# Patient Record
Sex: FEMALE | Race: WHITE | NOT HISPANIC OR LATINO | Employment: FULL TIME | URBAN - NONMETROPOLITAN AREA
[De-identification: names, ages, dates, MRNs, and addresses within clinical notes are randomized per-mention and may not be internally consistent; named-entity substitution may affect disease eponyms.]

---

## 2017-04-07 ENCOUNTER — HOSPITAL ENCOUNTER (OUTPATIENT)
Dept: RADIOLOGY | Facility: OTHER | Age: 52
End: 2017-04-07
Attending: FAMILY MEDICINE

## 2017-04-07 ENCOUNTER — HISTORY (OUTPATIENT)
Dept: RADIOLOGY | Facility: OTHER | Age: 52
End: 2017-04-07

## 2017-04-07 DIAGNOSIS — Z12.31 ENCOUNTER FOR SCREENING MAMMOGRAM FOR MALIGNANT NEOPLASM OF BREAST: ICD-10-CM

## 2017-04-11 ENCOUNTER — AMBULATORY - GICH (OUTPATIENT)
Dept: FAMILY MEDICINE | Facility: OTHER | Age: 52
End: 2017-04-11

## 2017-04-11 ENCOUNTER — OFFICE VISIT - GICH (OUTPATIENT)
Dept: FAMILY MEDICINE | Facility: OTHER | Age: 52
End: 2017-04-11

## 2017-04-11 ENCOUNTER — HISTORY (OUTPATIENT)
Dept: FAMILY MEDICINE | Facility: OTHER | Age: 52
End: 2017-04-11

## 2017-04-11 DIAGNOSIS — Z00.00 ENCOUNTER FOR GENERAL ADULT MEDICAL EXAMINATION WITHOUT ABNORMAL FINDINGS: ICD-10-CM

## 2017-04-11 DIAGNOSIS — Z12.4 ENCOUNTER FOR SCREENING FOR MALIGNANT NEOPLASM OF CERVIX: ICD-10-CM

## 2017-04-11 DIAGNOSIS — E78.49 OTHER HYPERLIPIDEMIA: ICD-10-CM

## 2017-04-11 DIAGNOSIS — D12.6 BENIGN NEOPLASM OF COLON: ICD-10-CM

## 2017-04-11 DIAGNOSIS — D64.9 ANEMIA: ICD-10-CM

## 2017-04-11 DIAGNOSIS — M23.203 DERANGEMENT OF MEDIAL MENISCUS OF RIGHT KNEE DUE TO OLD INJURY: ICD-10-CM

## 2017-04-14 ENCOUNTER — AMBULATORY - GICH (OUTPATIENT)
Dept: FAMILY MEDICINE | Facility: OTHER | Age: 52
End: 2017-04-14

## 2017-04-14 ENCOUNTER — AMBULATORY - GICH (OUTPATIENT)
Dept: LAB | Facility: OTHER | Age: 52
End: 2017-04-14

## 2017-04-14 DIAGNOSIS — D12.6 BENIGN NEOPLASM OF COLON: ICD-10-CM

## 2017-04-14 DIAGNOSIS — D64.9 ANEMIA: ICD-10-CM

## 2017-04-14 DIAGNOSIS — E78.49 OTHER HYPERLIPIDEMIA: ICD-10-CM

## 2017-04-14 DIAGNOSIS — Z00.00 ENCOUNTER FOR GENERAL ADULT MEDICAL EXAMINATION WITHOUT ABNORMAL FINDINGS: ICD-10-CM

## 2017-04-14 LAB
A/G RATIO - HISTORICAL: 1.3 (ref 1–2)
ABSOLUTE BASOPHILS - HISTORICAL: 0.1 THOU/CU MM
ABSOLUTE EOSINOPHILS - HISTORICAL: 0.3 THOU/CU MM
ABSOLUTE LYMPHOCYTES - HISTORICAL: 2 THOU/CU MM (ref 0.9–2.9)
ABSOLUTE MONOCYTES - HISTORICAL: 0.5 THOU/CU MM
ABSOLUTE NEUTROPHILS - HISTORICAL: 3.3 THOU/CU MM (ref 1.7–7)
ALBUMIN SERPL-MCNC: 3.9 G/DL (ref 3.5–5.7)
ALP SERPL-CCNC: 77 IU/L (ref 34–104)
ALT (SGPT) - HISTORICAL: 27 IU/L (ref 7–52)
ANION GAP - HISTORICAL: 8 (ref 5–18)
AST SERPL-CCNC: 20 IU/L (ref 13–39)
BASOPHILS # BLD AUTO: 1.5 %
BILIRUB SERPL-MCNC: 0.7 MG/DL (ref 0.3–1)
BILIRUB UR QL: NEGATIVE
BUN SERPL-MCNC: 13 MG/DL (ref 7–25)
BUN/CREAT RATIO - HISTORICAL: 17
CALCIUM SERPL-MCNC: 9.1 MG/DL (ref 8.6–10.3)
CHLORIDE SERPLBLD-SCNC: 105 MMOL/L (ref 98–107)
CHOL/HDL RATIO - HISTORICAL: 5.21
CHOLESTEROL TOTAL: 250 MG/DL
CLARITY, URINE: CLEAR CLARITY
CO2 SERPL-SCNC: 27 MMOL/L (ref 21–31)
COLOR UR: YELLOW COLOR
CREAT SERPL-MCNC: 0.76 MG/DL (ref 0.7–1.3)
EOSINOPHIL NFR BLD AUTO: 4.3 %
ERYTHROCYTE [DISTWIDTH] IN BLOOD BY AUTOMATED COUNT: 12.6 % (ref 11.5–15.5)
GFR IF NOT AFRICAN AMERICAN - HISTORICAL: >60 ML/MIN/1.73M2
GLOBULIN - HISTORICAL: 3.1 G/DL (ref 2–3.7)
GLUCOSE SERPL-MCNC: 90 MG/DL (ref 70–105)
GLUCOSE URINE: NEGATIVE MG/DL
HCT VFR BLD AUTO: 41.6 % (ref 33–51)
HDLC SERPL-MCNC: 48 MG/DL (ref 23–92)
HEMOGLOBIN: 13.7 G/DL (ref 12–16)
KETONES UR QL: NEGATIVE MG/DL
LDLC SERPL CALC-MCNC: 172 MG/DL
LEUKOCYTE ESTERASE URINE: NEGATIVE
LYMPHOCYTES NFR BLD AUTO: 32.2 % (ref 20–44)
MCH RBC QN AUTO: 29.3 PG (ref 26–34)
MCHC RBC AUTO-ENTMCNC: 32.8 G/DL (ref 32–36)
MCV RBC AUTO: 90 FL (ref 80–100)
MONOCYTES NFR BLD AUTO: 7.6 %
NEUTROPHILS NFR BLD AUTO: 54.5 % (ref 42–72)
NITRITE UR QL STRIP: NEGATIVE
NON-HDL CHOLESTEROL - HISTORICAL: 202 MG/DL
OCCULT BLOOD,URINE - HISTORICAL: NEGATIVE
PATIENT STATUS - HISTORICAL: ABNORMAL
PH UR: 7 [PH]
PLATELET # BLD AUTO: 277 THOU/CU MM (ref 140–440)
PMV BLD: 7.5 FL (ref 6.5–11)
POTASSIUM SERPL-SCNC: 4.3 MMOL/L (ref 3.5–5.1)
PROT SERPL-MCNC: 7 G/DL (ref 6.4–8.9)
PROTEIN QUALITATIVE,URINE - HISTORICAL: NEGATIVE MG/DL
RED BLOOD COUNT - HISTORICAL: 4.65 MIL/CU MM (ref 4–5.2)
SODIUM SERPL-SCNC: 140 MMOL/L (ref 133–143)
SP GR UR STRIP: 1.01
TRIGL SERPL-MCNC: 148 MG/DL
TSH - HISTORICAL: 2.67 UIU/ML (ref 0.34–5.6)
UROBILINOGEN,QUALITATIVE - HISTORICAL: NORMAL EU/DL
WHITE BLOOD COUNT - HISTORICAL: 6.1 THOU/CU MM (ref 4.5–11)

## 2017-09-18 ENCOUNTER — HISTORY (OUTPATIENT)
Dept: FAMILY MEDICINE | Facility: OTHER | Age: 52
End: 2017-09-18

## 2017-09-18 ENCOUNTER — OFFICE VISIT - GICH (OUTPATIENT)
Dept: FAMILY MEDICINE | Facility: OTHER | Age: 52
End: 2017-09-18

## 2017-09-18 DIAGNOSIS — J02.9 ACUTE PHARYNGITIS: ICD-10-CM

## 2017-09-18 LAB — STREP A ANTIGEN - HISTORICAL: NEGATIVE

## 2017-09-21 ENCOUNTER — OFFICE VISIT - GICH (OUTPATIENT)
Dept: FAMILY MEDICINE | Facility: OTHER | Age: 52
End: 2017-09-21

## 2017-09-21 ENCOUNTER — HISTORY (OUTPATIENT)
Dept: FAMILY MEDICINE | Facility: OTHER | Age: 52
End: 2017-09-21

## 2017-09-21 DIAGNOSIS — R06.83 SNORING: ICD-10-CM

## 2017-09-21 DIAGNOSIS — R40.0 SOMNOLENCE: ICD-10-CM

## 2017-09-21 DIAGNOSIS — F51.01 PRIMARY INSOMNIA: ICD-10-CM

## 2017-11-15 ENCOUNTER — HOSPITAL ENCOUNTER (OUTPATIENT)
Dept: SLEEP MEDICINE | Facility: OTHER | Age: 52
Setting detail: THERAPIES SERIES
End: 2017-11-15
Attending: FAMILY MEDICINE

## 2017-12-13 ENCOUNTER — HISTORY (OUTPATIENT)
Dept: FAMILY MEDICINE | Facility: OTHER | Age: 52
End: 2017-12-13

## 2017-12-13 ENCOUNTER — OFFICE VISIT - GICH (OUTPATIENT)
Dept: FAMILY MEDICINE | Facility: OTHER | Age: 52
End: 2017-12-13

## 2017-12-13 DIAGNOSIS — R20.2 PARESTHESIA OF SKIN: ICD-10-CM

## 2017-12-13 DIAGNOSIS — R20.0 ANESTHESIA OF SKIN: ICD-10-CM

## 2017-12-13 LAB
ABSOLUTE BASOPHILS - HISTORICAL: 0.1 THOU/CU MM
ABSOLUTE EOSINOPHILS - HISTORICAL: 0.3 THOU/CU MM
ABSOLUTE IMMATURE GRANULOCYTES(METAS,MYELOS,PROS) - HISTORICAL: 0 THOU/CU MM
ABSOLUTE LYMPHOCYTES - HISTORICAL: 2 THOU/CU MM (ref 0.9–2.9)
ABSOLUTE MONOCYTES - HISTORICAL: 0.7 THOU/CU MM
ABSOLUTE NEUTROPHILS - HISTORICAL: 4.6 THOU/CU MM (ref 1.7–7)
ANION GAP - HISTORICAL: 8 (ref 5–18)
BASOPHILS # BLD AUTO: 0.8 %
BUN SERPL-MCNC: 16 MG/DL (ref 7–25)
BUN/CREAT RATIO - HISTORICAL: 22
CALCIUM SERPL-MCNC: 8.7 MG/DL (ref 8.6–10.3)
CHLORIDE SERPLBLD-SCNC: 104 MMOL/L (ref 98–107)
CO2 SERPL-SCNC: 26 MMOL/L (ref 21–31)
CREAT SERPL-MCNC: 0.73 MG/DL (ref 0.7–1.3)
EOSINOPHIL NFR BLD AUTO: 3.3 %
ERYTHROCYTE [DISTWIDTH] IN BLOOD BY AUTOMATED COUNT: 12.9 % (ref 11.5–15.5)
GFR IF NOT AFRICAN AMERICAN - HISTORICAL: >60 ML/MIN/1.73M2
GLUCOSE SERPL-MCNC: 93 MG/DL (ref 70–105)
HCT VFR BLD AUTO: 39.8 % (ref 33–51)
HEMOGLOBIN: 13.3 G/DL (ref 12–16)
IMMATURE GRANULOCYTES(METAS,MYELOS,PROS) - HISTORICAL: 0.5 %
LYMPHOCYTES NFR BLD AUTO: 26.6 % (ref 20–44)
MCH RBC QN AUTO: 29.4 PG (ref 26–34)
MCHC RBC AUTO-ENTMCNC: 33.4 G/DL (ref 32–36)
MCV RBC AUTO: 88 FL (ref 80–100)
MONOCYTES NFR BLD AUTO: 9 %
NEUTROPHILS NFR BLD AUTO: 59.8 % (ref 42–72)
PLATELET # BLD AUTO: 256 THOU/CU MM (ref 140–440)
PMV BLD: 10.4 FL (ref 6.5–11)
POTASSIUM SERPL-SCNC: 3.6 MMOL/L (ref 3.5–5.1)
RED BLOOD COUNT - HISTORICAL: 4.52 MIL/CU MM (ref 4–5.2)
SODIUM SERPL-SCNC: 138 MMOL/L (ref 133–143)
TSH - HISTORICAL: 2.99 UIU/ML (ref 0.34–5.6)
VIT B12 SERPL-MCNC: 332 PG/ML (ref 180–914)
VITAMIN D TOTAL - HISTORICAL: 17.8 NG/ML
WHITE BLOOD COUNT - HISTORICAL: 7.6 THOU/CU MM (ref 4.5–11)

## 2017-12-14 ENCOUNTER — COMMUNICATION - GICH (OUTPATIENT)
Dept: FAMILY MEDICINE | Facility: OTHER | Age: 52
End: 2017-12-14

## 2017-12-14 DIAGNOSIS — E55.9 VITAMIN D DEFICIENCY: ICD-10-CM

## 2017-12-27 NOTE — PROGRESS NOTES
Patient Information     Patient Name MRN Sex Michelle Velasco [id number] Female 1965      Progress Notes by Radha Gamble NP at 2017 12:00 PM     Author:  Radha Gamble NP Service:  (none) Author Type:  PHYS- Nurse Practitioner     Filed:  2017  4:44 PM Encounter Date:  2017 Status:  Signed     :  Radha Gamble NP (PHYS- Nurse Practitioner)            Nursing Notes:   Glenda Costa  2017 12:24 PM  Signed  Patient presents to the clinic for sore throat that started yesterday. Patient reports fatigue that started yesterday also and having chills Saturday night. No temp was taken.    Glenda CARNEY CMA.......2017..12:01 PM    SUBJECTIVE:    Michelle Du is a 52 y.o. female who presents for Sore throat    Pharyngitis    This is a new problem. The current episode started yesterday. The problem has been unchanged. Neither side of throat is experiencing more pain than the other. There has been no fever. The pain is moderate. Pertinent negatives include no congestion, coughing, drooling, ear discharge, ear pain, headaches, hoarse voice, plugged ear sensation, neck pain, shortness of breath, stridor, swollen glands, trouble swallowing or vomiting. She has had no exposure to strep or mono. She has tried nothing for the symptoms. The treatment provided no relief.       Current Outpatient Prescriptions on File Prior to Visit       Medication  Sig Dispense Refill     omeprazole (PRILOSEC) 20 mg Delayed-Release capsule Take 2 capsules by mouth once daily if needed for GI Upset. 180 capsule 3     No current facility-administered medications on file prior to visit.        REVIEW OF SYSTEMS:  Review of Systems   HENT: Negative for congestion, drooling, ear discharge, ear pain, hoarse voice and trouble swallowing.    Respiratory: Negative for cough, shortness of breath and stridor.    Gastrointestinal: Negative for vomiting.   Musculoskeletal: Negative for neck pain.  "  Neurological: Negative for headaches.       OBJECTIVE:  /70  Pulse 90  Temp 98.1  F (36.7  C) (Tympanic)  Ht 1.575 m (5' 2\")  Wt 118.4 kg (261 lb 2 oz)  BMI 47.76 kg/m2    EXAM:   Physical Exam   Constitutional: She is well-developed, well-nourished, and in no distress.   HENT:   Head: Normocephalic and atraumatic.   Right Ear: Tympanic membrane and ear canal normal.   Left Ear: Tympanic membrane and ear canal normal.   Nose: Nose normal.   Mouth/Throat: Uvula is midline and mucous membranes are normal. Posterior oropharyngeal erythema present.   Eyes: Conjunctivae are normal.   Neck: Neck supple.   Cardiovascular: Normal rate, regular rhythm and normal heart sounds.    Pulmonary/Chest: Effort normal and breath sounds normal. No respiratory distress. She has no wheezes. She has no rales.   Lymphadenopathy:     She has no cervical adenopathy.   Neurological: She is alert.   Skin: Skin is warm and dry. No rash noted.   Nursing note and vitals reviewed.      Results for orders placed or performed in visit on 09/18/17      RAPID STREP WITH REFLEX CULTURE      Result  Value Ref Range    STREP A ANTIGEN           Negative Negative       ASSESSMENT/PLAN:    ICD-10-CM    1. Sore throat J02.9 RAPID STREP WITH REFLEX CULTURE      RAPID STREP WITH REFLEX CULTURE        Plan:  Completed labs at today's visit RST.  I personally reviewed the labs with the patient/parent at the visit. Abnormalities include None. Home cares and OTC gone over. Labs discussed. Rest, fluids, and over-the-counter symptomatic treatments were recommended. The lack of efficacy of antibiotics in viral illnesses was discussed. The patient will return to the clinic or call if the symptoms worsen, new problems develop, or if all symptoms are not significantly improved in 72 hours, and completely resolved within one week. Otherwise the patient will call with other questions, concerns, or problems.        SAMY ROMANO NP ....................  " 9/18/2017   4:43 PM

## 2017-12-28 NOTE — PATIENT INSTRUCTIONS
Patient Information     Patient Name MRN Michelle Burks [id number] Female 1965      Patient Instructions by Radha Gamble NP at 2017 12:00 PM     Author:  Radha Gamble NP Service:  (none) Author Type:  PHYS- Nurse Practitioner     Filed:  2017 12:41 PM Encounter Date:  2017 Status:  Signed     :  Radha Gamble NP (PHYS- Nurse Practitioner)            Cold Medicines   What are cold medicines?  Symptoms of the common cold start gradually over several days and usually last about two weeks. Symptoms may include sneezing, a stuffy or runny nose, sore throat, cough, watery eyes, mild headache, or body aches. A cold will go away on its own without treatment. However, there are many nonprescription products that may help relieve some of the symptoms of a cold. Cold medicines often contain more than one ingredient and are used to treat more than one symptom. Read the labels and buy products that have only the ingredients that you need. If you are not sure which medicine is best, ask your pharmacist.  How do they work?  Decongestants reduce swelling in your nose and sinuses. They may also lessen the amount of mucus made by your nose. If you use decongestants more often than directed, your stuffy nose may get worse.   Antihistamines block the effect of histamine. Histamine is a chemical your body makes when you have an allergic reaction. Antihistamines are most often used to treat itchy or watery eyes or a stuffy or runny nose caused by an allergy. Antihistamines may not help a stuffy or runny nose caused by a cold because they can make mucus thick and dry.  Mucolytics are medicines that make mucus thinner so that it is easier to cough up out of your throat and lungs.  Expectorants are cough medicines that may help to keep the mucus thin and bring up mucus from the lungs when you cough. This may relieve chest congestion and make it easier to breathe.   Cough suppressants  (antitussives) are medicines that lessen the urge to cough. They may give relief from a dry, hacking cough. If you have a cough that is wet sounding and produces mucus, it is important for you to cough the mucus up out of your lungs. For this reason, cough suppressants are not recommended for a wet sounding cough.  Fever and pain relievers, such as acetaminophen, aspirin, or other nonsteroidal anti-inflammatory drugs (NSAIDs), are often included in cold medicine. Read labels carefully to avoid taking more medicine than you need.  What else do I need to know about this medicine?    Talk to your healthcare provider if your symptoms start suddenly or you have severe symptoms. This may mean you have something more serious than a cold.    Follow the directions that come with your medicine, including information about food or alcohol. Make sure you know how and when to take your medicine. Do not take more or less than you are supposed to take.    Try to get all of your medicine at the same place. Your pharmacist can help make sure that all of your medicines are safe to take together.    Keep a list of your medicines with you. List all of the prescription medicines, nonprescription medicines, supplements, natural remedies, and vitamins that you take. Tell all healthcare providers who treat you about all of the products you are taking.    Many medicines have side effects. A side effect is a symptom or problem that is caused by the medicine. Ask your healthcare provider or pharmacist what side effects the medicine may cause and what you should do if you have side effects.    Nonsteroidal anti-inflammatory medicines (NSAIDs), such as ibuprofen, naproxen, and aspirin, may cause stomach bleeding and other problems. These risks increase with age. Read the label and take as directed. Unless recommended by your healthcare provider, do not take for more than 10 days for any reason.    Acetaminophen may cause liver damage or other  problems. Unless recommended by your provider, don't take more than 3000 milligrams (mg) in 24 hours. To make sure you don t take too much, check other medicines you take to see if they also contain acetaminophen. Ask your provider if you need to avoid drinking alcohol while taking this medicine.  If you have any questions, ask your healthcare provider or pharmacist for more information. Be sure to keep all appointments for provider visits or tests.

## 2017-12-28 NOTE — PROGRESS NOTES
Patient Information     Patient Name MRN Sex Michelle Velasco [id number] Female 1965      Progress Notes by Tad Ortiz MD at 2017  1:00 PM     Author:  Tad Ortiz MD Service:  (none) Author Type:  Physician     Filed:  2017  8:56 AM Encounter Date:  2017 Status:  Signed     :  Tad Ortiz MD (Physician)            SUBJECTIVE:  52 y.o. female who presents for evaluation of progressively severe daytime somnolence. She notes it is been coming on for quite some time but she's gotten more and more sleepy during the day to the point where she sometimes falls asleep when she takes a break at work. She states she does not sleep well at night. Her family has noted that she snores and apparently has been told that she will sometimes have positive breathing.    She had a physical earlier this year and had lab work all of which was normal. She states that she wakes up multiple times during the night.    Additional Review of Systems: see HPI:   No chest pain or shortness of breath and no neurologic symptoms.    Past Medical History:     Diagnosis  Date     Diverticulosis of colon, extensive 3/29/2013     Gastritis 3/29/2013     GERD      H/O adenomatous polyp of colon 3/29/2013     INCONTINENCE, URGE 2011     INSOMNIA, CHRONIC      Tubular adenoma of colon 2014-repeat colonoscopy in 2018         Current Outpatient Prescriptions       Medication  Sig Dispense Refill     omeprazole (PRILOSEC) 20 mg Delayed-Release capsule Take 2 capsules by mouth once daily if needed for GI Upset. 180 capsule 3     traZODone (DESYREL) 100 mg tablet Take 0.5-1 tablets by mouth at bedtime. 30 tablet 5     No current facility-administered medications for this visit.      Medications have been reviewed by me and are current to the best of my knowledge and ability.      Allergies as of 2017 - Nic as Reviewed 2017      Allergen  Reaction Noted      "Penicillins Rash 03/08/2013     Sulfa (sulfonamide antibiotics) Rash 03/08/2013        OBJECTIVE:  /78  Pulse 76  Ht 1.562 m (5' 1.5\")  Wt 117.6 kg (259 lb 3.2 oz)  LMP 11/25/2016  BMI 48.18 kg/m2  EXAM:  General Appearance: Pleasant, alert, appropriate appearance for age. No acute distress. Obese, with a BMI of 48.  OroPharynx Exam: Normal. Nose is normal.  However there does appear to be a fairly short distance between the uvula and the posterior pharyngeal wall.  Thyroid Exam: No nodules or enlargement.  Chest/Respiratory Exam: Normal chest wall and respirations. Clear to auscultation.  Cardiovascular Exam: Regular rate and rhythm. S1, S2, no murmur, click, gallop, or rubs.  No peripheral edema.  Neurologic Exam: Intact and nonfocal  Psychiatric Exam: Alert and oriented, appropriate affect.  Laboratory studies done earlier this summer were reviewed.    ASSESSMENT/PLAN:  Daytime somnolence and snoring-symptoms are quite consistent with sleep apnea. She also has insomnia which is part of the issue. A sleep study will be scheduled. In the meantime I suggested she try trazodone to see if this will help with her sleep. She will follow up again pending the results of her sleep study.  Tad Ortiz MD ....................  9/22/2017   8:56 AM            "

## 2017-12-30 NOTE — NURSING NOTE
Patient Information     Patient Name MRN Sex Michelle Velasco [id number] Female 1965      Nursing Note by Rea Palmer at 2017  1:00 PM     Author:  eRa Palmer Service:  (none) Author Type:  (none)     Filed:  2017  1:27 PM Encounter Date:  2017 Status:  Signed     :  Rea Palmer            Patient presents to clinic with fatigue. She is not sleeping well at night. This has been going on for a couple years. She is now starting to fall asleep at work during breaks.  Rea PalmerLPN ....................  2017   1:08 PM

## 2017-12-30 NOTE — NURSING NOTE
Patient Information     Patient Name MRN Sex Michelle Velasco [id number] Female 1965      Nursing Note by Glenda Costa at 2017 12:00 PM     Author:  Glenda Costa Service:  (none) Author Type:  (none)     Filed:  2017 12:24 PM Encounter Date:  2017 Status:  Signed     :  Glenda Costa            Patient presents to the clinic for sore throat that started yesterday. Patient reports fatigue that started yesterday also and having chills day night. No temp was taken.    Glenda CARNEY, FREDERICK.......2017..12:01 PM

## 2018-01-04 NOTE — NURSING NOTE
Patient Information     Patient Name MRN Sex Michelle Velasco [id number] Female 1965      Nursing Note by Leigh Connors at 2017  2:45 PM     Author:  Leigh Connors Service:  (none) Author Type:  (none)     Filed:  2017  3:05 PM Encounter Date:  2017 Status:  Signed     :  Leigh Connors            Patient presents to the clinic today for a px.    Leigh Connors ....................  2017   3:05 PM

## 2018-01-04 NOTE — PROGRESS NOTES
Patient Information     Patient Name MRN Sex     Michelle Galvan [id number] Female 1965      Progress Notes signed by Tarah Gallagher MD at 2017 11:41 AM      Author:  Traah Gallagher MD Service:  (none) Author Type:  Physician     Filed:  2017 11:41 AM Encounter Date:  2017 Status:  Signed     :  Tarah Gallagher MD (Physician)            -  2017          RE:  MICHELLE GALVAN  MR#:  [id number]  :  1965  LOCATION:    ROOM:      Dear  Michelle:    I released all your lab work to [facility] so you can take a look at it. The tests all looked entirely normal with the exception of the cholesterol, but it has come down from 259 when it was checked a couple of years ago. If you work on diet and exercise and lose some weight, this will likely continue to come down. Your blood profile was totally normal, with no evidence of diabetes or any problems with the liver or kidneys. Overall, these tests look just fine. I would suggest repeating labs again in a year.     Sincerely yours,         TARAH GALLAGHER MD    WR/cd  Voice Job ID: [id number]  Text Job ID:  [id number]    cc:  2017  RE:  MICHELLE GALVAN  MR#:  [id number]  Page 1

## 2018-01-04 NOTE — H&P
Patient Information     Patient Name MRN Michelle Burks [id number] Female 1965      H&P by Tad Ortiz MD at 2017  2:45 PM     Author:  Tad Ortiz MD Service:  (none) Author Type:  Physician     Filed:  2017  4:02 PM Encounter Date:  2017 Status:  Signed     :  Tad Ortiz MD (Physician)            HPI-Comes in today for comprehensive evaluation.  She's been feeling relatively well. She has been having some hot flashes, however, but they're not real severe. Her last visit. Was 2016. She's had no GYN symptoms or other problems.    She has a history of anemia and had a significant workup done in . At that time she was found to have gastritis on EGD, and significant diverticulosis along with a colon polyp which was a tubular adenoma. She is due for a colonoscopy again next year. She has noted that she felt a bit tired and rundown, and she also had 2 episodes of minimal blood in the stool last month. She's had no more since that she had none before. This happened in 2 consecutive days.    She is otherwise felt well. Her only medication is occasional omeprazole.    ROS:  See HPI.  In general she is gradually gaining some weight but no fever, chills, or night sweats. Eyes and ENT are negative. Cardiopulmonary, GI, , rheumatologic, hematologic, skin, lymph, neurologic, psychiatric and endocrine are all negative.    Past Medical History:     Diagnosis  Date     Diverticulosis of colon, extensive 3/29/2013     Gastritis 3/29/2013     GERD      H/O adenomatous polyp of colon 3/29/2013     INCONTINENCE, URGE 2011     INSOMNIA, CHRONIC      Tubular adenoma of colon 2014-repeat colonoscopy in 2018      Past Surgical History:      Procedure  Laterality Date      SECTION      x 2       COLONOSCOPY DIAGNOSTIC  3/29/13    Colonoscopy F/U 2018       ESOPHAGOGASTRODUODENOSCOPY  3/29 13    EGD       TUBAL LIGATION        Family History       Problem   Relation Age of Onset     Diabetes  Father      Hypertension  Father      Other  Father      metastatic melanoma       Heart Disease  Mother      acute MI       Cancer-breast  No Family History      Social History     Substance Use Topics       Smoking status: Never Smoker     Smokeless tobacco: Never Used     Alcohol use No     Allergies     Allergen  Reactions     Penicillins Rash     Sulfa (Sulfonamide Antibiotics) Rash       Current Outpatient Prescriptions:      omeprazole (PRILOSEC) 20 mg Delayed-Release capsule, Take 2 capsules by mouth once daily if needed for GI Upset., Disp: 180 capsule, Rfl: 3  Medications have been reviewed by me and are current to the best of my knowledge and ability.      EXAM:she is a pleasant healthy-appearing 51 y.o. female in no apparent distress.  She answers questions appropriately and appears well-kempt.  HEENT exam is within normal limits.  .  Neck is supple with good carotid pulses.  No bruits are heard.  No thyromegaly or adenopathy.  Lungs are clear with good air movement.  No rales, rhonchi, or wheezes are heard--  Breasts are normal in appearance, palpably free of masses.    There is no skin dimpling or nipple changes and axilla are negative.  Cardiac exam reveals a regular rhythm with no murmur or gallop appreciated.  Peripheral pulses are equal and strong  Abdomen-quite obese, but soft and nontender with no hepatosplenomegaly or masses.  Bowel sounds are normal and no bruits are heard  Back is straight with no scoliosis or CVA tenderness.     exam reveals normal external genitalia.  Vagina is normal. Cervix is normal in appearance. Pap smear was done..  Uterus is difficult to palpate because of her size..  Both adnexa are normal.     Extremities no edema. Good distal pulses.  Neurologic exam is intact and nonfocal  Skin is free of significant lesions.  No rashes are seen  Lymph nodes are nonpalpable in cervical, axillary, and inguinal  areas.  Psychiatric: Alert and oriented with normal mood and affect    Lab-pending  Imaging-mammogram done last week was normal. Report was reviewed with her.    Assessment-preventive healthcare-normal exam. Mammogram was done and was normal. Colonoscopy is due next year. Pap smear was done today.    History of tubular adenoma associated with anemia and rectal bleeding. If she is anemic again this year I suggested we go ahead with colonoscopy this year. We will await results of her lab work. She will come back fasting for that.    History of medial meniscal tear. She still does have some knee pain but she said it's very tolerable. She will see orthopedics if symptoms worsen.        Plan-she'll be notified of her Pap result. Reviewed her mammogram report with her. She will return fasting for lab and I'll notify her of the results. If hemoglobin is low proceed with colonoscopy. If so, clearance is given to proceed based on today's exam.    Tad Ortiz MD ....................  4/11/2017   3:57 PM

## 2018-01-04 NOTE — PROGRESS NOTES
Patient Information     Patient Name MRN Michelle Burks [id number] Female 1965      Progress Notes by Tad Ortiz MD at 2017  2:45 PM     Author:  Tad Ortiz MD Service:  (none) Author Type:  Physician     Filed:  2017  4:02 PM Encounter Date:  2017 Status:  Signed     :  Tad Ortiz MD (Physician)            HPI-Comes in today for comprehensive evaluation.  She's been feeling relatively well. She has been having some hot flashes, however, but they're not real severe. Her last visit. Was 2016. She's had no GYN symptoms or other problems.    She has a history of anemia and had a significant workup done in . At that time she was found to have gastritis on EGD, and significant diverticulosis along with a colon polyp which was a tubular adenoma. She is due for a colonoscopy again next year. She has noted that she felt a bit tired and rundown, and she also had 2 episodes of minimal blood in the stool last month. She's had no more since that she had none before. This happened in 2 consecutive days.    She is otherwise felt well. Her only medication is occasional omeprazole.    ROS:  See HPI.  In general she is gradually gaining some weight but no fever, chills, or night sweats. Eyes and ENT are negative. Cardiopulmonary, GI, , rheumatologic, hematologic, skin, lymph, neurologic, psychiatric and endocrine are all negative.    Past Medical History:     Diagnosis  Date     Diverticulosis of colon, extensive 3/29/2013     Gastritis 3/29/2013     GERD      H/O adenomatous polyp of colon 3/29/2013     INCONTINENCE, URGE 2011     INSOMNIA, CHRONIC      Tubular adenoma of colon 2014-repeat colonoscopy in 2018      Past Surgical History:      Procedure  Laterality Date      SECTION      x 2       COLONOSCOPY DIAGNOSTIC  3/29/13    Colonoscopy F/U 2018       ESOPHAGOGASTRODUODENOSCOPY  3/29 13    EGD       TUBAL LIGATION        Family History       Problem   Relation Age of Onset     Diabetes  Father      Hypertension  Father      Other  Father      metastatic melanoma       Heart Disease  Mother      acute MI       Cancer-breast  No Family History      Social History     Substance Use Topics       Smoking status: Never Smoker     Smokeless tobacco: Never Used     Alcohol use No     Allergies     Allergen  Reactions     Penicillins Rash     Sulfa (Sulfonamide Antibiotics) Rash       Current Outpatient Prescriptions:      omeprazole (PRILOSEC) 20 mg Delayed-Release capsule, Take 2 capsules by mouth once daily if needed for GI Upset., Disp: 180 capsule, Rfl: 3  Medications have been reviewed by me and are current to the best of my knowledge and ability.      EXAM:she is a pleasant healthy-appearing 51 y.o. female in no apparent distress.  She answers questions appropriately and appears well-kempt.  HEENT exam is within normal limits.  .  Neck is supple with good carotid pulses.  No bruits are heard.  No thyromegaly or adenopathy.  Lungs are clear with good air movement.  No rales, rhonchi, or wheezes are heard--  Breasts are normal in appearance, palpably free of masses.    There is no skin dimpling or nipple changes and axilla are negative.  Cardiac exam reveals a regular rhythm with no murmur or gallop appreciated.  Peripheral pulses are equal and strong  Abdomen-quite obese, but soft and nontender with no hepatosplenomegaly or masses.  Bowel sounds are normal and no bruits are heard  Back is straight with no scoliosis or CVA tenderness.     exam reveals normal external genitalia.  Vagina is normal. Cervix is normal in appearance. Pap smear was done..  Uterus is difficult to palpate because of her size..  Both adnexa are normal.     Extremities no edema. Good distal pulses.  Neurologic exam is intact and nonfocal  Skin is free of significant lesions.  No rashes are seen  Lymph nodes are nonpalpable in cervical, axillary, and  inguinal areas.  Psychiatric: Alert and oriented with normal mood and affect    Lab-pending  Imaging-mammogram done last week was normal. Report was reviewed with her.    Assessment-preventive healthcare-normal exam. Mammogram was done and was normal. Colonoscopy is due next year. Pap smear was done today.    History of tubular adenoma associated with anemia and rectal bleeding. If she is anemic again this year I suggested we go ahead with colonoscopy this year. We will await results of her lab work. She will come back fasting for that.    History of medial meniscal tear. She still does have some knee pain but she said it's very tolerable. She will see orthopedics if symptoms worsen.        Plan-she'll be notified of her Pap result. Reviewed her mammogram report with her. She will return fasting for lab and I'll notify her of the results. If hemoglobin is low proceed with colonoscopy. If so, clearance is given to proceed based on today's exam.    Tad Ortiz MD ....................  4/11/2017   3:57 PM

## 2018-01-04 NOTE — PROGRESS NOTES
Patient Information     Patient Name MRN Sex Michelle Velasco [id number] Female 1965      Progress Notes by Roxana Mendes at 2017 10:21 AM     Author:  Roxana Mendes Service:  (none) Author Type:  (none)     Filed:  2017 10:21 AM Date of Service:  2017 10:21 AM Status:  Signed     :  Roxana Mendes            Falls Risk Criteria:    Age 65 and older or under age 4        Sensory deficits    Poor vision    Use of ambulatory aides    Impaired judgment    Unable to walk independently    Meets High Risk criteria for falls:  no

## 2018-01-11 ENCOUNTER — HOSPITAL ENCOUNTER (OUTPATIENT)
Dept: RADIOLOGY | Facility: OTHER | Age: 53
End: 2018-01-11
Attending: PHYSICIAN ASSISTANT

## 2018-01-11 ENCOUNTER — HISTORY (OUTPATIENT)
Dept: FAMILY MEDICINE | Facility: OTHER | Age: 53
End: 2018-01-11

## 2018-01-11 ENCOUNTER — OFFICE VISIT - GICH (OUTPATIENT)
Dept: FAMILY MEDICINE | Facility: OTHER | Age: 53
End: 2018-01-11

## 2018-01-11 DIAGNOSIS — M25.562 PAIN IN LEFT KNEE: ICD-10-CM

## 2018-01-11 DIAGNOSIS — E55.9 VITAMIN D DEFICIENCY: ICD-10-CM

## 2018-01-15 ENCOUNTER — OFFICE VISIT - GICH (OUTPATIENT)
Dept: SCHEDULING | Facility: OTHER | Age: 53
End: 2018-01-15

## 2018-01-19 ENCOUNTER — COMMUNICATION - GICH (OUTPATIENT)
Dept: SURGERY | Facility: OTHER | Age: 53
End: 2018-01-19

## 2018-01-25 VITALS
WEIGHT: 259.2 LBS | DIASTOLIC BLOOD PRESSURE: 78 MMHG | BODY MASS INDEX: 47.7 KG/M2 | HEART RATE: 76 BPM | SYSTOLIC BLOOD PRESSURE: 130 MMHG | HEIGHT: 62 IN

## 2018-01-25 VITALS
BODY MASS INDEX: 48.05 KG/M2 | WEIGHT: 261.13 LBS | HEART RATE: 90 BPM | DIASTOLIC BLOOD PRESSURE: 70 MMHG | HEIGHT: 62 IN | SYSTOLIC BLOOD PRESSURE: 110 MMHG | TEMPERATURE: 98.1 F

## 2018-01-25 VITALS
SYSTOLIC BLOOD PRESSURE: 124 MMHG | HEART RATE: 84 BPM | DIASTOLIC BLOOD PRESSURE: 80 MMHG | HEIGHT: 62 IN | WEIGHT: 254 LBS | BODY MASS INDEX: 46.74 KG/M2

## 2018-02-02 ENCOUNTER — DOCUMENTATION ONLY (OUTPATIENT)
Dept: FAMILY MEDICINE | Facility: OTHER | Age: 53
End: 2018-02-02

## 2018-02-02 PROBLEM — E55.9 HYPOVITAMINOSIS D: Status: ACTIVE | Noted: 2017-12-14

## 2018-02-02 PROBLEM — E66.9 OBESITY: Status: ACTIVE | Noted: 2018-02-02

## 2018-02-02 PROBLEM — M23.203 OLD TEAR OF MEDIAL MENISCUS OF RIGHT KNEE: Status: ACTIVE | Noted: 2017-04-11

## 2018-02-02 RX ORDER — ERGOCALCIFEROL 1.25 MG/1
50000 CAPSULE, LIQUID FILLED ORAL
COMMUNITY
Start: 2017-12-14 | End: 2018-02-02

## 2018-02-06 ENCOUNTER — TRANSFERRED RECORDS (OUTPATIENT)
Dept: HEALTH INFORMATION MANAGEMENT | Facility: OTHER | Age: 53
End: 2018-02-06

## 2018-02-09 VITALS
HEART RATE: 92 BPM | TEMPERATURE: 97.5 F | SYSTOLIC BLOOD PRESSURE: 136 MMHG | WEIGHT: 261.4 LBS | DIASTOLIC BLOOD PRESSURE: 74 MMHG | BODY MASS INDEX: 48.59 KG/M2

## 2018-02-09 VITALS
BODY MASS INDEX: 48.66 KG/M2 | SYSTOLIC BLOOD PRESSURE: 126 MMHG | WEIGHT: 264.4 LBS | DIASTOLIC BLOOD PRESSURE: 88 MMHG | HEIGHT: 62 IN | HEART RATE: 76 BPM

## 2018-02-09 VITALS
WEIGHT: 264.4 LBS | SYSTOLIC BLOOD PRESSURE: 128 MMHG | HEART RATE: 76 BPM | BODY MASS INDEX: 49.15 KG/M2 | DIASTOLIC BLOOD PRESSURE: 74 MMHG

## 2018-02-12 NOTE — NURSING NOTE
Patient Information     Patient Name MRN Sex Michelle Velasco [id number] Female 1965      Nursing Note by Coty Guzman at 2018  9:15 AM     Author:  Coty Guzman Service:  (none) Author Type:  (none)     Filed:  2018  9:24 AM Encounter Date:  2018 Status:  Signed     :  Coty Guzman            Patient presents to the clinic for left knee pain.  Patient states that she was walking on Tuesday and got a sharp pain in the knee.   Coty Guzman LPN........................2018  9:16 AM

## 2018-02-12 NOTE — TELEPHONE ENCOUNTER
Patient Information     Patient Name MRN Sex Michelle Velasco [id number] Female 1965      Telephone Encounter by Coty Guzman at 2017  1:56 PM     Author:  Coty Guzman Service:  (none) Author Type:  (none)     Filed:  2017  1:56 PM Encounter Date:  2017 Status:  Signed     :  Coty Guzman            Patient notified of results and recommendations.  Coty Guzman LPN ....................  2017   1:56 PM

## 2018-02-12 NOTE — TELEPHONE ENCOUNTER
Patient Information     Patient Name MRN Sex Michelle Velasco [id number] Female 1965      Telephone Encounter by Taryn Luo at 2017 12:06 PM     Author:  Taryn Luo Service:  (none) Author Type:  (none)     Filed:  2017 12:06 PM Encounter Date:  2017 Status:  Signed     :  Taryn Luo            Left message to call back.  Taryn Luo LPN ....................  2017   12:06 PM

## 2018-02-12 NOTE — TELEPHONE ENCOUNTER
Patient Information     Patient Name MRN Sex Michelle Velasco [id number] Female 1965      Telephone Encounter by Cristal Naranjo PA-C at 2017 11:14 AM     Author:  Cristal Naranjo PA-C Service:  (none) Author Type:  PHYS- Physician Assistant     Filed:  2017 11:15 AM Encounter Date:  2017 Status:  Signed     :  Cristal Naranjo PA-C (PHYS- Physician Assistant)            Your vitamin D level came back low.  I sent over a prescription for vitamin D to the pharmacy.  Please take once weekly for 8 weeks. After completion please take 1500 mg calcium and 1500 units of vitamin D daily to keep your supplies up. Please have your vitamin D level rechecked in 3-6 months.     Your other labs are normal.   Cristal Naranjo PA-C ....................  2017   11:15 AM

## 2018-02-12 NOTE — PROGRESS NOTES
Patient Information     Patient Name MRN Sex Michelle Velasco [id number] Female 1965      Progress Notes by Cristal Naranjo PA-C at 2017  1:45 PM     Author:  Cristal Naranjo PA-C Service:  (none) Author Type:  PHYS- Physician Assistant     Filed:  2017  2:12 PM Encounter Date:  2017 Status:  Signed     :  Cristal Naranjo PA-C (PHYS- Physician Assistant)            Nursing Notes:   Coty Guzman  2017  1:53 PM  Signed  Patient presents to the clinic for numbness and tingling in bilateral hands.  Patient states that this has been going on for about a month on and off.  She states that she saw chiropractor yesterday and they recommended having her thyroid and vitamin d rechecked.  Coty Guzman LPN........................2017  1:46 PM      HPI:    Michelle Du is a 52 y.o. female who presents for numbness and tingling in bilateral hands.  Patient states that this has been going on for about a month on and off.  She states that she saw chiropractor yesterday and they recommended having her thyroid and vitamin d rechecked. Constant tingling last 2 days.  Went to chiropractor which helped.  Now off and on.  No neck pain. History of anemia in the past in  however this has been well-controlled over the last few years. Does not take iron. No hand trauma. No neck pain, shoulder pain or arm pain. No swelling or bruising appreciated. She states that she does lift a lot of boxes during the daytime. No history of carpal tunnel in the past.      Past Medical History:     Diagnosis  Date     Diverticulosis of colon, extensive 3/29/2013     Gastritis 3/29/2013     GERD      H/O adenomatous polyp of colon 3/29/2013     INCONTINENCE, URGE 2011     INSOMNIA, CHRONIC      Tubular adenoma of colon 2014-repeat colonoscopy in 2018        Past Surgical History:      Procedure  Laterality Date      SECTION      x 2       COLONOSCOPY DIAGNOSTIC   3/29/13    Colonoscopy F/U 2018       ESOPHAGOGASTRODUODENOSCOPY  3/29 13    EGD       TUBAL LIGATION         Social History     Substance Use Topics       Smoking status: Never Smoker     Smokeless tobacco: Never Used     Alcohol use No       Current Outpatient Prescriptions       Medication  Sig Dispense Refill     omeprazole (PRILOSEC) 20 mg Delayed-Release capsule Take 2 capsules by mouth once daily if needed for GI Upset. 180 capsule 3     No current facility-administered medications for this visit.      Medications have been reviewed by me and are current to the best of my knowledge and ability.      Allergies     Allergen  Reactions     Penicillins Rash     Sulfa (Sulfonamide Antibiotics) Rash       REVIEW OF SYSTEMS:  Refer to HPI.    EXAM:   Vitals:    /74 (Cuff Site: Right Arm, Position: Sitting, Cuff Size: Adult Regular)  Pulse 92  Temp 97.5  F (36.4  C) (Tympanic)  Wt 118.6 kg (261 lb 6.4 oz)  LMP 11/25/2016  Breastfeeding? No  BMI 48.59 kg/m2    General Appearance: Pleasant, alert, appropriate appearance for age. No acute distress  Chest/Respiratory Exam: Normal chest wall and respirations. Clear to auscultation.  Cardiovascular Exam: Regular rate and rhythm. S1, S2, no murmur, click, gallop, or rubs.  Musculoskeletal Exam: Back is straight and non-tender, full ROM of upper extremities. No swelling or bruising of the hands or arms. No spinal or cervical spinal muscle pain with palpation. No shoulder or arm pain with palpation. Full range of motion in hands bilaterally without discomfort. Negative Tinnel test bilaterally.  Skin: no rash or abnormalities  Neurologic Exam: Nonfocal, normal gross motor, tone coordination and no tremor.  Psychiatric Exam: Alert and oriented - appropriate affect.    PHQ Depression Screen  Date of PHQ exam: 12/13/17  Over the last 2 weeks, how often have you been bothered by any of the following problems?  1. Little interest or pleasure in doing things: 0 - Not at  all  2. Feeling down, depressed, or hopeless: 0 - Not at all         ASSESSMENT AND PLAN:      ICD-10-CM    1. Numbness and tingling in both hands R20.0 EMG     R20.2 CBC WITH DIFFERENTIAL      BASIC METABOLIC PANEL      TSH      VITAMIN B12      VITAMIN D 25 (DEFICIENCY)       Completed labs to rule out concerns including CBC, BMP, TSH, vitamin B12, vitamin D. Results pending.  Ordered EMG to rule out concerns of the upper extremities.    Can monitor symptoms over the next few weeks. Use ibuprofen as needed. Can also try warm compresses. Encouraged to stretch hands and arms frequently especially with repetitive lifting of boxes.    Cristal Naranjo PA-C..................12/13/2017 1:51 PM

## 2018-02-12 NOTE — PROGRESS NOTES
Patient Information     Patient Name MRN Sex Michelle Neumann [id number] Female 1965      Progress Notes by Cristal Naranjo PA-C at 2018  9:15 AM     Author:  Cristal Naranjo PA-C Service:  (none) Author Type:  PHYS- Physician Assistant     Filed:  2018 11:26 AM Encounter Date:  2018 Status:  Signed     :  Cristal Naranjo PA-C (PHYS- Physician Assistant)            Nursing Notes:   Coty Guzman  2018  9:24 AM  Signed  Patient presents to the clinic for left knee pain.  Patient states that she was walking on Tuesday and got a sharp pain in the knee.   Coty Guzamn LPN........................2018  9:16 AM      HPI:    Michelle Du is a 52 y.o. female who presents for left knee pain.  Patient states that she was walking on  and got a sharp pain in the knee.  Hx mild arthritis in knees.  Sharp pain in knee 2 days ago.  Hard to walk at first . Happened twice.  Wore a brace yesterday.  Still hurts.  Not going away. No swelling, bruising.  No injury. Pain is on the left medial knee. No known injury. No calf tenderness. No edema. No chest pain or palpitations or problems breathing.    Past Medical History:     Diagnosis  Date     Diverticulosis of colon, extensive 3/29/2013     Gastritis 3/29/2013     GERD      H/O adenomatous polyp of colon 3/29/2013     INCONTINENCE, URGE 2011     INSOMNIA, CHRONIC      Tubular adenoma of colon 2014-repeat colonoscopy in 2018        Past Surgical History:      Procedure  Laterality Date      SECTION      x 2       COLONOSCOPY DIAGNOSTIC  3/29/13    Colonoscopy F/U 2018       ESOPHAGOGASTRODUODENOSCOPY  3/29 13    EGD       TUBAL LIGATION         Social History     Substance Use Topics       Smoking status: Never Smoker     Smokeless tobacco: Never Used     Alcohol use No       Current Outpatient Prescriptions       Medication  Sig Dispense Refill     ergocalciferol (VITAMIN D2; DRISDOL) 50,000  unit capsule Take 1 capsule by mouth once weekly for 8 doses. 8 capsule 0     omeprazole (PRILOSEC) 20 mg Delayed-Release capsule Take 2 capsules by mouth once daily if needed for GI Upset. 180 capsule 3     No current facility-administered medications for this visit.      Medications have been reviewed by me and are current to the best of my knowledge and ability.      Allergies     Allergen  Reactions     Penicillins Rash     Sulfa (Sulfonamide Antibiotics) Rash       REVIEW OF SYSTEMS:  Refer to HPI.    EXAM:   Vitals:    /74 (Cuff Site: Right Arm, Position: Sitting, Cuff Size: Adult Regular)  Pulse 76  Wt 119.9 kg (264 lb 6.4 oz)  LMP 11/29/2016 (Approximate)  Breastfeeding? No  BMI 49.15 kg/m2    General Appearance: Pleasant, alert, appropriate appearance for age. No acute distress  Musculoskeletal Exam: No lower extremity pain with palpation. No knee pain bilaterally with palpation. Patient has left medial knee pain with extreme flexion and internal rotation. Otherwise unremarkable range of motion. No swelling or bruising appreciated.  Skin: no rash or abnormalities  Neurologic Exam: Nonfocal, symmetric DTRs, normal gross motor, tone coordination and no tremor.  Psychiatric Exam: Alert and oriented - appropriate affect.    PHQ Depression Screen  Date of PHQ exam: 01/11/18  Over the last 2 weeks, how often have you been bothered by any of the following problems?  1. Little interest or pleasure in doing things: 0 - Not at all  2. Feeling down, depressed, or hopeless: 0 - Not at all        ASSESSMENT AND PLAN:      ICD-10-CM    1. Acute pain of left knee M25.562 XR KNEE WB 1 VIEW AP BILATERAL AND 2 VIEWS LEFT   2. Hypovitaminosis D E55.9 VITAMIN D 25 (DEFICIENCY)       Completed left knee xray.  I personally reviewed the xray. I found no fracture appreciated upon initial read of xray.  Final read pending by radiology.    Left knee strain:  Encouraged to take ibuprofen (400-800mg) for relief up to 4  times per day.  Encouraged rest and elevation.  Encouraged to use ice or heat 15 minutes at a time several times per day to decrease pain. Return to clinic in 1-2 weeks as necessary for persistent pain. Return to clinic with any change or worsening of symptoms.  Patient can use a knee brace or Ace wrap as needed for comfort.    Your vitamin D level came back low. Please take once weekly for 8 weeks. After completion please take 1500 mg calcium and 1500 units of vitamin D daily to keep your supplies up. Please have your vitamin D level rechecked in 1-2 months.   Lab ordered.         Patient Instructions   Encouraged to take ibuprofen (400-800mg) for relief up to 4 times per day.  Encouraged rest and elevation.  Encouraged to use ice or heat 15 minutes at a time several times per day to decrease pain. Return to clinic in 1-2 weeks as necessary for persistent pain. Return to clinic with any change or worsening of symptoms.      Your vitamin D level came back low. Please take once weekly for 8 weeks. After completion please take 1500 mg calcium and 1500 units of vitamin D daily to keep your supplies up. Please have your vitamin D level rechecked in 1-2 months.       Cristal Naranjo PA-C..................1/11/2018 9:22 AM

## 2018-02-13 NOTE — PROGRESS NOTES
Patient Information     Patient Name MRN Sex Michelle Velasco [id number] Female 1965      Progress Notes by Awilda Gee at 2018  1:26 PM     Author:  Awilda Gee Service:  (none) Author Type:  (none)     Filed:  2018  1:26 PM Encounter Date:  1/15/2018 Status:  Signed     :  Awilda Gee              This encounter was opened in error.  Please disregard.

## 2018-02-13 NOTE — PATIENT INSTRUCTIONS
Patient Information     Patient Name MRN Sex Michelle Velasco [id number] Female 1965      Patient Instructions by Cristal Naranjo PA-C at 2018  9:15 AM     Author:  Cristal Naranjo PA-C  Service:  (none) Author Type:  PHYS- Physician Assistant     Filed:  2018 10:00 AM  Encounter Date:  2018 Status:  Addendum     :  Cristal Naranjo PA-C (PHYS- Physician Assistant)        Related Notes: Original Note by Cristal Naranjo PA-C (PHYS- Physician Assistant) filed at 2018  9:59 AM            Encouraged to take ibuprofen (400-800mg) for relief up to 4 times per day.  Encouraged rest and elevation.  Encouraged to use ice or heat 15 minutes at a time several times per day to decrease pain. Return to clinic in 1-2 weeks as necessary for persistent pain. Return to clinic with any change or worsening of symptoms.      Your vitamin D level came back low. Please take once weekly for 8 weeks. After completion please take 1500 mg calcium and 1500 units of vitamin D daily to keep your supplies up. Please have your vitamin D level rechecked in 1-2 months.

## 2018-07-23 NOTE — PROGRESS NOTES
Patient Information     Patient Name  Michelle Du MRN  [id number] Sex  Female   1965      Letter by Wally Hoyos MD at      Author:  Wally Hoyos MD Service:  (none) Author Type:  (none)    Filed:   Date of Service:   Status:  (Other)       [facility]  [address]  [facility] [address]  [phone]         Michelle Du   [address] [facility] [address]      2017  Date of Breast Imagin2017 10:45 AM    Dear Ms. Du:    We are pleased to inform you that the result of your recent breast imaging examination is normal/benign (not cancer).    A report of your results was sent to your health care provider(s).    Your images will become part of your medical file here at [facility] and will be available for your continuing care. You are responsible for informing any new health care provider or breast imaging facility of the date and location of this examination.    Although mammography is the most accurate method for early detection, not all cancers are found through mammography. If you notice any new changes in your breast(s) please inform your health care provider without delay.    Thank you for choosing [facility] to participate in your healthcare needs.         [facility] Recommendations for Early Breast Cancer Detection   in Women without Symptoms  When to start having mammograms to screen for breast cancer, and how often to have them, is a personal decision. It should be based on your preferences, your values and your risk for developing breast cancer. [facility] recommends that you and your health care provider together determine when mammograms are right for you.    [facility] recommends the following guidelines for women who have an average risk for breast cancer, based on American Cancer Society guidelines:    Age 40 to 44: Mammograms  are optional.     Age 45 to 54: Have a mammogram every year.           Age 55 and older: Have a mammogram every year, or transition to having one every 2 years. Continue to have mammograms as long as your health is good.    If you have a higher than average risk for breast cancer, your health care provider may recommend a different schedule.

## 2018-07-24 NOTE — PROGRESS NOTES
Patient Information     Patient Name  Michelle Du MRN  [id number] Sex  Female   1965      Letter by Casey Simpson MD at      Author:  Casey Simpson MD Service:  (none) Author Type:  (none)    Filed:   Encounter Date:  2018 Status:  (Other)           Michelle Du  [address] [facility] [address]          2018    Dear Ms. Du:    It has come to our attention that you are due for a colonoscopy.  According to our records, your last colonoscopy was done on 13.  It  is time for your repeat colonoscopy.  Please contact the Surgery department at [phone] and we will be happy to set up this colonoscopy for you.  If you have had a colonoscopy since your last one with us, please let us know so we can update our records.      Sincerely,       Natalia JAMES LPN  General Surgery      Reviewed and electronically signed by provider.

## 2018-08-02 ENCOUNTER — OFFICE VISIT (OUTPATIENT)
Dept: FAMILY MEDICINE | Facility: OTHER | Age: 53
End: 2018-08-02
Attending: NURSE PRACTITIONER
Payer: COMMERCIAL

## 2018-08-02 VITALS
BODY MASS INDEX: 49.86 KG/M2 | HEART RATE: 60 BPM | WEIGHT: 268.2 LBS | SYSTOLIC BLOOD PRESSURE: 130 MMHG | DIASTOLIC BLOOD PRESSURE: 90 MMHG

## 2018-08-02 DIAGNOSIS — Z01.818 PREOP GENERAL PHYSICAL EXAM: Primary | ICD-10-CM

## 2018-08-02 DIAGNOSIS — Z12.31 ENCOUNTER FOR SCREENING MAMMOGRAM FOR BREAST CANCER: ICD-10-CM

## 2018-08-02 DIAGNOSIS — M25.561 CHRONIC PAIN OF BOTH KNEES: ICD-10-CM

## 2018-08-02 DIAGNOSIS — M23.203 OLD TEAR OF MEDIAL MENISCUS OF RIGHT KNEE, UNSPECIFIED TEAR TYPE: ICD-10-CM

## 2018-08-02 DIAGNOSIS — M25.562 CHRONIC PAIN OF BOTH KNEES: ICD-10-CM

## 2018-08-02 DIAGNOSIS — G89.29 CHRONIC PAIN OF BOTH KNEES: ICD-10-CM

## 2018-08-02 LAB
ANION GAP SERPL CALCULATED.3IONS-SCNC: 8 MMOL/L (ref 3–14)
BUN SERPL-MCNC: 11 MG/DL (ref 7–25)
CALCIUM SERPL-MCNC: 9.8 MG/DL (ref 8.6–10.3)
CHLORIDE SERPL-SCNC: 104 MMOL/L (ref 98–107)
CO2 SERPL-SCNC: 29 MMOL/L (ref 21–31)
CREAT SERPL-MCNC: 0.83 MG/DL (ref 0.6–1.2)
ERYTHROCYTE [DISTWIDTH] IN BLOOD BY AUTOMATED COUNT: 13.6 % (ref 10–15)
GFR SERPL CREATININE-BSD FRML MDRD: 72 ML/MIN/1.7M2
GLUCOSE SERPL-MCNC: 93 MG/DL (ref 70–105)
HCT VFR BLD AUTO: 43.1 % (ref 35–47)
HGB BLD-MCNC: 13.5 G/DL (ref 11.7–15.7)
MCH RBC QN AUTO: 27.9 PG (ref 26.5–33)
MCHC RBC AUTO-ENTMCNC: 31.3 G/DL (ref 31.5–36.5)
MCV RBC AUTO: 89 FL (ref 78–100)
PLATELET # BLD AUTO: 267 10E9/L (ref 150–450)
POTASSIUM SERPL-SCNC: 4 MMOL/L (ref 3.5–5.1)
RBC # BLD AUTO: 4.84 10E12/L (ref 3.8–5.2)
SODIUM SERPL-SCNC: 141 MMOL/L (ref 134–144)
WBC # BLD AUTO: 6.6 10E9/L (ref 4–11)

## 2018-08-02 PROCEDURE — 85027 COMPLETE CBC AUTOMATED: CPT | Performed by: NURSE PRACTITIONER

## 2018-08-02 PROCEDURE — 99214 OFFICE O/P EST MOD 30 MIN: CPT | Mod: 25 | Performed by: NURSE PRACTITIONER

## 2018-08-02 PROCEDURE — 80048 BASIC METABOLIC PNL TOTAL CA: CPT | Performed by: NURSE PRACTITIONER

## 2018-08-02 PROCEDURE — 36415 COLL VENOUS BLD VENIPUNCTURE: CPT | Performed by: NURSE PRACTITIONER

## 2018-08-02 PROCEDURE — 93000 ELECTROCARDIOGRAM COMPLETE: CPT | Performed by: INTERNAL MEDICINE

## 2018-08-02 ASSESSMENT — ANXIETY QUESTIONNAIRES
GAD7 TOTAL SCORE: 0
IF YOU CHECKED OFF ANY PROBLEMS ON THIS QUESTIONNAIRE, HOW DIFFICULT HAVE THESE PROBLEMS MADE IT FOR YOU TO DO YOUR WORK, TAKE CARE OF THINGS AT HOME, OR GET ALONG WITH OTHER PEOPLE: NOT DIFFICULT AT ALL
6. BECOMING EASILY ANNOYED OR IRRITABLE: NOT AT ALL
3. WORRYING TOO MUCH ABOUT DIFFERENT THINGS: NOT AT ALL
4. TROUBLE RELAXING: NOT AT ALL
5. BEING SO RESTLESS THAT IT IS HARD TO SIT STILL: NOT AT ALL
7. FEELING AFRAID AS IF SOMETHING AWFUL MIGHT HAPPEN: NOT AT ALL
1. FEELING NERVOUS, ANXIOUS, OR ON EDGE: NOT AT ALL
2. NOT BEING ABLE TO STOP OR CONTROL WORRYING: NOT AT ALL

## 2018-08-02 ASSESSMENT — PAIN SCALES - GENERAL: PAINLEVEL: NO PAIN (0)

## 2018-08-02 NOTE — Clinical Note
Date of Surgery/ Procedure: 08/07/2018 Time of Surgery/ Procedure: D Hospital/Surgical Facility: [facility] with Dr. Gupta

## 2018-08-02 NOTE — NURSING NOTE
This patient presents today for a Preoperative exam for this procedure: Rt knee scope and left knee injection   Date of Surgery: 8/7/2018   Surgeon:  Dr. Gupta  Facility:  [facility]    Doug Chavis LPN..............8/2/2018 1:13 PM

## 2018-08-02 NOTE — PROGRESS NOTES
[facility] AND [facility]  [address]  [place] [address]  [phone]    PRE-OP EVALUATION:  Today's date: 2018    Michelle Du (: 1965) presents for pre-operative evaluation assessment as requested by Dr. Gupta.  She requires evaluation and anesthesia risk assessment prior to undergoing surgery/procedure for treatment of Chronic bilateral knee pain .    Proposed Surgery/ Procedure: arthroscopy of right knee with possible repair of tear, left knee steroid injection  Date of Surgery/ Procedure: 2018  Time of Surgery/ Procedure: [facility]  Hospital/Surgical Facility: [facility]  Primary Physician: Tad Ortiz  Type of Anesthesia Anticipated: General    Patient has a Health Care Directive or Living Will:  NO    1. NO - Do you have a history of heart attack, stroke, stent, bypass or surgery on an artery in the head, neck, heart or legs?  2. NO - Do you ever have any pain or discomfort in your chest?  3. NO - Do you have a history of  Heart Failure?  4. NO - Are you troubled by shortness of breath when: walking on the level, up a slight hill or at night?  5. NO - Do you currently have a cold, bronchitis or other respiratory infection?  6. NO - Do you have a cough, shortness of breath or wheezing?  7. NO - Do you sometimes get pains in the calves of your legs when you walk?  8. NO - Do you or anyone in your family have previous history of blood clots?  9. NO - Do you or does anyone in your family have a serious bleeding problem such as prolonged bleeding following surgeries or cuts?  10. NO - Have you ever had problems with anemia or been told to take iron pills?  11. NO - Have you had any abnormal blood loss such as black, tarry or bloody stools, or abnormal vaginal bleeding?  12. YES - HAVE YOU EVER HAD A BLOOD TRANSFUSION? History of diverticulitis resulting in decreased hemoglobin and blood transfusion  13. NO - Have you or any of your  relatives ever had problems with anesthesia?  14. YES - DO YOU HAVE SLEEP APNEA, EXCESSIVE SNORING OR DAYTIME DROWSINESS? Sleep Apnea- uses CPAP  15. NO - Do you have any prosthetic heart valves?  16. NO - Do you have prosthetic joints?  17. NO - Is there any chance that you may be pregnant? Menopausal       HPI:     HPI related to upcoming procedure: Chronic bilateral knee pain      HYPERLIPIDEMIA - Patient has a history of Hyperlipidemia. Not on medication for treatment.  No results found for: CHOL    Lab Results   Component Value Date    HDL 48 2017    HDL 61 2014     Lab Results   Component Value Date     2017     2014     Lab Results   Component Value Date    TRIG 148 2017    TRIG 88 2014     GERD- Patient has a history of GERD. Takes omeprazole as needed and tries to avoid identified triggers.      MEDICAL HISTORY:     Patient Active Problem List    Diagnosis Date Noted     Obesity 2018     Priority: Medium     Overview:   BMI - 49       Hypovitaminosis D 2017     Priority: Medium     Old tear of medial meniscus of right knee 2017     Priority: Medium     Tubular adenoma of colon 2014     Priority: Medium     Backache 06/10/2013     Priority: Medium     Anemia 2013     Priority: Medium     Hyperlipidemia 10/01/2010     Priority: Medium      Past Medical History:   Diagnosis Date     Benign neoplasm of colon     2014,2013-repeat colonoscopy in 2018     Diverticulosis of large intestine without perforation or abscess without bleeding     3/29/2013     Gastritis without bleeding     3/29/2013     Gastro-esophageal reflux disease without esophagitis     No Comments Provided     History of colonic polyps     3/29/2013     Insomnia     No Comments Provided     Urge incontinence     2011     Past Surgical History:   Procedure Laterality Date      SECTION      x 2     COLONOSCOPY      3/29/13,Colonoscopy F/U       ESOPHAGOSCOPY, GASTROSCOPY, DUODENOSCOPY (EGD), COMBINED      3/29 13,EGD     LAPAROSCOPIC TUBAL LIGATION      No Comments Provided     Current Outpatient Prescriptions   Medication Sig Dispense Refill     omeprazole (PRILOSEC) 20 MG CR capsule Take 40 mg by mouth daily as needed For GI upset       OTC products: Excedrin migraine for aches and pains. Has not taken since yesterday morning.    Allergies   Allergen Reactions     Penicillins Rash     Sulfa Drugs Rash      Latex Allergy: NO    Social History   Substance Use Topics     Smoking status: Never Smoker     Smokeless tobacco: Never Used     Alcohol use No     History   Drug Use Not on file     Comment: Drug use: No       REVIEW OF SYSTEMS:     CONSTITUTIONAL: NEGATIVE for fever, chills, change in weight  INTEGUMENTARY/SKIN: NEGATIVE for worrisome rashes, moles or lesions  EYES: NEGATIVE for vision changes or irritation  ENT/MOUTH: NEGATIVE for ear, mouth and throat problems  RESP: NEGATIVE for significant cough or SOB  CV: Reports history of cardiac murmur NEGATIVE for chest pain, palpitations or peripheral edema  GI: POSITIVE for GERD NEGATIVE for nausea, abdominal pain, or change in bowel habits  : NEGATIVE for frequency, dysuria, or hematuria  MUSCULOSKELETAL: POSITIVE for bilateral knee pain  NEURO: NEGATIVE for weakness, dizziness or paresthesias  ENDOCRINE: NEGATIVE for temperature intolerance, skin/hair changes  HEME: NEGATIVE for bleeding problems  PSYCHIATRIC: NEGATIVE for changes in mood or affect    EXAM:   /90 (BP Location: Right arm, Patient Position: Sitting, Cuff Size: Adult Large)  Pulse 60  Wt 268 lb 3.2 oz (121.7 kg)  LMP 05/25/2018  BMI 49.86 kg/m2      GENERAL APPEARANCE: healthy, alert and no distress     EYES: EOMI,  PERRL     HENT: ear canals and TM's normal and nose and mouth without ulcers or lesions     NECK: no adenopathy, no asymmetry, masses, or scars and thyroid normal to palpation     RESP: lungs clear to  auscultation - no rales, rhonchi or wheezes     CV: regular rates and rhythm, normal S1 S2, no S3 or S4 and no murmur, click or rub, trace bilateral lower extremity edema     ABDOMEN:  soft, nontender, no HSM or masses and bowel sounds normal     MS: extremities normal- no gross deformities noted, no evidence of inflammation in joints, FROM in all extremities.     SKIN: no suspicious lesions or rashes     NEURO: Normal strength and tone, sensory exam grossly normal, mentation intact and speech normal     PSYCH: mentation appears normal. and affect normal/bright     LYMPHATICS: No cervical adenopathy    DIAGNOSTICS:     EKG: appears normal, NSR, HR 68  QT/QTC: 440/467    Labs Resulted Today:   Results for orders placed or performed in visit on 08/02/18   Basic metabolic panel   Result Value Ref Range    Sodium 141 134 - 144 mmol/L    Potassium 4.0 3.5 - 5.1 mmol/L    Chloride 104 98 - 107 mmol/L    Carbon Dioxide 29 21 - 31 mmol/L    Anion Gap 8 3 - 14 mmol/L    Glucose 93 70 - 105 mg/dL    Urea Nitrogen 11 7 - 25 mg/dL    Creatinine 0.83 0.60 - 1.20 mg/dL    GFR Estimate 72 >60 mL/min/1.7m2    GFR Estimate If Black 88 >60 mL/min/1.7m2    Calcium 9.8 8.6 - 10.3 mg/dL   CBC W PLT No Diff   Result Value Ref Range    WBC 6.6 4.0 - 11.0 10e9/L    RBC Count 4.84 3.8 - 5.2 10e12/L    Hemoglobin 13.5 11.7 - 15.7 g/dL    Hematocrit 43.1 35.0 - 47.0 %    MCV 89 78 - 100 fl    MCH 27.9 26.5 - 33.0 pg    MCHC 31.3 (L) 31.5 - 36.5 g/dL    RDW 13.6 10.0 - 15.0 %    Platelet Count 267 150 - 450 10e9/L       Recent Labs   Lab Test  12/13/17   1440  12/13/17   1427  04/14/17   0829  04/14/17   0806   03/25/13   0827   HGB   --   13.3   --   13.7   < >   --    PLT   --   256   --   277   < >   --    INR   --    --    --    --    --   1.2   NA  138   --   140   --    < >   --    POTASSIUM  3.6   --   4.3   --    < >   --    CR  0.73   --   0.76   --    < >   --     < > = values in this interval not displayed.        IMPRESSION:    Reason for surgery/procedure: Chronic bilateral knee pain  Diagnosis/reason for consult: Preoperative Physical examination    The proposed surgical procedure is considered INTERMEDIATE risk.    REVISED CARDIAC RISK INDEX  The patient has the following serious cardiovascular risks for perioperative complications such as (MI, PE, VFib and 3  AV Block):  No serious cardiac risks  INTERPRETATION: 0 risks: Class I (very low risk - 0.4% complication rate)    The patient has the following additional risks for perioperative complications:  Morbid obesity  Sleep apnea with use of CPAP      ICD-10-CM    1. Preop general physical exam Z01.818        RECOMMENDATIONS:     1. Preop general physical exam  Normal examination  - EKG 12-LEAD RADIOLOGY  - Basic metabolic panel; Future  - CBC W PLT No Diff; Future      2. Old tear of medial meniscus of right knee, unspecified tear type      3. Chronic pain of both knees      4. Class 3 obesity without serious comorbidity with body mass index (BMI) of 45.0 to 49.9 in adult, unspecified obesity type (H)  Chronic    5. Encounter for screening mammogram for breast cancer  Routine, patient would like to schedule prior to her routine annual physical with Dr. Ortiz later this month.  - MA Screening Digital Bilateral; Future        APPROVAL GIVEN to proceed with proposed procedure, without further diagnostic evaluation       Signed Electronically by: Ewa Kingston CNP    Copy of this evaluation report is provided to requesting physician.    [facility] Preop Guidelines    Revised Cardiac Risk Index

## 2018-08-02 NOTE — LETTER
August 2, 2018    Michelle Du  [address] [facility] [address]      Dear ,    We are writing to inform you of your test results.    Your test results fall within the expected range(s).    Resulted Orders   Basic metabolic panel   Result Value Ref Range    Sodium 141 134 - 144 mmol/L    Potassium 4.0 3.5 - 5.1 mmol/L    Chloride 104 98 - 107 mmol/L    Carbon Dioxide 29 21 - 31 mmol/L    Anion Gap 8 3 - 14 mmol/L    Glucose 93 70 - 105 mg/dL    Urea Nitrogen 11 7 - 25 mg/dL    Creatinine 0.83 0.60 - 1.20 mg/dL    GFR Estimate 72 >60 mL/min/1.7m2    GFR Estimate If Black 88 >60 mL/min/1.7m2    Calcium 9.8 8.6 - 10.3 mg/dL   CBC W PLT No Diff   Result Value Ref Range    WBC 6.6 4.0 - 11.0 10e9/L    RBC Count 4.84 3.8 - 5.2 10e12/L    Hemoglobin 13.5 11.7 - 15.7 g/dL    Hematocrit 43.1 35.0 - 47.0 %    MCV 89 78 - 100 fl    MCH 27.9 26.5 - 33.0 pg    MCHC 31.3 (L) 31.5 - 36.5 g/dL    RDW 13.6 10.0 - 15.0 %    Platelet Count 267 150 - 450 10e9/L   If you have any questions or concerns, please call the clinic at the number listed above.     Sincerely,    Ewa Kingston, CNP

## 2018-08-02 NOTE — MR AVS SNAPSHOT
After Visit Summary   8/2/2018    Michelle Du    MRN: [id number]           Patient Information     Date Of Birth          1965        Visit Information        Provider Department      8/2/2018 1:15 PM Ewa Kingston CNP [facility]        Today's Diagnoses     Preop general physical exam    -  1    Old tear of medial meniscus of right knee, unspecified tear type        Chronic pain of both knees        Class 3 obesity without serious comorbidity with body mass index (BMI) of 45.0 to 49.9 in adult, unspecified obesity type (H)        Encounter for screening mammogram for breast cancer          Care Instructions      Before Your Surgery      Call your surgeon if there is any change in your health. This includes signs of a cold or flu (such as a sore throat, runny nose, cough, rash or fever).    Do not smoke, drink alcohol or take over the counter medicine (unless your surgeon or primary care doctor tells you to) for the 24 hours before and after surgery.    If you take prescribed drugs: Follow your doctor s orders about which medicines to take and which to stop until after surgery.    Eating and drinking prior to surgery: follow the instructions from your surgeon    Take a shower or bath the night before surgery. Use the soap your surgeon gave you to gently clean your skin. If you do not have soap from your surgeon, use your regular soap. Do not shave or scrub the surgery site.  Wear clean pajamas and have clean sheets on your bed.           Follow-ups after your visit        Your next 10 appointments already scheduled     Aug 23, 2018  1:15 PM CDT   Office Visit with Tad Ortiz MD   [facility] ([facility])    [address]  [facility] [address] [phone]           Bring a current list of meds and any records pertaining to this visit. For Physicals, please bring immunization records and any forms  "needing to be filled out. Please arrive 10 minutes early to complete paperwork.              Future tests that were ordered for you today     Open Future Orders        Priority Expected Expires Ordered    MA Screening Digital Bilateral Routine  2019            Who to contact     If you have questions or need follow up information about today's clinic visit or your schedule please contact [facility] directly at [phone].  Normal or non-critical lab and imaging results will be communicated to you by GAM[employer]IDEhart, letter or phone within 4 business days after the clinic has received the results. If you do not hear from us within 7 days, please contact the clinic through [employer] or phone. If you have a critical or abnormal lab result, we will notify you by phone as soon as possible.  Submit refill requests through [employer] or call your pharmacy and they will forward the refill request to us. Please allow 3 business days for your refill to be completed.          Additional Information About Your Visit        [employer] Information     [facility] lets you send messages to your doctor, view your test results, renew your prescriptions, schedule appointments and more. To sign up, go to [email or web address] . Click on \"Log in\" on the left side of the screen, which will take you to the Welcome page. Then click on \"Sign up Now\" on the right side of the page.     You will be asked to enter the access code listed below, as well as some personal information. Please follow the directions to create your username and password.     Your access code is: [email or web address]  Expires: 2018  2:58 PM     Your access code will  in 90 days. If you need help or a new code, please call your [place] clinic or [phone].        Care EveryWhere ID     This is your Care EveryWhere ID. This could be used by other organizations to access your [place] medical records  [id number]        Your Vitals Were     " Pulse Last Period BMI (Body Mass Index)             60 05/25/2018 49.86 kg/m2          Blood Pressure from Last 3 Encounters:   08/02/18 130/90   01/15/18 126/88   01/11/18 128/74    Weight from Last 3 Encounters:   08/02/18 268 lb 3.2 oz (121.7 kg)   01/15/18 264 lb 6.4 oz (119.9 kg)   01/11/18 264 lb 6.4 oz (119.9 kg)              We Performed the Following     Basic metabolic panel     CBC W PLT No Diff     EKG 12-LEAD RADIOLOGY        Primary Care Provider Office Phone # Fax #    Tad Ortiz [phone] [address] [facility] [address]        Equal Access to Services     JAMES MAHONEY : Hadii aad ku hadasho Sosaima, waaxda luqadaha, qaybta kaalmada adeegyawallace, jodie machado . So [facility] [phone].    ATENCIÓN: Si habla español, tiene a marroquin disposición servicios gratuitos de asistencia lingüística. Llame al [phone].    We comply with applicable federal civil rights laws and [place] laws. We do not discriminate on the basis of race, color, national origin, age, disability, sex, sexual orientation, or gender identity.            Thank you!     Thank you for choosing [facility]  for your care. Our goal is always to provide you with excellent care. Hearing back from our patients is one way we can continue to improve our services. Please take a few minutes to complete the written survey that you may receive in the mail after your visit with us. Thank you!             Your Updated Medication List - Protect others around you: Learn how to safely use, store and throw away your medicines at www.disposemymeds.org.          This list is accurate as of 8/2/18 11:59 PM.  Always use your most recent med list.                   Brand Name Dispense Instructions for use Diagnosis    omeprazole 20 MG CR capsule    priLOSEC     Take 40 mg by mouth daily as needed For GI upset

## 2018-08-03 ASSESSMENT — ANXIETY QUESTIONNAIRES: GAD7 TOTAL SCORE: 0

## 2018-08-03 ASSESSMENT — PATIENT HEALTH QUESTIONNAIRE - PHQ9: SUM OF ALL RESPONSES TO PHQ QUESTIONS 1-9: 0

## 2018-08-13 ENCOUNTER — HOSPITAL ENCOUNTER (OUTPATIENT)
Dept: MAMMOGRAPHY | Facility: OTHER | Age: 53
Discharge: HOME | End: 2018-08-13
Attending: NURSE PRACTITIONER | Admitting: NURSE PRACTITIONER
Payer: COMMERCIAL

## 2018-08-13 DIAGNOSIS — Z12.31 ENCOUNTER FOR SCREENING MAMMOGRAM FOR BREAST CANCER: ICD-10-CM

## 2018-08-13 PROCEDURE — 77067 SCR MAMMO BI INCL CAD: CPT

## 2018-11-09 ENCOUNTER — OFFICE VISIT (OUTPATIENT)
Dept: FAMILY MEDICINE | Facility: OTHER | Age: 53
End: 2018-11-09
Attending: FAMILY MEDICINE
Payer: COMMERCIAL

## 2018-11-09 VITALS
HEIGHT: 62 IN | DIASTOLIC BLOOD PRESSURE: 90 MMHG | BODY MASS INDEX: 50.68 KG/M2 | HEART RATE: 68 BPM | WEIGHT: 275.4 LBS | SYSTOLIC BLOOD PRESSURE: 134 MMHG

## 2018-11-09 DIAGNOSIS — M17.12 PRIMARY OSTEOARTHRITIS OF LEFT KNEE: Primary | ICD-10-CM

## 2018-11-09 PROCEDURE — 99213 OFFICE O/P EST LOW 20 MIN: CPT | Performed by: FAMILY MEDICINE

## 2018-11-09 ASSESSMENT — PAIN SCALES - GENERAL: PAINLEVEL: EXTREME PAIN (9)

## 2018-11-09 NOTE — PATIENT INSTRUCTIONS
"Tylenol - maximum is a total of 3000mg a day   Regular Tylenol is 325mg (9/day)   Extra strength Tylenol is 500 mg (6/day)     Ibuprofen:  Total daily maximum is 2400mg   Single tablet is 200mg    People will frequently take 3 tablets up to 4 times a day (with food)     Ice  - it takes about 15 minutes to get a full \"dose\" of icing     Let's say it's a week from now and things aren't letting up - call Dr Gupta   "

## 2018-11-09 NOTE — MR AVS SNAPSHOT
"              After Visit Summary   11/9/2018    Michelle Du    MRN: [id number]           Patient Information     Date Of Birth          1965        Visit Information        Provider Department      11/9/2018 3:00 PM Bhakti Lopez MD [facility]        Care Instructions    Tylenol - maximum is a total of 3000mg a day   Regular Tylenol is 325mg (9/day)   Extra strength Tylenol is 500 mg (6/day)     Ibuprofen:  Total daily maximum is 2400mg   Single tablet is 200mg    People will frequently take 3 tablets up to 4 times a day (with food)     Ice  - it takes about 15 minutes to get a full \"dose\" of icing     Let's say it's a week from now and things aren't letting up - call Dr Gupta           Follow-ups after your visit        Who to contact     If you have questions or need follow up information about today's clinic visit or your schedule please contact [facility] [place] [facility] directly at [phone].  Normal or non-critical lab and imaging results will be communicated to you by Magic Wheelst, letter or phone within 4 business days after the clinic has received the results. If you do not hear from us within 7 days, please contact the clinic through [facility] or phone. If you have a critical or abnormal lab result, we will notify you by phone as soon as possible.  Submit refill requests through [facility] or call your pharmacy and they will forward the refill request to us. Please allow 3 business days for your refill to be completed.          Additional Information About Your Visit        [facility] Information     [facility] lets you send messages to your doctor, view your test results, renew your prescriptions, schedule appointments and more. To sign up, go to [email or web address] . Click on \"Log in\" on the left side of the screen, which will take you to the Welcome page. Then click on \"Sign up Now\" on the right side of the page.     You will be asked to enter the access code " "listed below, as well as some personal information. Please follow the directions to create your username and password.     Your access code is: [email or web address]  Expires: 2019  3:18 PM     Your access code will  in 90 days. If you need help or a new code, please call your [place] clinic or [phone].        Care EveryWhere ID     This is your Care EveryWhere ID. This could be used by other organizations to access your [place] medical records  [id number]        Your Vitals Were     Pulse Height BMI (Body Mass Index)             68 5' 1.5\" (1.562 m) 51.19 kg/m2          Blood Pressure from Last 3 Encounters:   18 134/90   18 130/90   01/15/18 126/88    Weight from Last 3 Encounters:   18 275 lb 6.4 oz (124.9 kg)   18 268 lb 3.2 oz (121.7 kg)   01/15/18 264 lb 6.4 oz (119.9 kg)              Today, you had the following     No orders found for display       Primary Care Provider Office Phone # Fax #    Tad Ortiz [phone] [address] [facility] [address]        Equal Access to Services     [facility] Center[facility] AH: Hadii demond sidhu hadasho Soomaali, waaxda luqadaha, qaybta kaalmada adeegyada, jodie bass. So [facility] [phone].    ATENCIÓN: Si habla español, tiene a marroquin disposición servicios gratuitos de asistencia lingüística. Llame al [phone].    We comply with applicable federal civil rights laws and [place] laws. We do not discriminate on the basis of race, color, national origin, age, disability, sex, sexual orientation, or gender identity.            Thank you!     Thank you for choosing [facility] AND [facility]  for your care. Our goal is always to provide you with excellent care. Hearing back from our patients is one way we can continue to improve our services. Please take a few minutes to complete the written survey that you may receive in the mail after your visit with us. Thank you!           "   Your Updated Medication List - Protect others around you: Learn how to safely use, store and throw away your medicines at www.disposemymeds.org.          This list is accurate as of 11/9/18  3:18 PM.  Always use your most recent med list.                   Brand Name Dispense Instructions for use Diagnosis    omeprazole 20 MG CR capsule    priLOSEC     Take 40 mg by mouth daily as needed For GI upset

## 2018-11-09 NOTE — NURSING NOTE
Patient presents to the clinic today for left knee pain. She had a knee injection 8/7/18. Pain has been on and off since then but for the past 3 days has been constant.    Leigh Connors LPN.................. 11/9/2018 2:56 PM

## 2018-11-09 NOTE — PROGRESS NOTES
Nursing Notes:   Leigh Connors LPN  2018  3:01 PM  Signed  Patient presents to the clinic today for left knee pain. She had a knee injection 18. Pain has been on and off since then but for the past 3 days has been constant.    Leigh Connors LPN.................. 2018 2:56 PM       SUBJECTIVE:   CC:  Michelle Du is a 53 year old female who presents to clinic today for the following health issues:  Left knee pain    HPI  Michelle Du is a 53 year old female in for evaluation of left knee pain.  On 18 she had right knee surgery and at the same time she had an injection done in her left knee.  This was done due to arthritis and pain.  With the injection, she had about 2 months of improvement in her pain, worsening over the past month.  The past 3 days though, the pain has been more constant.  She has not had any new injuries, falls.  No chills or fever.  Her right knee continues to heal from her surgery.    She's had ice, tylenol, excedrin, and aleve some today.      Social history: She works at [employer], does the online shopping pickup.     Allergies   Allergen Reactions     Penicillins Rash     Sulfa Drugs Rash     Current Outpatient Prescriptions   Medication     omeprazole (PRILOSEC) 20 MG CR capsule     No current facility-administered medications for this visit.       Past Medical History:   Diagnosis Date     Benign neoplasm of colon     2014,2013-repeat colonoscopy in 2018     Diverticulosis of large intestine without perforation or abscess without bleeding     3/29/2013     Gastritis without bleeding     3/29/2013     Gastro-esophageal reflux disease without esophagitis     No Comments Provided     History of colonic polyps     3/29/2013     Insomnia     No Comments Provided     Urge incontinence     2011      Past Surgical History:   Procedure Laterality Date      SECTION      x 2     COLONOSCOPY      3/29/13,Colonoscopy F/U 2018     ESOPHAGOSCOPY,  "GASTROSCOPY, DUODENOSCOPY (EGD), COMBINED      3/29 13,EGD     LAPAROSCOPIC TUBAL LIGATION      No Comments Provided       Review of Systems   Constitutional: Negative.    Respiratory: Negative.         PHQ-2 Score:     PHQ-2 ( 1999 Pf[employer]) 11/9/2018 8/2/2018   Q1: Little interest or pleasure in doing things 0 0   Q2: Feeling down, depressed or hopeless 0 0   PHQ-2 Score 0 0         PHQ-9 SCORE 8/5/2015 8/2/2018   Total Score 2 0         OBJECTIVE:     /90 (BP Location: Right arm, Patient Position: Sitting, Cuff Size: Adult Large)  Pulse 68  Ht 5' 1.5\" (1.562 m)  Wt 275 lb 6.4 oz (124.9 kg)  BMI 51.19 kg/m2  Body mass index is 51.19 kg/(m^2).  Physical Exam   Constitutional: She appears well-developed and well-nourished.   Musculoskeletal:   Her right knee has very minimal swelling.  Her laparoscopy sites are well-healed.  With left knee, there is 1+ crepitus, moderate medial joint line tenderness.  This does not extend posteriorly.  No erythema warmth or swelling.   Nursing note and vitals reviewed.           ASSESSMENT/PLAN:       ICD-10-CM    1. Primary osteoarthritis of left knee M17.12             PLAN:  1.  Patient primarily wanted some over-the-counter recommendations for her knee and instructions for when to follow-up with her orthopedic surgeon.  Follow-up plan is as below:  Patient Instructions   Tylenol - maximum is a total of 3000mg a day   Regular Tylenol is 325mg (9/day)   Extra strength Tylenol is 500 mg (6/day)     Ibuprofen:  Total daily maximum is 2400mg   Single tablet is 200mg    People will frequently take 3 tablets up to 4 times a day (with food)     Ice  - it takes about 15 minutes to get a full \"dose\" of icing     Let's say it's a week from now and things aren't letting up - call Dr Candy FIGUEROA MD  [facility] [place]    This note was created using voice recognition software and was screened for errors in transcription.    "

## 2018-11-14 ASSESSMENT — ENCOUNTER SYMPTOMS
CONSTITUTIONAL NEGATIVE: 1
RESPIRATORY NEGATIVE: 1

## 2018-11-28 ENCOUNTER — OFFICE VISIT (OUTPATIENT)
Dept: FAMILY MEDICINE | Facility: OTHER | Age: 53
End: 2018-11-28
Attending: NURSE PRACTITIONER
Payer: COMMERCIAL

## 2018-11-28 VITALS
HEIGHT: 62 IN | SYSTOLIC BLOOD PRESSURE: 122 MMHG | TEMPERATURE: 97.8 F | DIASTOLIC BLOOD PRESSURE: 80 MMHG | WEIGHT: 272 LBS | BODY MASS INDEX: 50.05 KG/M2 | OXYGEN SATURATION: 96 % | HEART RATE: 82 BPM

## 2018-11-28 DIAGNOSIS — Z01.818 PREOP GENERAL PHYSICAL EXAM: Primary | ICD-10-CM

## 2018-11-28 DIAGNOSIS — Z28.21 INFLUENZA VACCINATION DECLINED: ICD-10-CM

## 2018-11-28 DIAGNOSIS — M17.12 ARTHRITIS OF LEFT KNEE: ICD-10-CM

## 2018-11-28 LAB
ANION GAP SERPL CALCULATED.3IONS-SCNC: 8 MMOL/L (ref 3–14)
BUN SERPL-MCNC: 22 MG/DL (ref 7–25)
CALCIUM SERPL-MCNC: 9.3 MG/DL (ref 8.6–10.3)
CHLORIDE SERPL-SCNC: 104 MMOL/L (ref 98–107)
CO2 SERPL-SCNC: 29 MMOL/L (ref 21–31)
CREAT SERPL-MCNC: 0.65 MG/DL (ref 0.6–1.2)
ERYTHROCYTE [DISTWIDTH] IN BLOOD BY AUTOMATED COUNT: 13.2 % (ref 10–15)
GFR SERPL CREATININE-BSD FRML MDRD: >90 ML/MIN/1.7M2
GLUCOSE SERPL-MCNC: 88 MG/DL (ref 70–105)
HCT VFR BLD AUTO: 41.5 % (ref 35–47)
HGB BLD-MCNC: 13.5 G/DL (ref 11.7–15.7)
MCH RBC QN AUTO: 28.9 PG (ref 26.5–33)
MCHC RBC AUTO-ENTMCNC: 32.5 G/DL (ref 31.5–36.5)
MCV RBC AUTO: 89 FL (ref 78–100)
PLATELET # BLD AUTO: 258 10E9/L (ref 150–450)
POTASSIUM SERPL-SCNC: 4.4 MMOL/L (ref 3.5–5.1)
RBC # BLD AUTO: 4.67 10E12/L (ref 3.8–5.2)
SODIUM SERPL-SCNC: 141 MMOL/L (ref 134–144)
WBC # BLD AUTO: 8 10E9/L (ref 4–11)

## 2018-11-28 PROCEDURE — 85027 COMPLETE CBC AUTOMATED: CPT | Performed by: NURSE PRACTITIONER

## 2018-11-28 PROCEDURE — 99214 OFFICE O/P EST MOD 30 MIN: CPT | Performed by: NURSE PRACTITIONER

## 2018-11-28 PROCEDURE — 36415 COLL VENOUS BLD VENIPUNCTURE: CPT | Performed by: NURSE PRACTITIONER

## 2018-11-28 PROCEDURE — 80048 BASIC METABOLIC PNL TOTAL CA: CPT | Performed by: NURSE PRACTITIONER

## 2018-11-28 NOTE — LETTER
November 28, 2018      Michelle Du  [address] [facility] [address]        Dear ,    We are writing to inform you of your test results.    Your blood count and electrolytes are normal. Your kidney functioning is normal.    Resulted Orders   Basic Metabolic Panel   Result Value Ref Range    Sodium 141 134 - 144 mmol/L    Potassium 4.4 3.5 - 5.1 mmol/L    Chloride 104 98 - 107 mmol/L    Carbon Dioxide 29 21 - 31 mmol/L    Anion Gap 8 3 - 14 mmol/L    Glucose 88 70 - 105 mg/dL    Urea Nitrogen 22 7 - 25 mg/dL    Creatinine 0.65 0.60 - 1.20 mg/dL    GFR Estimate >90 >60 mL/min/1.7m2    GFR Estimate If Black >90 >60 mL/min/1.7m2    Calcium 9.3 8.6 - 10.3 mg/dL   CBC W PLT No Diff   Result Value Ref Range    WBC 8.0 4.0 - 11.0 10e9/L    RBC Count 4.67 3.8 - 5.2 10e12/L    Hemoglobin 13.5 11.7 - 15.7 g/dL    Hematocrit 41.5 35.0 - 47.0 %    MCV 89 78 - 100 fl    MCH 28.9 26.5 - 33.0 pg    MCHC 32.5 31.5 - 36.5 g/dL    RDW 13.2 10.0 - 15.0 %    Platelet Count 258 150 - 450 10e9/L         Good luck with your upcoming procedure!    If you have any questions or concerns, please call the clinic at the number listed above.       Sincerely,        Ewa Kingston, CNP

## 2018-11-28 NOTE — MR AVS SNAPSHOT
After Visit Summary   11/28/2018    Michelle Du    MRN: [id number]           Patient Information     Date Of Birth          1965        Visit Information        Provider Department      11/28/2018 1:00 PM Ewa Kingston CNP [facility]        Today's Diagnoses     Preop general physical exam    -  1    Arthritis of left knee        Influenza vaccination declined          Care Instructions      Before Your Surgery      Call your surgeon if there is any change in your health. This includes signs of a cold or flu (such as a sore throat, runny nose, cough, rash or fever).    Do not smoke, drink alcohol or take over the counter medicine (unless your surgeon or primary care doctor tells you to) for the 24 hours before and after surgery.    If you take prescribed drugs: Follow your doctor s orders about which medicines to take and which to stop until after surgery.    Eating and drinking prior to surgery: follow the instructions from your surgeon    Take a shower or bath the night before surgery. Use the soap your surgeon gave you to gently clean your skin. If you do not have soap from your surgeon, use your regular soap. Do not shave or scrub the surgery site.  Wear clean pajamas and have clean sheets on your bed.           Follow-ups after your visit        Who to contact     If you have questions or need follow up information about today's clinic visit or your schedule please contact [facility] \[facility] [place] [facility]\"" directly at [phone].  Normal or non-critical lab and imaging results will be communicated to you by MyChart, letter or phone within 4 business days after the clinic has received the results. If you do not hear from us within 7 days, please contact the clinic through MyChart or phone. If you have a critical or abnormal lab result, we will notify you by phone as soon as possible.  Submit refill requests through Science or call your pharmacy and they will  "forward the refill request to us. Please allow 3 business days for your refill to be completed.          Additional Information About Your Visit        [employer] Information     [employer] lets you send messages to your doctor, view your test results, renew your prescriptions, schedule appointments and more. To sign up, go to [email or web address] . Click on \"Log in\" on the left side of the screen, which will take you to the Welcome page. Then click on \"Sign up Now\" on the right side of the page.     You will be asked to enter the access code listed below, as well as some personal information. Please follow the directions to create your username and password.     Your access code is: [email or web address]  Expires: 2019  3:18 PM     Your access code will  in 90 days. If you need help or a new code, please call your [place] clinic or [phone].        Care EveryWhere ID     This is your [facility] EveryWhere ID. This could be used by other organizations to access your [place] medical records  [id number]        Your Vitals Were     Pulse Temperature Height Last Period Pulse Oximetry Breastfeeding?    82 97.8  F (36.6  C) (Tympanic) 5' 1.75\" (1.568 m) 2018 (Exact Date) 96% No    BMI (Body Mass Index)                   50.15 kg/m2            Blood Pressure from Last 3 Encounters:   18 122/80   18 134/90   18 130/90    Weight from Last 3 Encounters:   18 272 lb (123.4 kg)   18 275 lb 6.4 oz (124.9 kg)   18 268 lb 3.2 oz (121.7 kg)              We Performed the Following     Basic Metabolic Panel     CBC W PLT No Diff        Primary Care Provider Office Phone # Fax #    Tad Ortiz [phone] [address] [facility] [address]        Equal Access to Services     [facility] MARU AH: Fernanda Gutierrez, wajocelyne shabazz, jodie curry. [facility] [phone].    ATENCIÓN: Si habla " español, tiene a marroquin disposición servicios gratuitos de asistencia lingüística. Eliecer martinez [phone].    We comply with applicable federal civil rights laws and [place] laws. We do not discriminate on the basis of race, color, national origin, age, disability, sex, sexual orientation, or gender identity.            Thank you!     Thank you for choosing [facility]  for your care. Our goal is always to provide you with excellent care. Hearing back from our patients is one way we can continue to improve our services. Please take a few minutes to complete the written survey that you may receive in the mail after your visit with us. Thank you!             Your Updated Medication List - Protect others around you: Learn how to safely use, store and throw away your medicines at www.disposemymeds.org.          This list is accurate as of 11/28/18  2:33 PM.  Always use your most recent med list.                   Brand Name Dispense Instructions for use Diagnosis    omeprazole 20 MG DR capsule    priLOSEC     Take 40 mg by mouth daily as needed For GI upset

## 2018-11-28 NOTE — PROGRESS NOTES
[facility] AND \[facility]\""  [address]  [place] [address] [phone]    PRE-OP EVALUATION:  Today's date: 2018    Michelle Du (: 1965) presents for pre-operative evaluation assessment as requested by Dr. Gupta.  She requires evaluation and anesthesia risk assessment prior to undergoing surgery/procedure for treatment of left knee pain .    This patient presents today for a Preoperative exam for this procedure: LEFT Knee arthroscopy  Date of Surgery:  2018  Surgeon:  Dr. Ignacio Gupta  Facility:  [facility]  Fax:  [phone]  Phone ([facility]) [phone]  Primary Physician: Tad Ortiz  Type of Anesthesia Anticipated: to be determined    Patient has a Health Care Directive or Living Will:  NO    1. NO - Do you have a history of heart attack, stroke, stent, bypass or surgery on an artery in the head, neck, heart or legs?  2. NO - Do you ever have any pain or discomfort in your chest?  3. NO - Do you have a history of  Heart Failure?  4. NO - Are you troubled by shortness of breath when: walking on the level, up a slight hill or at night?  5. NO - Do you currently have a cold, bronchitis or other respiratory infection?  6. NO - Do you have a cough, shortness of breath or wheezing?  7. NO - Do you sometimes get pains in the calves of your legs when you walk?  8. NO - Do you or anyone in your family have previous history of blood clots?  9. NO - Do you or does anyone in your family have a serious bleeding problem such as prolonged bleeding following surgeries or cuts?  10. YES - HAVE YOU EVER HAD PROBLEMS WITH ANEMIA OR BEEN TOLD TO TAKE IRON PILLS? About 5 years ago, uncertain etiology of anemia  11. NO - Have you had any abnormal blood loss such as black, tarry or bloody stools, or abnormal vaginal bleeding?  12. YES - HAVE YOU EVER HAD A BLOOD TRANSFUSION? About 5 years ago, uncertain etiology of anemia/need for  transfusion.  13. NO - Have you or any of your relatives ever had problems with anesthesia?  14. YES - DO YOU HAVE SLEEP APNEA, EXCESSIVE SNORING OR DAYTIME DROWSINESS? Sleep apnea, uses CPAP  15. NO - Do you have any prosthetic heart valves?  16. NO - Do you have prosthetic joints?  17. NO - Is there any chance that you may be pregnant?      HPI:     HPI related to upcoming procedure: Chronic left knee pain that has worsened. She had an injection to left knee that lasted for about 2 months. Knee pain is interfering with mobility and work as she is on her feet at work. Recently had right knee surgery with relief of discomfort.       GERD: Has not had to take anything for several months.     MEDICAL HISTORY:     Patient Active Problem List    Diagnosis Date Noted     Obesity 2018     Priority: Medium     Overview:   BMI - 49       Hypovitaminosis D 2017     Priority: Medium     Old tear of medial meniscus of right knee 2017     Priority: Medium     Tubular adenoma of colon 2014     Priority: Medium     Backache 06/10/2013     Priority: Medium     Anemia 2013     Priority: Medium     Hyperlipidemia 10/01/2010     Priority: Medium      Past Medical History:   Diagnosis Date     Benign neoplasm of colon     2014,2013-repeat colonoscopy in 2018     Diverticulosis of large intestine without perforation or abscess without bleeding     3/29/2013     Gastritis without bleeding     3/29/2013     Gastro-esophageal reflux disease without esophagitis     No Comments Provided     History of colonic polyps     3/29/2013     Insomnia     No Comments Provided     Urge incontinence     2011     Past Surgical History:   Procedure Laterality Date      SECTION      x 2     COLONOSCOPY      3/29/13,Colonoscopy F/U      ESOPHAGOSCOPY, GASTROSCOPY, DUODENOSCOPY (EGD), COMBINED      3/29 13,EGD     LAPAROSCOPIC TUBAL LIGATION      No Comments Provided     Current Outpatient  "Prescriptions   Medication Sig Dispense Refill     omeprazole (PRILOSEC) 20 MG CR capsule Take 40 mg by mouth daily as needed For GI upset       OTC products: Aleve- will discontinue use tomorrow.     Allergies   Allergen Reactions     Penicillins Rash     Sulfa Drugs Rash      Latex Allergy: NO    Social History   Substance Use Topics     Smoking status: Never Smoker     Smokeless tobacco: Never Used     Alcohol use No     History   Drug Use No     Comment: Drug use: No       REVIEW OF SYSTEMS:   CONSTITUTIONAL: NEGATIVE for fever, chills, change in weight  INTEGUMENTARY/SKIN: NEGATIVE for worrisome rashes, moles or lesions  EYES: NEGATIVE for vision changes or irritation  ENT/MOUTH: NEGATIVE for ear, mouth and throat problems  RESP: NEGATIVE for significant cough or SOB  BREAST: NEGATIVE for masses, tenderness or discharge  CV: NEGATIVE for chest pain, palpitations or peripheral edema  GI: NEGATIVE for nausea, abdominal pain, heartburn, or change in bowel habits  : NEGATIVE for frequency, dysuria, or hematuria  MUSCULOSKELETAL: POSITIVE for left knee pain  NEURO: NEGATIVE for weakness, dizziness or paresthesias  ENDOCRINE: NEGATIVE for temperature intolerance, skin/hair changes  HEME: NEGATIVE for bleeding problems  PSYCHIATRIC: NEGATIVE for changes in mood or affect    EXAM:   /80  Pulse 82  Temp 97.8  F (36.6  C) (Tympanic)  Ht 5' 1.75\" (1.568 m)  Wt 272 lb (123.4 kg)  LMP 05/25/2018 (Exact Date)  SpO2 96%  Breastfeeding? No  BMI 50.15 kg/m2       GENERAL APPEARANCE: healthy, alert and no distress     EYES: EOMI, PERRL     HENT: ear canals and TM's normal and nose and mouth without ulcers or lesions     NECK: no adenopathy, no asymmetry, masses, or scars and thyroid normal to palpation     RESP: lungs clear to auscultation - no rales, rhonchi or wheezes     CV: regular rates and rhythm, normal S1 S2, no S3 or S4 and no murmur, click or rub     MS: extremities normal- no gross deformities noted, " no evidence of inflammation in joints, FROM in all extremities. Limping gait.     SKIN: no suspicious lesions or rashes     NEURO: Normal strength and tone, sensory exam grossly normal, mentation intact and speech normal     PSYCH: mentation appears normal. and affect normal/bright     LYMPHATICS: No cervical adenopathy    DIAGNOSTICS:     EKG: Done on 08/02/2018- Sinus rhythm with ventricular rate of 68.  Able to perform at least 4 METs of exercise without PINO, chest pain, claudication    Labs Resulted Today:   Results for orders placed or performed in visit on 11/28/18   Basic Metabolic Panel   Result Value Ref Range    Sodium 141 134 - 144 mmol/L    Potassium 4.4 3.5 - 5.1 mmol/L    Chloride 104 98 - 107 mmol/L    Carbon Dioxide 29 21 - 31 mmol/L    Anion Gap 8 3 - 14 mmol/L    Glucose 88 70 - 105 mg/dL    Urea Nitrogen 22 7 - 25 mg/dL    Creatinine 0.65 0.60 - 1.20 mg/dL    GFR Estimate >90 >60 mL/min/1.7m2    GFR Estimate If Black >90 >60 mL/min/1.7m2    Calcium 9.3 8.6 - 10.3 mg/dL   CBC W PLT No Diff   Result Value Ref Range    WBC 8.0 4.0 - 11.0 10e9/L    RBC Count 4.67 3.8 - 5.2 10e12/L    Hemoglobin 13.5 11.7 - 15.7 g/dL    Hematocrit 41.5 35.0 - 47.0 %    MCV 89 78 - 100 fl    MCH 28.9 26.5 - 33.0 pg    MCHC 32.5 31.5 - 36.5 g/dL    RDW 13.2 10.0 - 15.0 %    Platelet Count 258 150 - 450 10e9/L       Recent Labs   Lab Test  08/02/18   1357  12/13/17   1440  12/13/17   1427   03/25/13   0827   HGB  13.5   --   13.3   < >   --    PLT  267   --   256   < >   --    INR   --    --    --    --   1.2   NA  141  138   --    < >   --    POTASSIUM  4.0  3.6   --    < >   --    CR  0.83  0.73   --    < >   --     < > = values in this interval not displayed.        IMPRESSION:   Reason for surgery/procedure: Left knee pain  Diagnosis/reason for consult: preoperative physical examination    The proposed surgical procedure is considered INTERMEDIATE risk.    REVISED CARDIAC RISK INDEX  The patient has the following  serious cardiovascular risks for perioperative complications such as (MI, PE, VFib and 3  AV Block):  No serious cardiac risks  INTERPRETATION: 0 risks: Class I (very low risk - 0.4% complication rate)    The patient has the following additional risks for perioperative complications:  Sleep apnea      ICD-10-CM    1. Preop general physical exam Z01.818        RECOMMENDATIONS:     1. Preop general physical exam  Routine, normal physical examination  - Basic Metabolic Panel; Future  - CBC W PLT No Diff; Future    2. Arthritis of left knee  Chronic  - Proceed with surgery  - Discontinue use of NASAID (ibuprofen (Advil), naproxen (Alve)) 7 days prior to procedure. It is okay to use acetaminophen (Tylenol) 1,000 mg four times daily for discomfort.       Obstructive Sleep Apnea (or suspected sleep apnea)  Discussed bringing CPAP with her and continue using after discharge to home while sleeping.    --Patient is on no chronic medications    APPROVAL GIVEN to proceed with proposed procedure, without further diagnostic evaluation       Signed Electronically by: Ewa Kingston CNP    Copy of this evaluation report is provided to requesting physician.    [facility] Preop Guidelines    Revised Cardiac Risk Index

## 2018-11-28 NOTE — NURSING NOTE
This patient presents today for a Preoperative exam for this procedure: LEFT Knee scope  Date of Surgery:  12/06/2018  Surgeon:  Dr. Ignacio Gupta  Facility:  [facility]  Fax:  [phone]  Phone ([facility]) [phone]  Yary Bacon CMA..............11/28/2018........1:02 PM

## 2018-11-28 NOTE — Clinical Note
Date of Surgery:  12/06/2018 Surgeon:  Dr. Ignacio Gupta Facility:  [facility] Fax:  [phone] Phone ([facility]) [phone] Primary Physician: Tad Ortiz

## 2019-01-03 ENCOUNTER — HOSPITAL ENCOUNTER (OUTPATIENT)
Dept: PHYSICAL THERAPY | Facility: OTHER | Age: 54
Setting detail: THERAPIES SERIES
End: 2019-01-03
Attending: ORTHOPAEDIC SURGERY
Payer: COMMERCIAL

## 2019-01-03 DIAGNOSIS — M25.562 LEFT KNEE PAIN: ICD-10-CM

## 2019-01-03 PROCEDURE — 97016 VASOPNEUMATIC DEVICE THERAPY: CPT | Mod: GP

## 2019-01-03 PROCEDURE — 97161 PT EVAL LOW COMPLEX 20 MIN: CPT | Mod: GP

## 2019-01-03 PROCEDURE — 97110 THERAPEUTIC EXERCISES: CPT | Mod: GP

## 2019-01-04 NOTE — PROGRESS NOTES
01/03/19 1300   General Information   Type of Visit Initial OP Ortho PT Evaluation   Start of Care Date 01/03/19   Referring Physician Dr. Gupta   Patient/Family Goals Statement To reduce her pain and improve her mobility   Orders Evaluate and Treat   Orders Comment Left knee scope with medial meniscal debridement, lateral meniscal debridement & extensive dbmt done on 12/6/18, Physical Therapy eval & treat - 2-3 times per week, 6-8 weeks   Date of Order 12/19/18   Insurance Type Other   Insurance Comments/Visits Authorized [employer]   Medical Diagnosis Left knee pain M25.562    Surgical/Medical history reviewed Yes   Precautions/Limitations no known precautions/limitations   General Information Comments Patient is a 53 year old female referred to physical therapy after a left knee surgery. She states that she had surgery on her right knee back in August of this year and had surgery on her left knee in 12/6/2018. She states that since this time her knee pain has improved and her motion has gotten better. She does report stiffness and soreness in her knee but that improves with rest. She continues to ice every night. Denies any falls or issues with her balance since the surgery. 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 Previously, she was a  for [employer]. Has a follow up with the surgeon on 1/14/2019   Body Part(s)   Body Part(s) Knee   Presentation and Etiology   Pertinent history of current problem (include personal factors and/or comorbidities that impact the POC) Previous knee surgeries   Impairments A. Pain;C. Swelling;E. Decreased flexibility;D. Decreased ROM   Functional Limitations perform activities of daily living;perform required work activities;perform desired leisure / sports activities   Symptom Location Left knee   How/Where did it occur From insidious onset   Onset date of current episode/exacerbation 12/06/18   Chronicity New   Pain rating (0-10 point scale) Best  (/10);Worst (/10)   Best (/10) 4   Worst (/10) 6   Pain quality B. Dull   Frequency of pain/symptoms B. Intermittent   Pain/symptoms are: Worse during the day   Pain/symptoms exacerbated by B. Walking   Pain/symptoms eased by C. Rest;H. Cold   Progression of symptoms since onset: Improved   Prior Level of Function   Prior Level of Function-Mobility Independent   Prior Level of Function-ADLs Independent   Current Level of Function   Patient role/employment history A. Employed   Employment Comments  at [employer]   Current equipment-Gait/Locomotion None   Current equipment-ADL None   Fall Risk Screen   Fall screen completed by PT   Have you fallen 2 or more times in the past year? No   Have you fallen and had an injury in the past year? No   Is patient a fall risk? No   Knee Objective Findings   Side (if bilateral, select both right and left) Left   Observation Antalgic gait pattern, sits with left knee extended   Integumentary  No significant findings   Posture Rounded shoulders   Gait/Locomotion Antalgic gait pattern to get weight of left LE during stance phase, slower aziza    Knee/Hip Strength Comments Hip flexion: 4+/5   Anterior Drawer Test N/a   Posterior Drawer Test N/a   Varus Stress Test N/a   Valgus Stress Test N/a   Heaven's Test N/a   Left Knee Extension AROM 4 degrees short of full extension   Left Knee Flexion AROM 86 degrees   Left Knee Flexion PROM 94 degrees   Left Hip Abduction Strength 5/5   Left Hamstring Flexibility No major stiffness noted   Planned Therapy Interventions   Planned Therapy Interventions gait training;joint mobilization;manual therapy;neuromuscular re-education;ROM;strengthening;stretching   Planned Modality Interventions   Planned Modality Interventions Cryotherapy;Hot packs;Ultrasound   Clinical Impression   Criteria for Skilled Therapeutic Interventions Met yes, treatment indicated   PT Diagnosis Impaired mobility, decreased strength  and activity tolerance   Influenced by the following impairments Pain, stiffness   Functional limitations due to impairments Prolonged walking, sit<>stand, house chores, stairs   Clinical Presentation Stable/Uncomplicated   Clinical Presentation Rationale Minimal reported comorbidities   Clinical Decision Making (Complexity) Low complexity   Therapy Frequency other (see comments)  (1-3 times per week)   Predicted Duration of Therapy Intervention (days/wks) 8 weeks   Risk & Benefits of therapy have been explained Yes   Patient, Family & other staff in agreement with plan of care Yes   Clinical Impression Comments Patient is a 53 year old female referred to physical therapy following left knee surgery. She states that her pain is much better since surgery but is still dealing with soreness, stiffness, and weakness. No adverse effects noted from surgery. She would benefit from physical therapy in order to reduce her pain and improve her mobility, strength and endurance   Education Assessment   Barriers to Learning No barriers   ORTHO GOALS   PT Ortho Eval Goals 1;2;3;4;5   Ortho Goal 1   Goal Identifier HEP   Goal Description Patient will demonstrate compliance and independence with her HEP in order to improve her overall function.    Target Date 02/01/19   Ortho Goal 2   Goal Identifier Ambulation   Goal Description Patient will be able to ambulate around her home with no increase in her pain in order to improve mobility around her home   Target Date 02/01/19   Ortho Goal 3   Goal Identifier Household chores   Goal Description Patient will be able to complete all house hold tasks with no increase in her pain in order to improve her function at home   Target Date 02/01/19   Ortho Goal 4   Goal Identifier Stairs   Goal Description Patient will be able to ascend/descend 1 flight of stairs with no increase in pain in order to improve her mobility around the community   Target Date 02/28/19   Ortho Goal 5   Goal Identifier  Ambulation   Goal Description Patient will be able to ambulate longer than 10 minutes with no increase in pain in order to improve her efficiency and safety with community mobility   Target Date 02/28/19   Total Evaluation Time   PT Eval, Low Complexity Minutes ([id number]) 30

## 2019-01-07 ENCOUNTER — HOSPITAL ENCOUNTER (OUTPATIENT)
Dept: PHYSICAL THERAPY | Facility: OTHER | Age: 54
Setting detail: THERAPIES SERIES
End: 2019-01-07
Attending: ORTHOPAEDIC SURGERY
Payer: COMMERCIAL

## 2019-01-07 PROCEDURE — 97110 THERAPEUTIC EXERCISES: CPT | Mod: GP

## 2019-01-07 PROCEDURE — 97016 VASOPNEUMATIC DEVICE THERAPY: CPT | Mod: GP

## 2019-01-10 ENCOUNTER — HOSPITAL ENCOUNTER (OUTPATIENT)
Dept: PHYSICAL THERAPY | Facility: OTHER | Age: 54
Setting detail: THERAPIES SERIES
End: 2019-01-10
Attending: ORTHOPAEDIC SURGERY
Payer: COMMERCIAL

## 2019-01-10 PROCEDURE — 97016 VASOPNEUMATIC DEVICE THERAPY: CPT | Mod: GP

## 2019-01-10 PROCEDURE — 97110 THERAPEUTIC EXERCISES: CPT | Mod: GP

## 2019-01-14 ENCOUNTER — HOSPITAL ENCOUNTER (OUTPATIENT)
Dept: PHYSICAL THERAPY | Facility: OTHER | Age: 54
Setting detail: THERAPIES SERIES
End: 2019-01-14
Attending: ORTHOPAEDIC SURGERY
Payer: COMMERCIAL

## 2019-01-14 PROCEDURE — 97110 THERAPEUTIC EXERCISES: CPT | Mod: GP

## 2019-01-14 PROCEDURE — 97016 VASOPNEUMATIC DEVICE THERAPY: CPT | Mod: GP

## 2019-01-17 ENCOUNTER — HOSPITAL ENCOUNTER (OUTPATIENT)
Dept: PHYSICAL THERAPY | Facility: OTHER | Age: 54
Setting detail: THERAPIES SERIES
End: 2019-01-17
Attending: ORTHOPAEDIC SURGERY
Payer: COMMERCIAL

## 2019-01-17 PROCEDURE — 97016 VASOPNEUMATIC DEVICE THERAPY: CPT | Mod: GP

## 2019-01-17 PROCEDURE — 97110 THERAPEUTIC EXERCISES: CPT | Mod: GP

## 2019-01-21 ENCOUNTER — HOSPITAL ENCOUNTER (OUTPATIENT)
Dept: PHYSICAL THERAPY | Facility: OTHER | Age: 54
Setting detail: THERAPIES SERIES
End: 2019-01-21
Attending: ORTHOPAEDIC SURGERY
Payer: COMMERCIAL

## 2019-01-21 PROCEDURE — 97016 VASOPNEUMATIC DEVICE THERAPY: CPT | Mod: GP

## 2019-01-21 PROCEDURE — 97110 THERAPEUTIC EXERCISES: CPT | Mod: GP

## 2019-01-24 ENCOUNTER — HOSPITAL ENCOUNTER (OUTPATIENT)
Dept: PHYSICAL THERAPY | Facility: OTHER | Age: 54
Setting detail: THERAPIES SERIES
End: 2019-01-24
Attending: ORTHOPAEDIC SURGERY
Payer: COMMERCIAL

## 2019-01-24 PROCEDURE — 97110 THERAPEUTIC EXERCISES: CPT | Mod: GP

## 2019-01-24 PROCEDURE — 97016 VASOPNEUMATIC DEVICE THERAPY: CPT | Mod: GP

## 2019-01-26 NOTE — PROGRESS NOTES
"Outpatient Physical Therapy Discharge Note     Patient: Michelle Du  : 1965    Beginning/End Dates of Reporting Period:  1/3/2019 to 2019    Referring Provider: Dr. Gupta    Therapy Diagnosis: Impaired mobility, decreased strength and activity tolerance     Client Self Report: Patient notes that today she is doing well however she is more sore. She has been extremely busy at work and has been moving more on her feet. Exercises at home are going well. No issues with these. Is ready for discharge at this time with the recommendation to continue her HEP    Objective Measurements:  Objective Measure: Subjective pain rating  Details: 'maybe a 2\"  Objective Measure: Knee range of motion  Details: 5-112     Outcome Measures (most recent score):  LEFS: 53/80    Goals:  Goal Identifier HEP   Goal Description Patient will demonstrate compliance and independence with her HEP in order to improve her overall function.    Target Date 19   Date Met  19   Progress:     Goal Identifier Ambulation   Goal Description Patient will be able to ambulate around her home with no increase in her pain in order to improve mobility around her home   Target Date 19   Date Met  19   Progress:     Goal Identifier Household chores   Goal Description Patient will be able to complete all house hold tasks with no increase in her pain in order to improve her function at home   Target Date 19   Date Met  19   Progress:     Goal Identifier Stairs   Goal Description Patient will be able to ascend/descend 1 flight of stairs with no increase in pain in order to improve her mobility around the community   Target Date 19   Date Met      Progress:     Goal Identifier Ambulation   Goal Description Patient will be able to ambulate longer than 10 minutes with no increase in pain in order to improve her efficiency and safety with community mobility   Target Date 19   Date Met  19   Progress: "     Progress Toward Goals:   Progress this reporting period: Patient has progressed well towards her goals. She has made significant progress as she is tolerating more standing and activity at work. She develops soreness but notes it is extremely better than when compared to before surgery.     Plan:  Discharge from therapy.    Discharge:    Reason for Discharge: Patient chooses to discontinue therapy. She is confident with her abilities to manage her symptoms and strengthen at home independently.     Equipment Issued: none    Discharge Plan: Patient to continue home program. Follow up with physician as needed

## 2019-02-15 ENCOUNTER — HEALTH MAINTENANCE LETTER (OUTPATIENT)
Age: 54
End: 2019-02-15

## 2020-03-11 ENCOUNTER — HEALTH MAINTENANCE LETTER (OUTPATIENT)
Age: 55
End: 2020-03-11

## 2020-10-13 ENCOUNTER — VIRTUAL VISIT (OUTPATIENT)
Dept: FAMILY MEDICINE | Facility: OTHER | Age: 55
End: 2020-10-13

## 2020-10-13 NOTE — PROGRESS NOTES
"Date: 10/13/2020 10:05:26  Clinician: Selena Blackwell  Clinician NPI: [id number]  Patient: Michelle Lockett  Patient : 1965  Patient Address: [address] [place] [id number]  Patient Phone: [phone]  Visit Protocol: URI  Patient Summary:  Michelle is a 55 year old ( : 1965 ) female who initiated a [facility] Visit for COVID-19 (Coronavirus) evaluation and screening. When asked the question \"Please sign me up to receive news, health information and promotions. \", Michelle responded \"Yes\".    When asked when her symptoms started, Michelle reported that she does not have any symptoms.   She denies taking antibiotic medication in the past month and having recent facial or sinus surgery in the past 60 days.    Pertinent COVID-19 (Coronavirus) information  In the past 14 days, Michelle has not worked in a congregate living setting.   She does not work or volunteer as healthcare worker or a  and does not work or volunteer in a healthcare facility.   Michelle also has not lived in a congregate living setting in the past 14 days. She does not live with a healthcare worker.   Michelle has had a close contact with a laboratory-confirmed COVID-19 patient in the last 14 days. Additional information about contact with COVID-19 (Coronavirus) patient as reported by the patient (free text):  got test back positive my work told me to get a test   Patient reported they are living in the same household with a COVID-19 positive patient.  Patient was in an enclosed space for greater than 15 minutes with a COVID-19 patient.  Since 2019, Michelle and has not had upper respiratory infection or influenza-like illness. Has not been diagnosed with lab-confirmed COVID-19 test   Pertinent medical history  Michelle does not get yeast infections when she takes antibiotics.   Michelle does not need a return to work/school note.   Weight: 247 lbs   Michelle does not smoke or use smokeless tobacco.   Weight: 247 lbs    " MEDICATIONS: No current medications, ALLERGIES: Penicillins  Clinician Response:  Dear Michelle,   Based on your exposure to COVID-19 (coronavirus), we would like to test you for this virus.  1. Please call [phone] to schedule your visit. Explain that you were referred by [facility] to have a COVID-19 test. [facility].  The following will serve as your written order for this COVID Test, ordered by me, for the indication of suspected COVID [Z20.828]: The test will be ordered in [facility], our electronic health record, after you are scheduled. It will show as ordered and authorized by Dg Barahona MD.  Order: COVID-19 (coronavirus) PCR for ASYMPTOMATIC EXPOSURE testing from [facility].  If you know you have had close contact with someone who tested positive, you should be quarantined for 14 days after this exposure. You should stay in quarantine for the14 days even if the covid test is negative, the optimal time to test after exposure is 5-7 days from the exposure  Quarantine means   What should I do?  For safety, it's very important to follow these rules. Do this for 14 days after the date you were last exposed to the virus..  Stay home and away from others. Don't go to school or anywhere else. Generally quarantine means staying home from work but there are some exceptions to this. Please contact your workplace.   No hugging, kissing or shaking hands.  Don't let anyone visit.  Cover your mouth and nose with a mask, tissue or washcloth to avoid spreading germs.  Wash your hands and face often. Use soap and water.  What are the symptoms of COVID-19?  The most common symptoms are cough, fever and trouble breathing. Less common symptoms include headache, body aches, fatigue (feeling very tired), chills, sore throat, stuffy or runny nose, diarrhea (loose poop), loss of taste or smell, belly pain, and nausea or vomiting (feeling sick to your stomach or throwing up).  After 14 days, if you have still  don't have symptoms, you likely don't have this virus.  If you develop symptoms, follow these guidelines.  If you're normally healthy: Please start another [facility] visit to report your symptoms. Go to [email or web address].  If you have a serious health problem (like cancer, heart failure, an organ transplant or kidney disease): Call your specialty clinic. Let them know that you might have COVID-19.  2. When it's time for your COVID test:  Stay at least 6 feet away from others. (If someone will drive you to your test, stay in the backseat, as far away from the  as you can.)  Cover your mouth and nose with a mask, tissue or washcloth.  Go straight to the testing site. Don't make any stops on the way there or back.  Please note  Caregivers in these groups are at risk for severe illness due to COVID-19:  o People 65 years and older  o People who live in a nursing home or long-term care facility  o People with chronic disease (lung, heart, cancer, diabetes, kidney, liver, immunologic)  o People who have a weakened immune system, including those who:  Are in cancer treatment  Take medicine that weakens the immune system, such as corticosteroids  Had a bone marrow or organ transplant  Have an immune deficiency  Have poorly controlled HIV or AIDS  Are obese (body mass index of 40 or higher)  Smoke regularly  Where can I get more information?  [facility] -- About COVID-19: [email or web address]  CDC -- What to Do If You're Sick: www.cdc.gov/coronavirus/2019-ncov/about/steps-when-sick.html  CDC -- Ending Home Isolation: www.cdc.gov/coronavirus/2019-ncov/hcp/disposition-in-home-patients.html  CDC -- Caring for Someone: www.cdc.gov/coronavirus/2019-ncov/if-you-are-sick/care-for-someone.html  [facility] -- Interim Guidance for Hospital Discharge to Home: www.health.[facility]/diseases/coronavirus/hcp/hospdischarge.pdf  [facility] clinical trials (COVID-19 research studies):  clinicalaffairs.[facility]-clinical-trials  Below are the COVID-19 hotlines at the [place] Department of [facility] ([facility]). Interpreters are available.  For health questions: Call 763-982-4669 or 1-156.299.6062 (7 a.m. to 7 p.m.)  For questions about schools and childcare: Call 047-472-9538 or 1-815.198.8513 (7 a.m. to 7 p.m.)    COVID-19 (Coronavirus) General Information  Because there is currently no vaccine to prevent infection, the best way to protect yourself is to avoid being exposed to this virus. Common symptoms of COVID-19 include but are not limited to fever, cough, and shortness of breath. These symptoms appear 2-14 days after you are exposed to the virus that causes COVID-19. Click here for more information from the CDC on how to protect yourself.  If you are sick with COVID-19 or suspect you are infected with the virus that causes COVID-19, follow the steps here from the CDC to help prevent the disease from spreading to people in your home and community.  Click here for general information from the CDC on testing.  If you develop any of these emergency warning signs for COVID-19, get medical attention immediately:     Trouble breathing    Persistent pain or pressure in the chest    New confusion or inability to arouse    Bluish lips or face      Call your doctor or clinic before going in. Call 111 if you have a medical emergency and notify the  you have or think you may have COVID-19.  For more detailed and up to date information on COVID-19 (Coronavirus), please visit the CDC website.   Diagnosis: Contact with and (suspected) exposure to other viral communicable diseases  Diagnosis ICD: Z20.828

## 2020-10-13 NOTE — PROGRESS NOTES
"Date: 10/13/2020 10:24:08  Clinician: Selena Blackwell  Clinician NPI: [id number]  Patient: Michelle Lockett  Patient : 1965  Patient Address: [address] [place] [id number]  Patient Phone: [phone]  Visit Protocol: URI  Patient Summary:  Michelle is a 55 year old ( : 1965 ) female who initiated a [facility] Visit for COVID-19 (Coronavirus) evaluation and screening. When asked the question \"Please sign me up to receive news, health information and promotions. \", Michelle responded \"Yes\".    When asked when her symptoms started, Michelle reported that she does not have any symptoms.   She denies taking antibiotic medication in the past month and having recent facial or sinus surgery in the past 60 days.    Pertinent COVID-19 (Coronavirus) information  In the past 14 days, Michelle has not worked in a congregate living setting.   She does not work or volunteer as healthcare worker or a  and does not work or volunteer in a healthcare facility.   Michelle also has not lived in a congregate living setting in the past 14 days. She does not live with a healthcare worker.   Michelle has had a close contact with a laboratory-confirmed COVID-19 patient in the last 14 days. Additional information about contact with COVID-19 (Coronavirus) patient as reported by the patient (free text): Exposed by my  who tested positive for covid he got results back Monday Oct 12  he started getting sick Oct 7 at home   Patient reported they are living in the same household with a COVID-19 positive patient.  Patient was in an enclosed space for greater than 15 minutes with a COVID-19 patient.  Since 2019, Michelle and has not had upper respiratory infection or influenza-like illness. Has not been diagnosed with lab-confirmed COVID-19 test   Pertinent medical history  Michelle does not get yeast infections when she takes antibiotics.   Michelle needs a return to work/school note.   Weight: 247 lbs   Michelle does not smoke or " use smokeless tobacco.   Weight: 247 lbs  Reason for repeat visit for the same protocol within 24 hours:   has been tested positive for covid so my job is telling me I have to be tested  See the History of referred by protocol and completed visits section for details on previous visits (visits currently in queue to be diagnosed will not appear in this section).    MEDICATIONS: No current medications, ALLERGIES: Penicillins  Clinician Response:  Dear Michelle,   Based on your exposure to COVID-19 (coronavirus), we would like to test you for this virus.  1. Please call [phone] to schedule your visit. Explain [facility]. Be ready to [facility].  The following will serve as your written order for this COVID Test, ordered by me, for the indication of suspected COVID [Z20.828]: The test will be ordered in [employer], our electronic health record, after you are scheduled. It will show as ordered and authorized by Dg Barahona MD.  Order: COVID-19 (coronavirus) PCR for ASYMPTOMATIC EXPOSURE testing from [facility].  If you know you have had close contact with someone who tested positive, you should be quarantined for 14 days after this exposure. You should stay in quarantine for the14 days even if the covid test is negative, the optimal time to test after exposure is 5-7 days from the exposure  Quarantine means   What should I do?  For safety, it's very important to follow these rules. Do this for 14 days after the date you were last exposed to the virus..  Stay home and away from others. Don't go to school or anywhere else. Generally quarantine means staying home from work but there are some exceptions to this. Please contact your workplace.   No hugging, kissing or shaking hands.  Don't let anyone visit.  Cover your mouth and nose with a mask, tissue or washcloth to avoid spreading germs.  Wash your hands and face often. Use soap and water.  What are the  symptoms of COVID-19?  The most common symptoms are cough, fever and trouble breathing. Less common symptoms include headache, body aches, fatigue (feeling very tired), chills, sore throat, stuffy or runny nose, diarrhea (loose poop), loss of taste or smell, belly pain, and nausea or vomiting (feeling sick to your stomach or throwing up).  After 14 days, if you have still don't have symptoms, you likely don't have this virus.  If you develop symptoms, follow these guidelines.  If you're normally healthy: Please start another [facility] visit to report your symptoms. Go to [email or web address].  If you have a serious health problem (like cancer, heart failure, an organ transplant or kidney disease): Call your specialty clinic. Let them know that you might have COVID-19.  2. When it's time for your COVID test:  Stay at least 6 feet away from others. (If someone will drive you to your test, stay in the backseat, as far away from the  as you can.)  Cover your mouth and nose with a mask, tissue or washcloth.  Go straight to the testing site. Don't make any stops on the way there or back.  Please note  Caregivers in these groups are at risk for severe illness due to COVID-19:  o People 65 years and older  o People who live in a nursing home or long-term care facility  o People with chronic disease (lung, heart, cancer, diabetes, kidney, liver, immunologic)  o People who have a weakened immune system, including those who:  Are in cancer treatment  Take medicine that weakens the immune system, such as corticosteroids  Had a bone marrow or organ transplant  Have an immune deficiency  Have poorly controlled HIV or AIDS  Are obese (body mass index of 40 or higher)  Smoke regularly  Where can I get more information?   [facility] -- About COVID-19: [email or web address]  CDC -- What to Do If You're Sick: www.cdc.gov/coronavirus/2019-ncov/about/steps-when-sick.html  CDC -- Ending Home Isolation:  www.cdc.gov/coronavirus/2019-ncov/hcp/disposition-in-home-patients.html  CDC -- Caring for Someone: www.cdc.gov/coronavirus/2019-ncov/if-you-are-sick/care-for-someone.html  [facility] -- Interim Guidance for Hospital Discharge to Home: www.health.[facility].[place].us/diseases/coronavirus/hcp/hospdischarge.pdf  [facility] clinical trials (COVID-19 research studies): clinicalaffairs.[facility]-clinical-trials  Below are the COVID-19 hotlines at the [place] Department of Health ([facility]). Interpreters are available.  For health questions: Call 788-151-8945 or 1-265.679.6178 (7 a.m. to 7 p.m.)  For questions about schools and childcare: Call 791-475-1393 or 1-810.705.7886 (7 a.m. to 7 p.m.)    COVID-19 (Coronavirus) General Information  Because there is currently no vaccine to prevent infection, the best way to protect yourself is to avoid being exposed to this virus. Common symptoms of COVID-19 include but are not limited to fever, cough, and shortness of breath. These symptoms appear 2-14 days after you are exposed to the virus that causes COVID-19. Click here for more information from the CDC on how to protect yourself.  If you are sick with COVID-19 or suspect you are infected with the virus that causes COVID-19, follow the steps here from the CDC to help prevent the disease from spreading to people in your home and community.  Click here for general information from the CDC on testing.  If you develop any of these emergency warning signs for COVID-19, get medical attention immediately:     Trouble breathing    Persistent pain or pressure in the chest    New confusion or inability to arouse    Bluish lips or face      Call your doctor or clinic before going in. Call [phone] if you have a medical emergency and notify the  you have or think you may have COVID-19.  For more detailed and up to date information on COVID-19 (Coronavirus), please visit the CDC website.   Diagnosis: Contact with and (suspected) exposure to  other viral communicable diseases  Diagnosis ICD: Z20.828

## 2020-10-14 ENCOUNTER — VIRTUAL VISIT (OUTPATIENT)
Dept: FAMILY MEDICINE | Facility: OTHER | Age: 55
End: 2020-10-14
Attending: PHYSICIAN ASSISTANT
Payer: COMMERCIAL

## 2020-10-14 VITALS — HEART RATE: 66 BPM | SYSTOLIC BLOOD PRESSURE: 144 MMHG | DIASTOLIC BLOOD PRESSURE: 86 MMHG

## 2020-10-14 DIAGNOSIS — Z20.822 EXPOSURE TO 2019 NOVEL CORONAVIRUS: Primary | ICD-10-CM

## 2020-10-14 PROCEDURE — 99207 PR NO CHARGE NURSE ONLY: CPT

## 2020-10-14 PROCEDURE — 99212 OFFICE O/P EST SF 10 MIN: CPT | Mod: TEL | Performed by: PHYSICIAN ASSISTANT

## 2020-10-14 PROCEDURE — U0003 INFECTIOUS AGENT DETECTION BY NUCLEIC ACID (DNA OR RNA); SEVERE ACUTE RESPIRATORY SYNDROME CORONAVIRUS 2 (SARS-COV-2) (CORONAVIRUS DISEASE [COVID-19]), AMPLIFIED PROBE TECHNIQUE, MAKING USE OF HIGH THROUGHPUT TECHNOLOGIES AS DESCRIBED BY CMS-2020-01-R: HCPCS | Mod: ZL | Performed by: PHYSICIAN ASSISTANT

## 2020-10-14 PROCEDURE — C9803 HOPD COVID-19 SPEC COLLECT: HCPCS

## 2020-10-14 ASSESSMENT — PAIN SCALES - GENERAL: PAINLEVEL: NO PAIN (0)

## 2020-10-14 NOTE — PATIENT INSTRUCTIONS
"Discharge Instructions for COVID-19 Patients  You have--or may have--COVID-19. Please follow the instructions listed below.   If you have a weakened immune system, discuss with your doctor any other actions you need to take.  How can I protect others?  If you have symptoms (fever, cough, body aches or trouble breathing):    Stay home and away from others (self-isolate) until:  ? At least 10 days have passed since your symptoms started, And   ? You've had no fever--and no medicine that reduces fever--for 1 full day (24 hours), And    ? Your other symptoms have resolved (gotten better).  If you don't show symptoms, but testing showed that you have COVID-19:    Stay home and away from others (self-isolate). Follow the tips under \"How do I self-isolate?\" below for 10 days (20 days if you have a weak immune system).    You don't need to be retested for COVID-19 before going back to school or work. As long as you're fever-free and feeling better, you can go back to school, work and other activities after waiting the 10 or 20 days.   How do I self-isolate?    Stay in your own room, even for meals. Use your own bathroom if you can.    Stay away from others in your home. No hugging, kissing or shaking hands. No visitors.    Don't go to work, school or anywhere else.    Clean \"high touch\" surfaces often (doorknobs, counters, handles). Use household cleaning spray or wipes. You'll find a full list of  on the EPA website: www.epa.gov/pesticide-registration/list-n-disinfectants-use-against-sars-cov-2.    Cover your mouth and nose with a mask or other face covering to avoid spreading germs.    Wash your hands and face often. Use soap and water.    Caregivers in these groups are at risk for severe illness due to COVID-19:  ? People 65 years and older  ? People who live in a nursing home or long-term care facility  ? People with chronic disease (lung, heart, cancer, diabetes, kidney, liver, immunologic)  ? People who have a " weakened immune system, including those who:    Are in cancer treatment    Take medicine that weakens the immune system, such as corticosteroids    Had a bone marrow or organ transplant    Have an immune deficiency    Have poorly controlled HIV or AIDS    Are obese (body mass index of 40 or higher)    Smoke regularly    Caregivers should wear gloves while washing dishes, handling laundry and cleaning bedrooms and bathrooms.    Use caution when washing and drying laundry: Don't shake dirty laundry and use the warmest water setting that you can.    For more tips on managing your health at home, go to www.cdc.gov/coronavirus/2019-ncov/downloads/10Things.pdf.  How can I take care of myself at home?  1. Get lots of rest. Drink extra fluids (unless a doctor has told you not to).    2. Take Tylenol (acetaminophen) for fever or pain. If you have liver or kidney problems, ask your family doctor if it's okay to take Tylenol.     Adults can take either:  ? 650 mg (two 325 mg pills) every 4 to 6 hours, or   ? 1,000 mg (two 500 mg pills) every 8 hours as needed.  ? Note: Don't take more than 3,000 mg in one day. Acetaminophen is found in many medicines (both prescribed and over-the-counter medicines). Read all labels to be sure you don't take too much.   For children, check the Tylenol bottle for the right dose. The dose is based on the child's age or weight.  3. If you have other health problems (like cancer, heart failure, an organ transplant or severe kidney disease): Call your specialty clinic if you don't feel better in the next 2 days.    4. Know when to call 911. Emergency warning signs include:  ? Trouble breathing or shortness of breath  ? Pain or pressure in the chest that doesn't go away  ? Feeling confused like you haven't felt before, or not being able to wake up  ? Bluish-colored lips or face    5. Your doctor may have prescribed a blood thinner medicine. Follow their instructions.  Where can I get more  information?    [facility] - About COVID-19: [email or web address]    CDC - What to Do If You're Sick: www.cdc.gov/coronavirus/2019-ncov/about/steps-when-sick.html    CDC - Ending Home Isolation: www.cdc.gov/coronavirus/2019-ncov/hcp/disposition-in-home-patients.html    CDC - Caring for Someone: www.cdc.gov/coronavirus/2019-ncov/if-you-are-sick/care-for-someone.html    [facility] - Interim Guidance for Hospital Discharge to Home: www.health.[facility].mn./diseases/coronavirus/hcp/hospdischarge.pdf    [facility] clinical trials (COVID-19 research studies): clinicalaffairs.[facility]-clinical-trials    Below are the COVID-19 hotlines at the [place] Department of Health ([facility]). Interpreters are available.  ? For health questions: Call 640-873-5766 or 1-602.813.2260 (7 a.m. to 7 p.m.)  ? For questions about schools and childcare: Call 662-370-9999 or 1-631.927.9419 (7 a.m. to 7 p.m.)    For informational purposes only. Not to replace the advice of your health care provider. Clinically reviewed by the Infection Prevention Team. Copyright   2020 [employer]. All rights reserved. [employer] 057525 - REV 08/04/20.

## 2020-10-14 NOTE — PROGRESS NOTES
"Michelle Du is a 55 year old female who is being evaluated via a billable telephone visit.      The patient has been notified of following:     \"This telephone visit will be conducted via a call between you and your physician/provider. We have found that certain health care needs can be provided without the need for a physical exam.  This service lets us provide the care you need with a short phone conversation.  If a prescription is necessary we can send it directly to your pharmacy.  If lab work is needed we can place an order for that and you can then stop by our lab to have the test done at a later time.    Telephone visits are billed at different rates depending on your insurance coverage. During this emergency period, for some insurers they may be billed the same as an in-person visit.  Please reach out to your insurance provider with any questions.    If during the course of the call the physician/provider feels a telephone visit is not appropriate, you will not be charged for this service.\"    Patient has given verbal consent for Telephone visit?  Yes    What phone number would you like to be contacted at? [phone]    How would you like to obtain your AVS? Johannahart    Subjective     Michelle Du is a 55 year old female who presents via phone visit today for the following health issues:    HPI     Patient is contacted via telephone for consideration of COVID-19 testing. Patient's  tested positive. He was tested on Sunday and received results on Monday. He is symptomatic. Patient is currently asymptomatic. Denies fever/chills, cough, sore throat, shortness of breath, wheezing, muscle or body aches, headaches, GI symptoms, rash. She is employed at [employer] and they are requiring her to be tested prior to return to work.         PAST MEDICAL HISTORY:   Past Medical History:   Diagnosis Date     Benign neoplasm of colon     6/19/2014,March 2013-repeat colonoscopy in 2018     Diverticulosis of large " intestine without perforation or abscess without bleeding     3/29/2013     Gastritis without bleeding     3/29/2013     Gastro-esophageal reflux disease without esophagitis     No Comments Provided     History of colonic polyps     3/29/2013     Insomnia     No Comments Provided     Urge incontinence     2011       PAST SURGICAL HISTORY:   Past Surgical History:   Procedure Laterality Date      SECTION      x 2     COLONOSCOPY  2013    3/29/13,Colonoscopy F/U      ESOPHAGOSCOPY, GASTROSCOPY, DUODENOSCOPY (EGD), COMBINED      3/29 13,EGD     LAPAROSCOPIC TUBAL LIGATION      No Comments Provided       FAMILY HISTORY:   Family History   Problem Relation Age of Onset     Diabetes Father         Diabetes     Hypertension Father         Hypertension     Other - See Comments Father         metastatic melanoma     Heart Disease Mother         Heart Disease,acute MI     Breast Cancer No family hx of         Cancer-breast       SOCIAL HISTORY:   Social History     Tobacco Use     Smoking status: Never Smoker     Smokeless tobacco: Never Used   Substance Use Topics     Alcohol use: No     Alcohol/week: 0.0 standard drinks        Allergies   Allergen Reactions     Penicillins Rash     Sulfa Drugs Rash     Current Outpatient Medications   Medication     omeprazole (PRILOSEC) 20 MG CR capsule     No current facility-administered medications for this visit.          Review of Systems   Constitutional, HEENT, cardiovascular, pulmonary, gi and gu systems are negative, except as otherwise noted.       Objective          Vitals:  No vitals were obtained today due to virtual visit.    healthy, alert and no distress  PSYCH: Alert and oriented times 3; coherent speech, normal   rate and volume, able to articulate logical thoughts, able   to abstract reason, no tangential thoughts, no hallucinations   or delusions  Her affect is normal  RESP: No cough, no audible wheezing, able to talk in full sentences  Remainder  of exam unable to be completed due to telephone visits            Assessment/Plan:  1. Exposure to 2019 novel coronavirus        Patient meets criteria for COVID-19 testing. They are informed to self quarantine until results are available. They have been provided information to complete curbside testing. Will notify with results. If positive, patient is to self-quarantine at home for 14 days.      Phone call duration:  5 minutes    Rea Jurado PA-C on 10/14/2020 at 8:29 AM

## 2020-10-14 NOTE — NURSING NOTE
Chief Complaint   Patient presents with     Suspected Covid     exposure   Patient presents for a telephone visit today for Covid Exposure. Patients  tested positive on Sunday was tested results yesterday. Patient currently has no symptoms.    Medication Reconciliation: completed   Selena Avitia LPN  10/14/2020 8:18 AM

## 2020-10-15 LAB
SARS-COV-2 RNA SPEC QL NAA+PROBE: ABNORMAL
SPECIMEN SOURCE: ABNORMAL

## 2020-12-27 ENCOUNTER — HEALTH MAINTENANCE LETTER (OUTPATIENT)
Age: 55
End: 2020-12-27

## 2021-01-07 ENCOUNTER — HOSPITAL ENCOUNTER (OUTPATIENT)
Dept: GENERAL RADIOLOGY | Facility: OTHER | Age: 56
End: 2021-01-07
Attending: PHYSICIAN ASSISTANT
Payer: COMMERCIAL

## 2021-01-07 ENCOUNTER — OFFICE VISIT (OUTPATIENT)
Dept: FAMILY MEDICINE | Facility: OTHER | Age: 56
End: 2021-01-07
Attending: PHYSICIAN ASSISTANT
Payer: COMMERCIAL

## 2021-01-07 VITALS
DIASTOLIC BLOOD PRESSURE: 88 MMHG | HEIGHT: 62 IN | RESPIRATION RATE: 20 BRPM | HEART RATE: 76 BPM | WEIGHT: 251 LBS | BODY MASS INDEX: 46.19 KG/M2 | TEMPERATURE: 98.8 F | SYSTOLIC BLOOD PRESSURE: 136 MMHG

## 2021-01-07 DIAGNOSIS — N95.0 POSTMENOPAUSAL BLEEDING: ICD-10-CM

## 2021-01-07 DIAGNOSIS — G89.29 CHRONIC PAIN OF BOTH KNEES: ICD-10-CM

## 2021-01-07 DIAGNOSIS — Z13.820 SCREENING FOR OSTEOPOROSIS: ICD-10-CM

## 2021-01-07 DIAGNOSIS — Z13.1 SCREENING FOR DIABETES MELLITUS: ICD-10-CM

## 2021-01-07 DIAGNOSIS — Z00.00 ROUTINE HISTORY AND PHYSICAL EXAMINATION OF ADULT: Primary | ICD-10-CM

## 2021-01-07 DIAGNOSIS — M25.562 CHRONIC PAIN OF BOTH KNEES: ICD-10-CM

## 2021-01-07 DIAGNOSIS — E55.9 HYPOVITAMINOSIS D: ICD-10-CM

## 2021-01-07 DIAGNOSIS — E66.01 MORBID OBESITY (H): ICD-10-CM

## 2021-01-07 DIAGNOSIS — E78.2 MIXED HYPERLIPIDEMIA: ICD-10-CM

## 2021-01-07 DIAGNOSIS — M25.561 CHRONIC PAIN OF BOTH KNEES: ICD-10-CM

## 2021-01-07 DIAGNOSIS — Z01.419 PAP TEST, AS PART OF ROUTINE GYNECOLOGICAL EXAMINATION: ICD-10-CM

## 2021-01-07 DIAGNOSIS — Z23 NEED FOR TDAP VACCINATION: ICD-10-CM

## 2021-01-07 LAB
ANION GAP SERPL CALCULATED.3IONS-SCNC: 7 MMOL/L (ref 3–14)
BUN SERPL-MCNC: 13 MG/DL (ref 7–25)
CALCIUM SERPL-MCNC: 9.6 MG/DL (ref 8.6–10.3)
CHLORIDE SERPL-SCNC: 105 MMOL/L (ref 98–107)
CHOLEST SERPL-MCNC: 260 MG/DL
CO2 SERPL-SCNC: 28 MMOL/L (ref 21–31)
CREAT SERPL-MCNC: 0.77 MG/DL (ref 0.6–1.2)
DEPRECATED CALCIDIOL+CALCIFEROL SERPL-MC: 15 NG/ML
GFR SERPL CREATININE-BSD FRML MDRD: 78 ML/MIN/{1.73_M2}
GLUCOSE SERPL-MCNC: 97 MG/DL (ref 70–105)
HDLC SERPL-MCNC: 59 MG/DL (ref 23–92)
LDLC SERPL CALC-MCNC: 175 MG/DL
NONHDLC SERPL-MCNC: 201 MG/DL
POTASSIUM SERPL-SCNC: 4.3 MMOL/L (ref 3.5–5.1)
SODIUM SERPL-SCNC: 140 MMOL/L (ref 134–144)
TRIGL SERPL-MCNC: 129 MG/DL

## 2021-01-07 PROCEDURE — 80048 BASIC METABOLIC PNL TOTAL CA: CPT | Mod: ZL | Performed by: PHYSICIAN ASSISTANT

## 2021-01-07 PROCEDURE — 87624 HPV HI-RISK TYP POOLED RSLT: CPT | Mod: ZL | Performed by: PHYSICIAN ASSISTANT

## 2021-01-07 PROCEDURE — 99396 PREV VISIT EST AGE 40-64: CPT | Mod: 25 | Performed by: PHYSICIAN ASSISTANT

## 2021-01-07 PROCEDURE — 82306 VITAMIN D 25 HYDROXY: CPT | Mod: ZL | Performed by: PHYSICIAN ASSISTANT

## 2021-01-07 PROCEDURE — 73564 X-RAY EXAM KNEE 4 OR MORE: CPT | Mod: RT

## 2021-01-07 PROCEDURE — 90715 TDAP VACCINE 7 YRS/> IM: CPT | Performed by: PHYSICIAN ASSISTANT

## 2021-01-07 PROCEDURE — G0123 SCREEN CERV/VAG THIN LAYER: HCPCS | Performed by: PHYSICIAN ASSISTANT

## 2021-01-07 PROCEDURE — 999N001140 HC STATISTICAL PAP TEST QC: Performed by: PHYSICIAN ASSISTANT

## 2021-01-07 PROCEDURE — 73560 X-RAY EXAM OF KNEE 1 OR 2: CPT | Mod: LT

## 2021-01-07 PROCEDURE — 36415 COLL VENOUS BLD VENIPUNCTURE: CPT | Mod: ZL | Performed by: PHYSICIAN ASSISTANT

## 2021-01-07 PROCEDURE — 90471 IMMUNIZATION ADMIN: CPT | Performed by: PHYSICIAN ASSISTANT

## 2021-01-07 PROCEDURE — 80061 LIPID PANEL: CPT | Mod: ZL | Performed by: PHYSICIAN ASSISTANT

## 2021-01-07 PROCEDURE — 88142 CYTOPATH C/V THIN LAYER: CPT | Performed by: PHYSICIAN ASSISTANT

## 2021-01-07 RX ORDER — MAGNESIUM HYDROXIDE 400 MG/5ML
SUSPENSION, ORAL (FINAL DOSE FORM) ORAL
Status: ON HOLD | COMMUNITY
Start: 2021-01-07 | End: 2021-04-06

## 2021-01-07 RX ORDER — MULTIPLE VITAMINS W/ MINERALS TAB 9MG-400MCG
1 TAB ORAL DAILY
Status: ON HOLD | COMMUNITY
Start: 2021-01-07 | End: 2021-04-06

## 2021-01-07 ASSESSMENT — MIFFLIN-ST. JEOR: SCORE: 1683.84

## 2021-01-07 NOTE — PROGRESS NOTES
Nursing Notes:   Cintia Carrillo LPN  2021  8:19 AM  Signed  Chief Complaint   Patient presents with     Physical         Medication Reconciliation: complete    Cintia Carrillo LPN        ANNUAL PHYSICAL - FEMALE    HPI: Michelle FRANCO Michelleezequiel who presents for a yearly exam.  Concerns include: Patient states that she had vaginal spotting starting on 2020 that lasted for approximately 3 days.  Previous menstrual cycle started on 2018.  No vaginal itching or discharge.  No STD concerns.  No rashes.  No urinary symptoms.  No bowel issues.  No fevers or chills.    Patient has been struggling with bilateral knee pain.  History of bilateral knee surgery.  She is having persistent knee pain.  Pain worsens with getting up.  Gets better after walking a bit.  Pain with rotating her knees.  Her left knee seems tight.  No new trauma, fall, or injury.  No swelling or bruising.  Pain in the left knee is present on the medial side.  Pain on the right knee is present on the anterior, medial, and lateral knee.  Glucosamine has helped a little with the knee pain.  She also uses Excedrin Migraine 3 tablets daily for the knee pain.    Patient's last menstrual period was 2018 (exact date).   Contraception: tubal  Risk for STI?: none  Last pap: 2017-normal, needs a repeat  Any hx of abnormal paps:  none  FH of early CA?: no  Cholesterol/DM concerns/screenin  Tobacco?: no  Calcium intake: yes  DEXA: none, needs one  Last mammo: 2018, has one scheduled  Colonoscopy: 3/29/13,Colonoscopy F/U , declines scheduling repeat at this time  Immunizations: need Tdap    Patient Active Problem List    Diagnosis Date Noted     Morbid obesity (H) 2021     Priority: Medium     Obesity 2018     Priority: Medium     Overview:   BMI - 49       Hypovitaminosis D 2017     Priority: Medium     Old tear of medial meniscus of right knee 2017     Priority: Medium     Tubular adenoma  of colon 2014     Priority: Medium     Backache 06/10/2013     Priority: Medium     Anemia 2013     Priority: Medium     Hyperlipidemia 10/01/2010     Priority: Medium       Past Medical History:   Diagnosis Date     Benign neoplasm of colon     2014,2013-repeat colonoscopy in 2018     Diverticulosis of large intestine without perforation or abscess without bleeding     3/29/2013     Gastritis without bleeding     3/29/2013     Gastro-esophageal reflux disease without esophagitis     No Comments Provided     History of colonic polyps     3/29/2013     Insomnia     No Comments Provided     Urge incontinence     2011       Past Surgical History:   Procedure Laterality Date     ARTHROSCOPY KNEE BILATERAL       carpel tunnel surgery - bilateral        SECTION      x 2     COLONOSCOPY  2013    3/29/13,Colonoscopy F/U      ESOPHAGOSCOPY, GASTROSCOPY, DUODENOSCOPY (EGD), COMBINED      3/29 13,EGD     LAPAROSCOPIC TUBAL LIGATION      No Comments Provided       Family History   Problem Relation Age of Onset     Diabetes Father         Diabetes     Hypertension Father         Hypertension     Other - See Comments Father         metastatic melanoma     Liver Cancer Father      Heart Disease Mother         Heart Disease,acute MI     Cancer Sister         uterine?     Diabetes Sister      Hypertension Sister      Hypertension Sister      Breast Cancer No family hx of         Cancer-breast       Social History     Tobacco Use     Smoking status: Never Smoker     Smokeless tobacco: Never Used   Substance Use Topics     Alcohol use: No     Alcohol/week: 0.0 standard drinks       Current Outpatient Medications   Medication Sig Dispense Refill     aspirin (ASA) 81 MG EC tablet Take 1 tablet (81 mg) by mouth daily       Glucosamine-Chondroit-Vit C-Mn (GLUCOSAMINE CHONDROITIN 500 COMPLEX) CAPS Takes 2 daily       Misc Natural Products (OSTEO BI-FLEX ADV DOUBLE ST) TABS        multivitamin  "w/minerals (THERA-VIT-M) tablet Take 1 tablet by mouth daily         Allergies   Allergen Reactions     Penicillins Rash     Sulfa Drugs Rash       REVIEW OF SYSTEMS:  Refer to HPI.    PHYSICAL EXAM:  /88 (BP Location: Right arm, Patient Position: Sitting, Cuff Size: Adult Large)   Pulse 76   Temp 98.8  F (37.1  C)   Resp 20   Ht 1.562 m (5' 1.5\")   Wt 113.9 kg (251 lb)   LMP 05/25/2018 (Exact Date)   BMI 46.66 kg/m    CONSTITUTIONAL:  Alert,cooperative, NAD.  EYES: No scleral icterus.  PERRLA.  Conjunctiva clear.  ENT/MOUTH: External ears and nose normal.  TMs normal.  Moist mucous membranes. Oropharynx clear.    ENDO: No thyromegaly or thyroidnodules.  LYMPH:  No cervical or supraclavicular LA.    BREASTS: No skin abnormalities, no erythema.  No discrete masses.  No nipple discharge, no axillary, supra- or infraclavicular LA.   CARDIOVASCULAR: Regular,S1, S2.  No S3 or S4.  No murmur/gallop/rub.  No peripheral edema.  RESPIRATORY: CTA bilaterally, no wheezes, rhonchi or rales.  GI: Bowel sounds wnl.  Soft, nontender, nondistended.  No masses or HSM.  No rebound or guarding.  : Vulva: normal, no lesions or discharge  Urethral meatus: normal size and location, no lesions or discharge  Urethra: no tenderness or masses  Bladder: no fullness or tenderness  Vagina: normal appearance, no abnormal discharge, no lesions.  No evidence of cystocele or rectocele.  Cervix: normal appearance, no lesions, no abnormal discharge, no cervical motion tenderness  Uterus: normal size and position, mobile, non-tender  Adnexa: no palpable masses bilaterally. No cervical motion tenderness.  Pap smear obtained: yes  MSKEL: Grossly normal ROM.  No clubbing.  INTEGUMENTARY:  Warm, dry.  No rash noted on exposed skin.  NEUROLOGIC: Facies symmetric.  Grossly normal movement and tone.  No tremor.  PSYCHIATRIC: Affect normal.  Speech fluent.      PHQ Depression Screen  PHQ-9 SCORE 8/5/2015 8/2/2018   PHQ-9 Total Score 2 0 "       Labs:  Results for orders placed or performed during the hospital encounter of 01/07/21   XR Knee Standing 2v  Bilateral & 2v Right     Status: None    Narrative    PROCEDURE: XR KNEE STANDING 2 V  BILATERAL AND 2 V RIGHT 1/7/2021 9:46  AM    HISTORY: Chronic pain of both knees; Chronic pain of both knees;  Chronic pain of both knees    COMPARISONS: 1/11/2018.    TECHNIQUE: 4 views.    FINDINGS: There is severe narrowing of medial joint spaces bilaterally  with small medial osteophytes. Findings have progressed slightly when  compared to the prior exam. Lateral joint spaces are fairly well  preserved. There is mild narrowing of the patellofemoral joint with  small posterior patellar osteophytes.    No fracture or dislocation is seen. There is no joint effusion. There  is a calcification along the anterior margin of the proximal tibia on  the lateral view. It is uncertain if this is a loose body or soft  tissue calcification.         Impression    IMPRESSION: Degenerative change most severe in the medial compartment.    JESSE KUMAR MD   Results for orders placed or performed in visit on 01/07/21   Basic Metabolic Panel     Status: None   Result Value Ref Range    Sodium 140 134 - 144 mmol/L    Potassium 4.3 3.5 - 5.1 mmol/L    Chloride 105 98 - 107 mmol/L    Carbon Dioxide 28 21 - 31 mmol/L    Anion Gap 7 3 - 14 mmol/L    Glucose 97 70 - 105 mg/dL    Urea Nitrogen 13 7 - 25 mg/dL    Creatinine 0.77 0.60 - 1.20 mg/dL    GFR Estimate 78 >60 mL/min/[1.73_m2]    GFR Estimate If Black >90 >60 mL/min/[1.73_m2]    Calcium 9.6 8.6 - 10.3 mg/dL   Lipid Panel     Status: Abnormal   Result Value Ref Range    Cholesterol 260 (H) <200 mg/dL    Triglycerides 129 <150 mg/dL    HDL Cholesterol 59 23 - 92 mg/dL    LDL Cholesterol Calculated 175 (H) <100 mg/dL    Non HDL Cholesterol 201 (H) <130 mg/dL   Vitamin D Total     Status: None   Result Value Ref Range    Vitamin D Total 15.0 ng/mL       ASSESSMENT AND PLAN:       ICD-10-CM    1. Routine history and physical examination of adult  Z00.00    2. Pap test, as part of routine gynecological examination  Z01.419 HPV High Risk Types DNA Cervical     Pap Screen Thin Prep with HPV - recommended age 30 - 65 years (select HPV order below)   3. Postmenopausal bleeding  N95.0 US Pelvic Complete with Transvaginal     OB/GYN REFERRAL     US Pelvic Complete with Transvaginal   4. Mixed hyperlipidemia  E78.2 Lipid Panel     Lipid Panel   5. Screening for diabetes mellitus  Z13.1 Basic Metabolic Panel     Basic Metabolic Panel   6. Need for Tdap vaccination  Z23 TDAP VACCINE (Adacel, Boostrix)  [[id number]]   7. Chronic pain of both knees  M25.561 Orthopedic & Spine  Referral    M25.562 XR Knee Standing 2v  Bilateral & 2v Right    G89.29    8. Screening for osteoporosis  Z13.820 DX Hip/Pelvis/Spine   9. Hypovitaminosis D  E55.9 Vitamin D Total     Vitamin D Total   10. Morbid obesity (H)  E66.01          Completed bilateral knee xray.  I personally reviewed the xray. I found no fracture appreciated upon initial read of xray.  Final read pending by radiology.     Knee pain:   Encouraged to take ibuprofen (400-800 mg) for relief up to 4 times per day.  Encouraged rest and elevation.  Encouraged to use ice or heat 15 minutes at a time several times per day to decrease pain. Return to clinic with any change or worsening of symptoms.   Completed xrays.   Referred to orthopedics for a consult.     Postmenopausal bleeding: Completed Pap and HPV for cervical cancer screening.  Results are pending.  Ordered pelvic ultrasound to rule out concerns.  Referred to OB/GYN for consult.    Ordered lipid panel for cholesterol monitoring.  Ordered glucose for diabetes mellitus screening.  BMP and lipid panel are pending.    Patient was given a Tdap vaccine.    Order DEXA scan for bone density screening.    Completed vitamin D for hypovitaminosis D monitoring.  Labs are pending.    Morbid obesity: Encourage  "good diet, exercise, and weight loss in order to reduce future health risk complications.    Relevant cancer screening discussed.    Counseled on healthy diet, Calcium and vitamin D intake, and exercise.    Patient Instructions   Healthy Strategies  1. Eat at least 3 meals a day and never skip breakfast.  2. Eat more slowly.  3. Decrease portion size.  4. Provide structure by using meal replacement bars or shakes, and/or low calorie frozen meals.  5. For good nutrition incorporate fruit, vegetables, whole grains, lean protein, and low-fat dairy.  6. Remove trigger foods from yourenvironment to avoid impulse eating.  7. Increase physical activity: get a pedometer and aim for 10,000 steps a day or 30-35 minutes of activity 5 days per week.  8. Weigh yourself daily or at least weekly.  9. Keep a record of what you eat and your activity.  10. Establish a support system such as afriend, group or program.    11. Read Shane Silva's \"Eat to Live\". Remember it is important to have a minimum of 1200 calories a day, okay to use olive oil, 40 grams of fiber daily. No more than two servings (the size of your palm) of red meat a week.     Please consider the following general health recommendations:    Schedule a mammogram annually starting at the age of 40 years old unless recommended earlier by your primary care provider. Come for a general exam once yearly.    Eat a quality diet (generally, low in simple sugars, starches, cholesterol and saturated fat.)    Please get 1200 mg of calcium in divided doses with 2000 units vitamin D in your diet daily. Take supplements as needed to obtain full recommended amounts.     Stay physically active. Regular walking or other exercise is one of the best ways to minimize pain of arthritis; maintain independence and mobility; maintain bone strength; maintain conditioning of your heart. Find something you enjoy and a friend to do it with you.    Maintain ideal weight. Your Body mass index is " Body mass index is 46.66 kg/m .. Generally a BMI of 20-25 is considered ideal. Overweight is defined as 25-30, obese is 30-35 and markedly obese is greater than 35.    Apply sun block (SPF 25 or greater) on exposed skin anytime you are out in the sun to prevent skin cancer.     Wear a seatbelt whenever you are in a car.    Consider a bone density study every 2-5 years to see if you are at increased risk for fracture. If you have osteoporosis, medicine to strengthen your bones can significantly reduce your risk of fracture, back pain, and loss of height.    Colonoscopy (an exam of the colon) is recommended every 10 years after the age of 50 to screen for colon cancer. (More often if you are at increased risk.)    Obtain a flu shot every fall.    You should have a tetanus booster at least once every 10 years.    A vaccine to reduce your chances of getting shingles is available if you are over 50. Information about the vaccine is available through the clinic or at:  (https://www.cdc.gov/vaccines/vpd/shingles/public/shingrix/index.html)    Check blood sugar annually. Cholesterol annually unless you have had a normal level when last checked within 5 years.     I recommend that you have a general physical exam every year. You should have a pap test every 3 years between the ages of 21 and 30 and every 3-5 years between the ages of 30 and 65 depending on your test unless you have had previous abnormal pap smears, (in these cases the exams and PAP's should be done on a schedule as recommended by your primary care provider). If you have had hysterectomy in the past, your future Pap plan may be different.     Knee pain:   Encouraged to take ibuprofen (400-800 mg) for relief up to 4 times per day.  Encouraged rest and elevation.  Encouraged to use ice or heat 15 minutes at a time several times per day to decrease pain. Return to clinic with any change or worsening of symptoms.   Completed xrays.   Referred to orthopedics for a  consult.          Cristal Naranjo PA-C PA-C..................1/7/2021 8:16 AM

## 2021-01-07 NOTE — NURSING NOTE
Chief Complaint   Patient presents with     Physical         Medication Reconciliation: complete    Cintia Carrillo, LPN

## 2021-01-07 NOTE — PATIENT INSTRUCTIONS
"Healthy Strategies  1. Eat at least 3 meals a day and never skip breakfast.  2. Eat more slowly.  3. Decrease portion size.  4. Provide structure by using meal replacement bars or shakes, and/or low calorie frozen meals.  5. For good nutrition incorporate fruit, vegetables, whole grains, lean protein, and low-fat dairy.  6. Remove trigger foods from yourenvironment to avoid impulse eating.  7. Increase physical activity: get a pedometer and aim for 10,000 steps a day or 30-35 minutes of activity 5 days per week.  8. Weigh yourself daily or at least weekly.  9. Keep a record of what you eat and your activity.  10. Establish a support system such as afriend, group or program.    11. Read Shane Silva's \"Eat to Live\". Remember it is important to have a minimum of 1200 calories a day, okay to use olive oil, 40 grams of fiber daily. No more than two servings (the size of your palm) of red meat a week.     Please consider the following general health recommendations:    Schedule a mammogram annually starting at the age of 40 years old unless recommended earlier by your primary care provider. Come for a general exam once yearly.    Eat a quality diet (generally, low in simple sugars, starches, cholesterol and saturated fat.)    Please get 1200 mg of calcium in divided doses with 2000 units vitamin D in your diet daily. Take supplements as needed to obtain full recommended amounts.     Stay physically active. Regular walking or other exercise is one of the best ways to minimize pain of arthritis; maintain independence and mobility; maintain bone strength; maintain conditioning of your heart. Find something you enjoy and a friend to do it with you.    Maintain ideal weight. Your Body mass index is Body mass index is 46.66 kg/m .. Generally a BMI of 20-25 is considered ideal. Overweight is defined as 25-30, obese is 30-35 and markedly obese is greater than 35.    Apply sun block (SPF 25 or greater) on exposed skin anytime you " pelon are out in the sun to prevent skin cancer.     Wear a seatbelt whenever you are in a car.    Consider a bone density study every 2-5 years to see if you are at increased risk for fracture. If you have osteoporosis, medicine to strengthen your bones can significantly reduce your risk of fracture, back pain, and loss of height.    Colonoscopy (an exam of the colon) is recommended every 10 years after the age of 50 to screen for colon cancer. (More often if you are at increased risk.)    Obtain a flu shot every fall.    You should have a tetanus booster at least once every 10 years.    A vaccine to reduce your chances of getting shingles is available if you are over 50. Information about the vaccine is available through the clinic or at:  (https://www.cdc.gov/vaccines/vpd/shingles/public/shingrix/index.html)    Check blood sugar annually. Cholesterol annually unless you have had a normal level when last checked within 5 years.     I recommend that you have a general physical exam every year. You should have a pap test every 3 years between the ages of 21 and 30 and every 3-5 years between the ages of 30 and 65 depending on your test unless you have had previous abnormal pap smears, (in these cases the exams and PAP's should be done on a schedule as recommended by your primary care provider). If you have had hysterectomy in the past, your future Pap plan may be different.     Knee pain:   Encouraged to take ibuprofen (400-800 mg) for relief up to 4 times per day.  Encouraged rest and elevation.  Encouraged to use ice or heat 15 minutes at a time several times per day to decrease pain. Return to clinic with any change or worsening of symptoms.   Completed xrays.   Referred to orthopedics for a consult.

## 2021-01-08 ENCOUNTER — TELEPHONE (OUTPATIENT)
Dept: FAMILY MEDICINE | Facility: OTHER | Age: 56
End: 2021-01-08

## 2021-01-08 DIAGNOSIS — E55.9 HYPOVITAMINOSIS D: Primary | ICD-10-CM

## 2021-01-08 RX ORDER — ERGOCALCIFEROL 1.25 MG/1
50000 CAPSULE, LIQUID FILLED ORAL WEEKLY
Qty: 8 CAPSULE | Refills: 0 | Status: SHIPPED | OUTPATIENT
Start: 2021-01-08 | End: 2021-02-27

## 2021-01-08 NOTE — TELEPHONE ENCOUNTER
Called and spoke to pt after confirming last name and . Told pt JRO note she stated understanding and wrote down medications.    Sheng Vee LPN .......  2021  4:00 PM

## 2021-01-08 NOTE — TELEPHONE ENCOUNTER
Your electrolytes, kidney function and blood sugar are stable.    Your cholesterol is elevated however looking at your risk factors a cholesterol medication is not warranted at this time.  I would recommend working on good diet, exercise, weight loss, and decreasing cholesterol in your diet in order to reduce your future risk of heart disease.    Your vitamin D level came back low.  I sent over a prescription for vitamin D to the pharmacy.  Please take once weekly for 8 weeks. After completion please take 1200 mg calcium and [id number] units of vitamin D daily to keep your supplies up. Please have your vitamin D level rechecked in 3-6 months.      Cristal Naranjo PA-C ..................1/8/2021 3:51 PM

## 2021-01-12 ENCOUNTER — OFFICE VISIT (OUTPATIENT)
Dept: OBGYN | Facility: OTHER | Age: 56
End: 2021-01-12
Attending: PHYSICIAN ASSISTANT
Payer: COMMERCIAL

## 2021-01-12 VITALS
WEIGHT: 254.7 LBS | SYSTOLIC BLOOD PRESSURE: 120 MMHG | HEART RATE: 72 BPM | BODY MASS INDEX: 47.35 KG/M2 | RESPIRATION RATE: 16 BRPM | DIASTOLIC BLOOD PRESSURE: 82 MMHG

## 2021-01-12 DIAGNOSIS — Z01.812 PRE-PROCEDURE LAB EXAM: ICD-10-CM

## 2021-01-12 DIAGNOSIS — N95.0 POSTMENOPAUSAL BLEEDING: Primary | ICD-10-CM

## 2021-01-12 PROCEDURE — 99205 OFFICE O/P NEW HI 60 MIN: CPT | Performed by: STUDENT IN AN ORGANIZED HEALTH CARE EDUCATION/TRAINING PROGRAM

## 2021-01-12 ASSESSMENT — PAIN SCALES - GENERAL: PAINLEVEL: NO PAIN (0)

## 2021-01-12 NOTE — NURSING NOTE
Date of Surgery: 1/27/2021  Type of Surgery: Hysteroscopy, Dilation & Curettage   Surgeon: Dr. Megan Cristobal     Patient's consents were signed and appropriate appointments were scheduled by the [facility] . Patient was given surgical folder which includes pre-operative bathing instructions related to the two packets of Hibiclens surgical prep provided. Surgical forms were copied and kept for informative purposes. Originals were delivered to Day-surgery. Patient denies questions at this time.    Patient will have pre-op with her PCP.    Janice Gonzalez RN............. 1/12/2021 2:56 PM

## 2021-01-12 NOTE — PROGRESS NOTES
Gynecology Office Visit    Chief Complaint: Postmenopausal bleeding    HPI:    Michelle Du is a 55 year old ., here for postmenopausal bleeding. She notes she had a few days of bleeding on 20. The bleeding lasted three days. She described the bleeding as almost as heavy as a period. She did not have any blood clots or pain. She denies continued bleeding and cramping. Her last menstrual period before this bleeding was in May 25, 2018. She has not had any postmenopausal bleeding before this episode.    She reports that one of her older sisters recently had postmenopausal bleeding which required a hysterectomy- the pathology result showed an early endometrial cancer.      In reviewing her other risk factors for endometrial cancer, she has a BMI of 47.  She denies any unexpected weight changes or changes in her appetite. She has not been on any hormone replacement therapy. She does not use significant soy products. She is not taking any SERM medications.     OBHx  OB History   No obstetric history on file.     2 C-sections: first baby was breech.   No previous cervical dilation in labor.    GYN history:   No history of STIs  Last pap smear: collected 21, HPV pending as well, No history of abnormal pap smears  Menses occurred monthly, they lasted approximately 5 days.    Past medical history:  Past Medical History:   Diagnosis Date     Benign neoplasm of colon     2014,2013-repeat colonoscopy in 2018     Diverticulosis of large intestine without perforation or abscess without bleeding     3/29/2013     Gastritis without bleeding     3/29/2013     Gastro-esophageal reflux disease without esophagitis     No Comments Provided     History of colonic polyps     3/29/2013     Insomnia     No Comments Provided     Urge incontinence     2011   Vitamin D deficiency  Specifically denies VTE, DM, HTN or bleeding disorders    Past Surgical History:  Past Surgical History:   Procedure  Laterality Date     ARTHROSCOPY KNEE BILATERAL       carpel tunnel surgery - bilateral        SECTION      x 2     COLONOSCOPY  2013    3/29/13,Colonoscopy F/U      ESOPHAGOSCOPY, GASTROSCOPY, DUODENOSCOPY (EGD), COMBINED      3/29 13,EGD     LAPAROSCOPIC TUBAL LIGATION      No Comments Provided       Medications:  Current Outpatient Medications   Medication     aspirin (ASA) 81 MG EC tablet     Glucosamine-Chondroit-Vit C-Mn (GLUCOSAMINE CHONDROITIN 500 COMPLEX) CAPS     Misc Natural Products (OSTEO BI-FLEX ADV DOUBLE ST) TABS     multivitamin w/minerals (THERA-VIT-M) tablet     vitamin D2 (ERGOCALCIFEROL) 67973 units (1250 mcg) capsule     No current facility-administered medications for this visit.        Allergies:       Allergies   Allergen Reactions     Penicillins Rash     Sulfa Drugs Rash       Social History:  Social History     Tobacco Use     Smoking status: Never Smoker     Smokeless tobacco: Never Used   Substance Use Topics     Alcohol use: No     Alcohol/week: 0.0 standard drinks     Drug use: No     Comment: Drug use: No   Never smoked  No marijuana use  Rare alcohol use: approximately 2 drinks/year    Family History:  Family History   Problem Relation Age of Onset     Diabetes Father         Diabetes     Hypertension Father         Hypertension     Other - See Comments Father         metastatic melanoma     Liver Cancer Father      Heart Disease Mother         Heart Disease,acute MI     Cancer Sister         uterine?     Diabetes Sister      Hypertension Sister      Hypertension Sister      Breast Cancer No family hx of         Cancer-breast   Sister (early 60s) with recent hysterectomy: removed after postmenopausal bleeding. Early stage endometrial cancer.   Specifically denies breast, ovarian, colon, pancreatic cancers  Specifically denies VTE, known familial thrombophilias and coagulopathies    ROS:   Skin: negative for rash, bruising  Eyes: negative for visual blurring, double  vision  Ears/Nose/Throat: negative for nasal congestion, vertigo  Respiratory: No shortness of breath, dyspnea on exertion, cough, or hemoptysis  Cardiovascular: negative for palpitations, chest pain, lower extremity edema and syncope or near-syncope  Gastrointestinal: negative for, nausea, vomiting and hematemesis  Genitourinary: negative for, dysuria, frequency and urgency, + vaginal bleeding for three days, no resolved.  Musculoskeletal: negative for, back pain and muscular weakness  Neurologic: negative for, headaches, syncope, seizures and local weakness  Psychiatric: negative for, anxiety, depression and hallucinations  Hematologic/Lymphatic/Immunologic: negative for, anemia, chills and fever      Physical Exam  /82 (BP Location: Right arm, Patient Position: Sitting, Cuff Size: Adult Large)   Pulse 72   Resp 16   Wt 115.5 kg (254 lb 11.2 oz)   LMP 05/25/2018 (Exact Date)   Breastfeeding No   BMI 47.35 kg/m    Gen: Well-appearing, no acute distressed, well-groomed, alert  HEENT: Normocephalic, atraumatic  Cardiovascular: Regular rate. No peripheral edema, normal peripheral circulation  Pulm: Symmetric chest rise, non-labored respirations  Abd: Soft, non-tender, non-distended  Ext: No LE edema, extremities warm and well perfused  Pelvic:  Normal appearing external female genitalia. Normal hair distribution. Vagina is without lesions. There is no vaginal discharge. Cervix nulliparous, no lesions, no cervical motion tenderness. Noted cervical stenosis. Uterus is approximately  6 cm, mobile, non-tender. No adnexal tenderness or masses    Endometrial Biopsy Note:   We discussed the risks, benefits and alternatives to the procedure and Ms. Du was agreeable to proceed with the EMB. Consents were signed.  A bimanual exam was performed to reveal an anteverted uterus.  She was assisted into a lithotomy position and a speculum was gently inserted to provide visualization of the cervix.  The cervix was  clearly visualized.  The cervix was cleaned with 3 Betadine swabs. A single-tooth tenaculum was utilized to gently grasp the anterior lip of the cervix. An ox finder was used to try to gently dilate the external cervical os. The os was firmly closed and I was unable to create a small opening. A flexible uterine sound was also attempted but unable to get through the stenotic external os. I discussed the inability to safely enter the uterus for sampling with Michelle and all instruments were gently removed. We discussed next steps to proceed with hysteroscopy D&C in the operating room.       Assessment/Plan  Michelle Du is a 55 year old  female here for concerns of postmenopausal bleeding.    # Postmenopausal bleeding  - Will schedule for Hysteroscopy D&C as we could not get an endometrial sampling in the office today due to cervical stenosis likely secondary to never having had a vaginal delivery or cervical dilation in her obstetric history and postmenopausal changes.  - Will get pre-Op clearance from her primary provider before surgery  - Will move her pelvic US up to be completed before the surgery as well    We discussed at length the recommendation to proceed with sampling of the endometrial lining in the form of either an endometrial biopsy or hysteroscopy, dilation and curettage. The first warning sign of endometrial cancer is bleeding, and any woman experiencing postmenopausal bleeding needs to be worked up to rule out cancer before another diagnosis can be made. We talked about the process of an EMB in the office, but also discussed that occasionally with the way the vagina and cervix change in menopause, the cervical os is too stenotic to be able to obtain access into the uterus in the office. We also talked about how a blind EMB can occasionally miss an endometrial cancer as it is a small regional sampling of the uterus. We discussed a planned EMB today, but I was unable to safely enter the uterus  due to cervical stenosis. Discussed that we should proceed with hysteroscopy D&C for endometrial sampling. She was in agreement with the plan outlined above.     Total time spent during this encounter including chart review, face to face, exam, procedure, surgical planning and documentation was 60 minutes.    CHAN HEADLEY MD on 1/12/2021 at 5:36 PM

## 2021-01-12 NOTE — NURSING NOTE
"Chief Complaint   Patient presents with     Consult     Postmenopausal bleeding     Patient stated she has some spotting for 1 day in August 2020 and then again December 29th 2020 that lasted for 3 days.     Initial /82 (BP Location: Right arm, Patient Position: Sitting, Cuff Size: Adult Large)   Pulse 72   Resp 16   Wt 115.5 kg (254 lb 11.2 oz)   LMP 05/25/2018 (Exact Date)   Breastfeeding No   BMI 47.35 kg/m   Estimated body mass index is 47.35 kg/m  as calculated from the following:    Height as of 1/7/21: 1.562 m (5' 1.5\").    Weight as of this encounter: 115.5 kg (254 lb 11.2 oz).  Medication Reconciliation: Completed     Doug Bhandari LPN  "

## 2021-01-14 LAB
COPATH REPORT: NORMAL
FINAL DIAGNOSIS: NORMAL
HPV HR 12 DNA CVX QL NAA+PROBE: NEGATIVE
HPV16 DNA SPEC QL NAA+PROBE: NEGATIVE
HPV18 DNA SPEC QL NAA+PROBE: NEGATIVE
PAP: NORMAL
SPECIMEN DESCRIPTION: NORMAL
SPECIMEN SOURCE CVX/VAG CYTO: NORMAL

## 2021-01-15 ENCOUNTER — HOSPITAL ENCOUNTER (OUTPATIENT)
Dept: ULTRASOUND IMAGING | Facility: OTHER | Age: 56
End: 2021-01-15
Attending: PHYSICIAN ASSISTANT
Payer: COMMERCIAL

## 2021-01-15 ENCOUNTER — HOSPITAL ENCOUNTER (OUTPATIENT)
Dept: MAMMOGRAPHY | Facility: OTHER | Age: 56
End: 2021-01-15
Attending: FAMILY MEDICINE
Payer: COMMERCIAL

## 2021-01-15 DIAGNOSIS — Z12.31 VISIT FOR SCREENING MAMMOGRAM: ICD-10-CM

## 2021-01-15 PROCEDURE — 77063 BREAST TOMOSYNTHESIS BI: CPT

## 2021-01-15 PROCEDURE — 76830 TRANSVAGINAL US NON-OB: CPT

## 2021-01-20 ENCOUNTER — OFFICE VISIT (OUTPATIENT)
Dept: FAMILY MEDICINE | Facility: OTHER | Age: 56
End: 2021-01-20
Attending: PHYSICIAN ASSISTANT
Payer: COMMERCIAL

## 2021-01-20 VITALS
SYSTOLIC BLOOD PRESSURE: 138 MMHG | WEIGHT: 252.6 LBS | HEART RATE: 77 BPM | HEIGHT: 62 IN | DIASTOLIC BLOOD PRESSURE: 88 MMHG | TEMPERATURE: 98 F | OXYGEN SATURATION: 99 % | RESPIRATION RATE: 16 BRPM | BODY MASS INDEX: 46.48 KG/M2

## 2021-01-20 DIAGNOSIS — N95.0 POSTMENOPAUSAL BLEEDING: ICD-10-CM

## 2021-01-20 DIAGNOSIS — E66.01 MORBID OBESITY (H): ICD-10-CM

## 2021-01-20 DIAGNOSIS — R31.9 HEMATURIA, UNSPECIFIED TYPE: ICD-10-CM

## 2021-01-20 DIAGNOSIS — Z01.818 PREOP GENERAL PHYSICAL EXAM: Primary | ICD-10-CM

## 2021-01-20 LAB
ALBUMIN UR-MCNC: NEGATIVE MG/DL
APPEARANCE UR: CLEAR
BACTERIA #/AREA URNS HPF: ABNORMAL /HPF
BILIRUB UR QL STRIP: NEGATIVE
COLOR UR AUTO: ABNORMAL
GLUCOSE UR STRIP-MCNC: NEGATIVE MG/DL
HGB BLD-MCNC: 14.2 G/DL (ref 11.7–15.7)
HGB UR QL STRIP: ABNORMAL
INTERPRETATION ECG - MUSE: NORMAL
KETONES UR STRIP-MCNC: NEGATIVE MG/DL
LEUKOCYTE ESTERASE UR QL STRIP: NEGATIVE
MUCOUS THREADS #/AREA URNS LPF: PRESENT /LPF
NITRATE UR QL: NEGATIVE
PH UR STRIP: 6.5 PH (ref 5–7)
RBC #/AREA URNS AUTO: 4 /HPF (ref 0–2)
SOURCE: ABNORMAL
SP GR UR STRIP: 1.02 (ref 1–1.03)
SQUAMOUS #/AREA URNS AUTO: 4 /HPF (ref 0–1)
UROBILINOGEN UR STRIP-MCNC: NORMAL MG/DL (ref 0–2)
WBC #/AREA URNS AUTO: <1 /HPF (ref 0–5)

## 2021-01-20 PROCEDURE — 99214 OFFICE O/P EST MOD 30 MIN: CPT | Performed by: PHYSICIAN ASSISTANT

## 2021-01-20 PROCEDURE — 93000 ELECTROCARDIOGRAM COMPLETE: CPT | Performed by: INTERNAL MEDICINE

## 2021-01-20 PROCEDURE — 36415 COLL VENOUS BLD VENIPUNCTURE: CPT | Mod: ZL | Performed by: PHYSICIAN ASSISTANT

## 2021-01-20 PROCEDURE — 81001 URINALYSIS AUTO W/SCOPE: CPT | Mod: ZL | Performed by: PHYSICIAN ASSISTANT

## 2021-01-20 PROCEDURE — 85018 HEMOGLOBIN: CPT | Mod: ZL | Performed by: PHYSICIAN ASSISTANT

## 2021-01-20 PROCEDURE — 87086 URINE CULTURE/COLONY COUNT: CPT | Mod: ZL | Performed by: PHYSICIAN ASSISTANT

## 2021-01-20 ASSESSMENT — MIFFLIN-ST. JEOR: SCORE: 1691.1

## 2021-01-20 NOTE — PATIENT INSTRUCTIONS
Patient should take the following medications the morning of surgery with a small sip of water: hold all meds.  Patient was instructed to hold the following medications the morning of surgery: hold all meds.     Patient was advised to call our office and the surgical services with any change in condition or new symptoms if they were to develop between today and their surgical date.  Especially any cardiopulmonary symptoms or symptoms concerning for an infection.     Discontinue aspirin, aleve, naproxen and ibuprofen 7 days prior to surgery to reduce bleeding risk.  It is fine to take tylenol the week before surgery.  Hold vitamins and herbal remedies for 7 days before surgery.    Please have a [place] COVID-19 test on 1/23/2021.   Please stay in your car and call [phone] when you arrive.

## 2021-01-20 NOTE — H&P (VIEW-ONLY)
[facility]  [address]  [place] [address]  Phone: [phone]  Fax: [phone]  Primary Provider: No Ref-Primary, Physician  Pre-op Performing Provider: RITCHIE CORTES    PREOPERATIVE EVALUATION:  Today's date: 1/20/2021    Michelle Du is a 55 year old female who presents for a preoperative evaluation.    Surgical Information:  Surgery/Procedure: HYSTEROSCOPY, WITH DILATION AND CURETTAGE OF UTERUS  Surgery Location: [facility]  Surgeon: Dr. Megan Amaya  Surgery Date: 1/27/20  Time of Surgery:   Where patient plans to recover: At home with family  Fax number for surgical facility: Note does not need to be faxed, will be available electronically in Epic.    Type of Anesthesia Anticipated: to be determined    Subjective     HPI related to upcoming procedure: Patient has recently had 2 episodes of postmenopausal bleeding.  Currently scheduled for a hysteroscopy with dilation and curettage of the uterus in order to rule out concerns.  No current bleeding concerns.     She reports that one of her older sisters recently had postmenopausal bleeding which required a hysterectomy- the pathology result showed an early endometrial cancer.     Preop Questions 1/20/2021   1. Have you ever had a heart attack or stroke? No   2. Have you ever had surgery on your heart or blood vessels, such as a stent placement, a coronary artery bypass, or surgery on an artery in your head, neck, heart, or legs? No   3. Do you have chest pain with activity? No   4. Do you have a history of  heart failure? No   5. Do you currently have a cold, bronchitis or symptoms of other infection? No   6. Do you have a cough, shortness of breath, or wheezing? No   7. Do you or anyone in your family have previous history of blood clots? No   8. Do you or does anyone in your family have a serious bleeding problem such as prolonged bleeding following surgeries or cuts? No   9. Have you ever had problems with anemia or  been told to take iron pills? YES - many years ago s/p colonoscopy   10. Have you had any abnormal blood loss such as black, tarry or bloody stools, or abnormal vaginal bleeding? YES - s/p colonoscopy and history of postmenopausal bleeding   11. Have you ever had a blood transfusion? YES - s/p colonoscopy   11a. Have you ever had a transfusion reaction? No   12. Are you willing to have a blood transfusion if it is medically needed before, during, or after your surgery? Yes   13. Have you or any of your relatives ever had problems with anesthesia? No   14. Do you have sleep apnea, excessive snoring or daytime drowsiness? YES - sleep apnea - have a machine   14a. Do you have a CPAP machine? Yes   15. Do you have any artifical heart valves or other implanted medical devices like a pacemaker, defibrillator, or continuous glucose monitor? No   16. Do you have artificial joints? No   17. Are you allergic to latex? No   18. Is there any chance that you may be pregnant? No       Health Care Directive:  Patient does not have a Health Care Directive or Living Will: Discussed advance care planning with patient; information given to patient to review.    Preoperative Review of :   reviewed - no record of controlled substances prescribed.      Status of Chronic Conditions:  Morbid obesity: No acute concerns at this time.  Patient has been feeling well.  No side effects noted with previous surgery.    Patient has tolerated surgery well in the past.  Patient has no recent cough or cold symptoms.  No recent fevers, chills, sore throat, ear pain, chest pain, palpitations, problems breathing, stomachaches, nausea, vomiting, diarrhea, constipation, blood in stool or urine, dysuria, frequency, urgency.    Patient can walk up a flight of stairs without becoming short of breath or having chest pain: YES   Patient is able to tolerate greater than 4 METs of activity without any cardiopulmonary symptoms.      Review of  Systems  CONSTITUTIONAL: NEGATIVE for fever, chills, change in weight  INTEGUMENTARY/SKIN: NEGATIVE for worrisome rashes, moles or lesions  EYES: NEGATIVE for vision changes or irritation  ENT/MOUTH: NEGATIVE for ear, mouth and throat problems  RESP: NEGATIVE for significant cough or SOB  BREAST: NEGATIVE for masses, tenderness or discharge  CV: NEGATIVE for chest pain, palpitations or peripheral edema  GI: NEGATIVE for nausea, abdominal pain, heartburn, or change in bowel habits  : NEGATIVE for frequency, dysuria, or hematuria  MUSCULOSKELETAL: NEGATIVE for significant arthralgias or myalgia  NEURO: NEGATIVE for weakness, dizziness or paresthesias  ENDOCRINE: NEGATIVE for temperature intolerance, skin/hair changes  HEME: NEGATIVE for bleeding problems  PSYCHIATRIC: NEGATIVE for changes in mood or affect    Patient Active Problem List    Diagnosis Date Noted     Postmenopausal bleeding 01/12/2021     Priority: Medium     Added automatically from request for surgery [id number]       Morbid obesity (H) 01/07/2021     Priority: Medium     Obesity 02/02/2018     Priority: Medium     Overview:   BMI - 49       Hypovitaminosis D 12/14/2017     Priority: Medium     Old tear of medial meniscus of right knee 04/11/2017     Priority: Medium     Tubular adenoma of colon 06/19/2014     Priority: Medium     Backache 06/10/2013     Priority: Medium     Anemia 03/28/2013     Priority: Medium     Hyperlipidemia 10/01/2010     Priority: Medium      Past Medical History:   Diagnosis Date     Benign neoplasm of colon     6/19/2014,March 2013-repeat colonoscopy in 2018     Diverticulosis of large intestine without perforation or abscess without bleeding     3/29/2013     Gastritis without bleeding     3/29/2013     Gastro-esophageal reflux disease without esophagitis     No Comments Provided     History of colonic polyps     3/29/2013     Insomnia     No Comments Provided     Urge incontinence     12/8/2011     Past Surgical History:  "  Procedure Laterality Date     ARTHROSCOPY KNEE BILATERAL       carpel tunnel surgery - bilateral        SECTION      x 2     COLONOSCOPY  2013    3/29/13,Colonoscopy F/U 2018     ESOPHAGOSCOPY, GASTROSCOPY, DUODENOSCOPY (EGD), COMBINED      3/29 13,EGD     LAPAROSCOPIC TUBAL LIGATION      No Comments Provided     Current Outpatient Medications   Medication Sig Dispense Refill     aspirin (ASA) 81 MG EC tablet Take 1 tablet (81 mg) by mouth daily       Glucosamine-Chondroit-Vit C-Mn (GLUCOSAMINE CHONDROITIN 500 COMPLEX) CAPS Takes 2 daily       Misc Natural Products (OSTEO BI-FLEX ADV DOUBLE ST) TABS        multivitamin w/minerals (THERA-VIT-M) tablet Take 1 tablet by mouth daily       vitamin D2 (ERGOCALCIFEROL) 50290 units (1250 mcg) capsule Take 1 capsule (50,000 Units) by mouth once a week for 8 doses 8 capsule 0       Allergies   Allergen Reactions     Penicillins Rash     Sulfa Drugs Rash        Social History     Tobacco Use     Smoking status: Never Smoker     Smokeless tobacco: Never Used   Substance Use Topics     Alcohol use: No     Alcohol/week: 0.0 standard drinks     Family History   Problem Relation Age of Onset     Diabetes Father         Diabetes     Hypertension Father         Hypertension     Other - See Comments Father         metastatic melanoma     Liver Cancer Father      Heart Disease Mother         Heart Disease,acute MI     Cancer Sister         uterine?     Diabetes Sister      Hypertension Sister      Hypertension Sister      Breast Cancer No family hx of         Cancer-breast     History   Drug Use No     Comment: Drug use: No         Objective     /88 (BP Location: Right arm, Patient Position: Sitting, Cuff Size: Adult Large)   Pulse 77   Temp 98  F (36.7  C)   Resp 16   Ht 1.562 m (5' 1.5\")   Wt 114.6 kg (252 lb 9.6 oz)   LMP 2018 (Exact Date)   SpO2 99%   BMI 46.96 kg/m      Physical Exam    GENERAL APPEARANCE: healthy, alert and no distress     " EYES: EOMI, PERRL     HENT: ear canals and TM's normal and nose and mouth without ulcers or lesions     NECK: no adenopathy, no asymmetry, masses, or scars and thyroid normal to palpation     RESP: lungs clear to auscultation - no rales, rhonchi or wheezes     CV: regular rates and rhythm, normal S1 S2, no S3 or S4 and no murmur, click or rub     ABDOMEN:  soft, nontender, no HSM or masses and bowel sounds normal     MS: extremities normal- no gross deformities noted, no evidence of inflammation in joints, FROM in all extremities.     SKIN: no suspicious lesions or rashes     NEURO: Normal strength and tone, sensory exam grossly normal, mentation intact and speech normal     PSYCH: mentation appears normal. and affect normal/bright     LYMPHATICS: No cervical adenopathy    Recent Labs   Lab Test 01/07/21  0909      POTASSIUM 4.3   CR 0.77        Diagnostics:  Recent Results (from the past 168 hour(s))   UA reflex to Microscopic    Collection Time: 01/20/21  3:54 PM   Result Value Ref Range    Color Urine Light Yellow     Appearance Urine Clear     Glucose Urine Negative NEG^Negative mg/dL    Bilirubin Urine Negative NEG^Negative    Ketones Urine Negative NEG^Negative mg/dL    Specific Gravity Urine 1.020 1.003 - 1.035    Blood Urine Trace (A) NEG^Negative    pH Urine 6.5 5.0 - 7.0 pH    Protein Albumin Urine Negative NEG^Negative mg/dL    Urobilinogen mg/dL Normal 0.0 - 2.0 mg/dL    Nitrite Urine Negative NEG^Negative    Leukocyte Esterase Urine Negative NEG^Negative    Source Midstream Urine     RBC Urine 4 (H) 0 - 2 /HPF    WBC Urine <1 0 - 5 /HPF    Bacteria Urine Few (A) NEG^Negative /HPF    Squamous Epithelial /HPF Urine 4 (H) 0 - 1 /HPF    Mucous Urine Present (A) NEG^Negative /LPF   EKG 12-lead, tracing only    Collection Time: 01/20/21  4:02 PM   Result Value Ref Range    Interpretation ECG Click View Image link to view waveform and result    Hemoglobin    Collection Time: 01/20/21  4:07 PM   Result  Value Ref Range    Hemoglobin 14.2 11.7 - 15.7 g/dL      EKG: Normal Sinus Rhythm    Revised Cardiac Risk Index (RCRI):  The patient has the following serious cardiovascular risks for perioperative complications:   - No serious cardiac risks = 0 points     RCRI Interpretation: 0 points: Class I (very low risk - 0.4% complication rate)           Assessment & Plan   The proposed surgical procedure is considered INTERMEDIATE risk.    Michelle was seen today for pre-op exam.    Diagnoses and all orders for this visit:    Preop general physical exam  -     EKG 12-lead, tracing only  -     Hemoglobin; Future  -     UA reflex to Microscopic  -     Hemoglobin    Postmenopausal bleeding  -     EKG 12-lead, tracing only  -     Hemoglobin; Future  -     UA reflex to Microscopic  -     Hemoglobin    Morbid obesity (H)  -     EKG 12-lead, tracing only  -     Hemoglobin; Future  -     UA reflex to Microscopic  -     Hemoglobin    Hematuria, unspecified type  -     Urine Culture Aerobic Bacterial       Completed a hemoglobin which was stable.  Patient had a urinalysis completed which was positive for red blood cells.  Otherwise unremarkable.  Urine culture is pending in order to rule out infection concerns.  Encouraged having a repeat urinalysis completed in 1 month to ensure that the blood in the urine has resolved.  Please schedule lab only appointment in 1 month to complete a repeat urine sample.    Risks and Recommendations:  The patient has the following additional risks and recommendations for perioperative complications:   - Morbid obesity (BMI >40)  Obstructive Sleep Apnea    Medication Instructions:  Patient Instructions   Patient should take the following medications the morning of surgery with a small sip of water: hold all meds.  Patient was instructed to hold the following medications the morning of surgery: hold all meds.     Patient was advised to call our office and the surgical services with any change in condition or  new symptoms if they were to develop between today and their surgical date.  Especially any cardiopulmonary symptoms or symptoms concerning for an infection.     Discontinue aspirin, aleve, naproxen and ibuprofen 7 days prior to surgery to reduce bleeding risk.  It is fine to take tylenol the week before surgery.  Hold vitamins and herbal remedies for 7 days before surgery.    Please have a curbside COVID-19 test on 1/23/2021.   Please stay in your car and call [phone] when you arrive.         RECOMMENDATION:  APPROVAL GIVEN to proceed with proposed procedure, without further diagnostic evaluation.    Signed Electronically by: Cristal Naranjo PA-C    Copy of this evaluation report is provided to requesting physician.    Preop [facility] Preop Guidelines    Revised Cardiac Risk Index

## 2021-01-20 NOTE — PROGRESS NOTES
[facility] AND [facility]  [address]  [place] [address]  Phone: [phone]  Fax: [phone]  Primary Provider: No Ref-Primary, Physician  Pre-op Performing Provider: RITCHIE CORTES    PREOPERATIVE EVALUATION:  Today's date: 1/20/2021    Michelle Du is a 55 year old female who presents for a preoperative evaluation.    Surgical Information:  Surgery/Procedure: HYSTEROSCOPY, WITH DILATION AND CURETTAGE OF UTERUS  Surgery Location: [facility]  Surgeon: Dr. Megan Amaya  Surgery Date: 1/27/20  Time of Surgery:   Where patient plans to recover: At home with family  Fax number for surgical facility: Note does not need to be faxed, will be available electronically in Epic.    Type of Anesthesia Anticipated: to be determined    Subjective     HPI related to upcoming procedure: Patient has recently had 2 episodes of postmenopausal bleeding.  Currently scheduled for a hysteroscopy with dilation and curettage of the uterus in order to rule out concerns.  No current bleeding concerns.     She reports that one of her older sisters recently had postmenopausal bleeding which required a hysterectomy- the pathology result showed an early endometrial cancer.     Preop Questions 1/20/2021   1. Have you ever had a heart attack or stroke? No   2. Have you ever had surgery on your heart or blood vessels, such as a stent placement, a coronary artery bypass, or surgery on an artery in your head, neck, heart, or legs? No   3. Do you have chest pain with activity? No   4. Do you have a history of  heart failure? No   5. Do you currently have a cold, bronchitis or symptoms of other infection? No   6. Do you have a cough, shortness of breath, or wheezing? No   7. Do you or anyone in your family have previous history of blood clots? No   8. Do you or does anyone in your family have a serious bleeding problem such as prolonged bleeding following surgeries or cuts? No   9. Have you ever had problems with anemia or  been told to take iron pills? YES - many years ago s/p colonoscopy   10. Have you had any abnormal blood loss such as black, tarry or bloody stools, or abnormal vaginal bleeding? YES - s/p colonoscopy and history of postmenopausal bleeding   11. Have you ever had a blood transfusion? YES - s/p colonoscopy   11a. Have you ever had a transfusion reaction? No   12. Are you willing to have a blood transfusion if it is medically needed before, during, or after your surgery? Yes   13. Have you or any of your relatives ever had problems with anesthesia? No   14. Do you have sleep apnea, excessive snoring or daytime drowsiness? YES - sleep apnea - have a machine   14a. Do you have a CPAP machine? Yes   15. Do you have any artifical heart valves or other implanted medical devices like a pacemaker, defibrillator, or continuous glucose monitor? No   16. Do you have artificial joints? No   17. Are you allergic to latex? No   18. Is there any chance that you may be pregnant? No       Health Care Directive:  Patient does not have a Health Care Directive or Living Will: Discussed advance care planning with patient; information given to patient to review.    Preoperative Review of :   reviewed - no record of controlled substances prescribed.      Status of Chronic Conditions:  Morbid obesity: No acute concerns at this time.  Patient has been feeling well.  No side effects noted with previous surgery.    Patient has tolerated surgery well in the past.  Patient has no recent cough or cold symptoms.  No recent fevers, chills, sore throat, ear pain, chest pain, palpitations, problems breathing, stomachaches, nausea, vomiting, diarrhea, constipation, blood in stool or urine, dysuria, frequency, urgency.    Patient can walk up a flight of stairs without becoming short of breath or having chest pain: YES   Patient is able to tolerate greater than 4 METs of activity without any cardiopulmonary symptoms.      Review of  Systems  CONSTITUTIONAL: NEGATIVE for fever, chills, change in weight  INTEGUMENTARY/SKIN: NEGATIVE for worrisome rashes, moles or lesions  EYES: NEGATIVE for vision changes or irritation  ENT/MOUTH: NEGATIVE for ear, mouth and throat problems  RESP: NEGATIVE for significant cough or SOB  BREAST: NEGATIVE for masses, tenderness or discharge  CV: NEGATIVE for chest pain, palpitations or peripheral edema  GI: NEGATIVE for nausea, abdominal pain, heartburn, or change in bowel habits  : NEGATIVE for frequency, dysuria, or hematuria  MUSCULOSKELETAL: NEGATIVE for significant arthralgias or myalgia  NEURO: NEGATIVE for weakness, dizziness or paresthesias  ENDOCRINE: NEGATIVE for temperature intolerance, skin/hair changes  HEME: NEGATIVE for bleeding problems  PSYCHIATRIC: NEGATIVE for changes in mood or affect    Patient Active Problem List    Diagnosis Date Noted     Postmenopausal bleeding 01/12/2021     Priority: Medium     Added automatically from request for surgery [id number]       Morbid obesity (H) 01/07/2021     Priority: Medium     Obesity 02/02/2018     Priority: Medium     Overview:   BMI - 49       Hypovitaminosis D 12/14/2017     Priority: Medium     Old tear of medial meniscus of right knee 04/11/2017     Priority: Medium     Tubular adenoma of colon 06/19/2014     Priority: Medium     Backache 06/10/2013     Priority: Medium     Anemia 03/28/2013     Priority: Medium     Hyperlipidemia 10/01/2010     Priority: Medium      Past Medical History:   Diagnosis Date     Benign neoplasm of colon     6/19/2014,March 2013-repeat colonoscopy in 2018     Diverticulosis of large intestine without perforation or abscess without bleeding     3/29/2013     Gastritis without bleeding     3/29/2013     Gastro-esophageal reflux disease without esophagitis     No Comments Provided     History of colonic polyps     3/29/2013     Insomnia     No Comments Provided     Urge incontinence     12/8/2011     Past Surgical History:  "  Procedure Laterality Date     ARTHROSCOPY KNEE BILATERAL       carpel tunnel surgery - bilateral        SECTION      x 2     COLONOSCOPY  2013    3/29/13,Colonoscopy F/U 2018     ESOPHAGOSCOPY, GASTROSCOPY, DUODENOSCOPY (EGD), COMBINED      3/29 13,EGD     LAPAROSCOPIC TUBAL LIGATION      No Comments Provided     Current Outpatient Medications   Medication Sig Dispense Refill     aspirin (ASA) 81 MG EC tablet Take 1 tablet (81 mg) by mouth daily       Glucosamine-Chondroit-Vit C-Mn (GLUCOSAMINE CHONDROITIN 500 COMPLEX) CAPS Takes 2 daily       Misc Natural Products (OSTEO BI-FLEX ADV DOUBLE ST) TABS        multivitamin w/minerals (THERA-VIT-M) tablet Take 1 tablet by mouth daily       vitamin D2 (ERGOCALCIFEROL) 18152 units (1250 mcg) capsule Take 1 capsule (50,000 Units) by mouth once a week for 8 doses 8 capsule 0       Allergies   Allergen Reactions     Penicillins Rash     Sulfa Drugs Rash        Social History     Tobacco Use     Smoking status: Never Smoker     Smokeless tobacco: Never Used   Substance Use Topics     Alcohol use: No     Alcohol/week: 0.0 standard drinks     Family History   Problem Relation Age of Onset     Diabetes Father         Diabetes     Hypertension Father         Hypertension     Other - See Comments Father         metastatic melanoma     Liver Cancer Father      Heart Disease Mother         Heart Disease,acute MI     Cancer Sister         uterine?     Diabetes Sister      Hypertension Sister      Hypertension Sister      Breast Cancer No family hx of         Cancer-breast     History   Drug Use No     Comment: Drug use: No         Objective     /88 (BP Location: Right arm, Patient Position: Sitting, Cuff Size: Adult Large)   Pulse 77   Temp 98  F (36.7  C)   Resp 16   Ht 1.562 m (5' 1.5\")   Wt 114.6 kg (252 lb 9.6 oz)   LMP 2018 (Exact Date)   SpO2 99%   BMI 46.96 kg/m      Physical Exam    GENERAL APPEARANCE: healthy, alert and no distress     " EYES: EOMI, PERRL     HENT: ear canals and TM's normal and nose and mouth without ulcers or lesions     NECK: no adenopathy, no asymmetry, masses, or scars and thyroid normal to palpation     RESP: lungs clear to auscultation - no rales, rhonchi or wheezes     CV: regular rates and rhythm, normal S1 S2, no S3 or S4 and no murmur, click or rub     ABDOMEN:  soft, nontender, no HSM or masses and bowel sounds normal     MS: extremities normal- no gross deformities noted, no evidence of inflammation in joints, FROM in all extremities.     SKIN: no suspicious lesions or rashes     NEURO: Normal strength and tone, sensory exam grossly normal, mentation intact and speech normal     PSYCH: mentation appears normal. and affect normal/bright     LYMPHATICS: No cervical adenopathy    Recent Labs   Lab Test 01/07/21  0909      POTASSIUM 4.3   CR 0.77        Diagnostics:  Recent Results (from the past 168 hour(s))   UA reflex to Microscopic    Collection Time: 01/20/21  3:54 PM   Result Value Ref Range    Color Urine Light Yellow     Appearance Urine Clear     Glucose Urine Negative NEG^Negative mg/dL    Bilirubin Urine Negative NEG^Negative    Ketones Urine Negative NEG^Negative mg/dL    Specific Gravity Urine 1.020 1.003 - 1.035    Blood Urine Trace (A) NEG^Negative    pH Urine 6.5 5.0 - 7.0 pH    Protein Albumin Urine Negative NEG^Negative mg/dL    Urobilinogen mg/dL Normal 0.0 - 2.0 mg/dL    Nitrite Urine Negative NEG^Negative    Leukocyte Esterase Urine Negative NEG^Negative    Source Midstream Urine     RBC Urine 4 (H) 0 - 2 /HPF    WBC Urine <1 0 - 5 /HPF    Bacteria Urine Few (A) NEG^Negative /HPF    Squamous Epithelial /HPF Urine 4 (H) 0 - 1 /HPF    Mucous Urine Present (A) NEG^Negative /LPF   EKG 12-lead, tracing only    Collection Time: 01/20/21  4:02 PM   Result Value Ref Range    Interpretation ECG Click View Image link to view waveform and result    Hemoglobin    Collection Time: 01/20/21  4:07 PM   Result  Value Ref Range    Hemoglobin 14.2 11.7 - 15.7 g/dL      EKG: Normal Sinus Rhythm    Revised Cardiac Risk Index (RCRI):  The patient has the following serious cardiovascular risks for perioperative complications:   - No serious cardiac risks = 0 points     RCRI Interpretation: 0 points: Class I (very low risk - 0.4% complication rate)           Assessment & Plan   The proposed surgical procedure is considered INTERMEDIATE risk.    Michelle was seen today for pre-op exam.    Diagnoses and all orders for this visit:    Preop general physical exam  -     EKG 12-lead, tracing only  -     Hemoglobin; Future  -     UA reflex to Microscopic  -     Hemoglobin    Postmenopausal bleeding  -     EKG 12-lead, tracing only  -     Hemoglobin; Future  -     UA reflex to Microscopic  -     Hemoglobin    Morbid obesity (H)  -     EKG 12-lead, tracing only  -     Hemoglobin; Future  -     UA reflex to Microscopic  -     Hemoglobin    Hematuria, unspecified type  -     Urine Culture Aerobic Bacterial       Completed a hemoglobin which was stable.  Patient had a urinalysis completed which was positive for red blood cells.  Otherwise unremarkable.  Urine culture is pending in order to rule out infection concerns.  Encouraged having a repeat urinalysis completed in 1 month to ensure that the blood in the urine has resolved.  Please schedule lab only appointment in 1 month to complete a repeat urine sample.    Risks and Recommendations:  The patient has the following additional risks and recommendations for perioperative complications:   - Morbid obesity (BMI >40)  Obstructive Sleep Apnea    Medication Instructions:  Patient Instructions   Patient should take the following medications the morning of surgery with a small sip of water: hold all meds.  Patient was instructed to hold the following medications the morning of surgery: hold all meds.     Patient was advised to call our office and the surgical services with any change in condition or  new symptoms if they were to develop between today and their surgical date.  Especially any cardiopulmonary symptoms or symptoms concerning for an infection.     Discontinue aspirin, aleve, naproxen and ibuprofen 7 days prior to surgery to reduce bleeding risk.  It is fine to take tylenol the week before surgery.  Hold vitamins and herbal remedies for 7 days before surgery.    Please have a curbside COVID-19 test on 1/23/2021.   Please stay in your car and call [phone] when you arrive.         RECOMMENDATION:  APPROVAL GIVEN to proceed with proposed procedure, without further diagnostic evaluation.    Signed Electronically by: Cristal Naranjo PA-C    Copy of this evaluation report is provided to requesting physician.    Preop [facility] Preop Guidelines    Revised Cardiac Risk Index

## 2021-01-20 NOTE — NURSING NOTE
Chief Complaint   Patient presents with     Pre-Op Exam         Medication Reconciliation: complete    Cintia Carrillo, LPN

## 2021-01-21 DIAGNOSIS — R31.21 ASYMPTOMATIC MICROSCOPIC HEMATURIA: Primary | ICD-10-CM

## 2021-01-22 LAB
BACTERIA SPEC CULT: NORMAL
SPECIMEN SOURCE: NORMAL

## 2021-01-23 ENCOUNTER — ALLIED HEALTH/NURSE VISIT (OUTPATIENT)
Dept: FAMILY MEDICINE | Facility: OTHER | Age: 56
End: 2021-01-23
Attending: STUDENT IN AN ORGANIZED HEALTH CARE EDUCATION/TRAINING PROGRAM
Payer: COMMERCIAL

## 2021-01-23 DIAGNOSIS — Z01.812 PRE-PROCEDURE LAB EXAM: ICD-10-CM

## 2021-01-23 LAB
SARS-COV-2 RNA RESP QL NAA+PROBE: NORMAL
SPECIMEN SOURCE: NORMAL

## 2021-01-23 PROCEDURE — U0005 INFEC AGEN DETEC AMPLI PROBE: HCPCS | Mod: ZL | Performed by: STUDENT IN AN ORGANIZED HEALTH CARE EDUCATION/TRAINING PROGRAM

## 2021-01-23 PROCEDURE — C9803 HOPD COVID-19 SPEC COLLECT: HCPCS

## 2021-01-23 PROCEDURE — U0003 INFECTIOUS AGENT DETECTION BY NUCLEIC ACID (DNA OR RNA); SEVERE ACUTE RESPIRATORY SYNDROME CORONAVIRUS 2 (SARS-COV-2) (CORONAVIRUS DISEASE [COVID-19]), AMPLIFIED PROBE TECHNIQUE, MAKING USE OF HIGH THROUGHPUT TECHNOLOGIES AS DESCRIBED BY CMS-2020-01-R: HCPCS | Mod: ZL | Performed by: STUDENT IN AN ORGANIZED HEALTH CARE EDUCATION/TRAINING PROGRAM

## 2021-01-24 LAB
LABORATORY COMMENT REPORT: NORMAL
SARS-COV-2 RNA RESP QL NAA+PROBE: NEGATIVE
SPECIMEN SOURCE: NORMAL

## 2021-01-26 ENCOUNTER — ANESTHESIA EVENT (OUTPATIENT)
Dept: SURGERY | Facility: OTHER | Age: 56
End: 2021-01-26
Payer: COMMERCIAL

## 2021-01-27 ENCOUNTER — ANESTHESIA (OUTPATIENT)
Dept: SURGERY | Facility: OTHER | Age: 56
End: 2021-01-27
Payer: COMMERCIAL

## 2021-01-27 ENCOUNTER — HOSPITAL ENCOUNTER (OUTPATIENT)
Facility: OTHER | Age: 56
Discharge: HOME | End: 2021-01-27
Attending: STUDENT IN AN ORGANIZED HEALTH CARE EDUCATION/TRAINING PROGRAM | Admitting: STUDENT IN AN ORGANIZED HEALTH CARE EDUCATION/TRAINING PROGRAM
Payer: COMMERCIAL

## 2021-01-27 VITALS
TEMPERATURE: 98.7 F | BODY MASS INDEX: 46.84 KG/M2 | DIASTOLIC BLOOD PRESSURE: 73 MMHG | HEART RATE: 58 BPM | WEIGHT: 252 LBS | RESPIRATION RATE: 14 BRPM | SYSTOLIC BLOOD PRESSURE: 131 MMHG | OXYGEN SATURATION: 97 %

## 2021-01-27 DIAGNOSIS — Z98.890 STATUS POST HYSTEROSCOPIC POLYPECTOMY: Primary | ICD-10-CM

## 2021-01-27 DIAGNOSIS — N95.0 POSTMENOPAUSAL BLEEDING: ICD-10-CM

## 2021-01-27 LAB
ERYTHROCYTE [DISTWIDTH] IN BLOOD BY AUTOMATED COUNT: 12.6 % (ref 10–15)
HCT VFR BLD AUTO: 41.4 % (ref 35–47)
HGB BLD-MCNC: 13.2 G/DL (ref 11.7–15.7)
MCH RBC QN AUTO: 28.4 PG (ref 26.5–33)
MCHC RBC AUTO-ENTMCNC: 31.9 G/DL (ref 31.5–36.5)
MCV RBC AUTO: 89 FL (ref 78–100)
PLATELET # BLD AUTO: 250 10E9/L (ref 150–450)
RBC # BLD AUTO: 4.65 10E12/L (ref 3.8–5.2)
WBC # BLD AUTO: 6.1 10E9/L (ref 4–11)

## 2021-01-27 PROCEDURE — 250N000011 HC RX IP 250 OP 636: Performed by: NURSE ANESTHETIST, CERTIFIED REGISTERED

## 2021-01-27 PROCEDURE — 710N000012 HC RECOVERY PHASE 2, PER MINUTE: Performed by: STUDENT IN AN ORGANIZED HEALTH CARE EDUCATION/TRAINING PROGRAM

## 2021-01-27 PROCEDURE — 272N000002 HC OR SUPPLY OTHER OPNP: Performed by: STUDENT IN AN ORGANIZED HEALTH CARE EDUCATION/TRAINING PROGRAM

## 2021-01-27 PROCEDURE — 272N000001 HC OR GENERAL SUPPLY STERILE: Performed by: STUDENT IN AN ORGANIZED HEALTH CARE EDUCATION/TRAINING PROGRAM

## 2021-01-27 PROCEDURE — 85027 COMPLETE CBC AUTOMATED: CPT | Performed by: STUDENT IN AN ORGANIZED HEALTH CARE EDUCATION/TRAINING PROGRAM

## 2021-01-27 PROCEDURE — 258N000003 HC RX IP 258 OP 636: Performed by: NURSE ANESTHETIST, CERTIFIED REGISTERED

## 2021-01-27 PROCEDURE — 258N000001 HC RX 258: Performed by: STUDENT IN AN ORGANIZED HEALTH CARE EDUCATION/TRAINING PROGRAM

## 2021-01-27 PROCEDURE — 58558 HYSTEROSCOPY BIOPSY: CPT | Performed by: STUDENT IN AN ORGANIZED HEALTH CARE EDUCATION/TRAINING PROGRAM

## 2021-01-27 PROCEDURE — 370N000017 HC ANESTHESIA TECHNICAL FEE, PER MIN: Performed by: STUDENT IN AN ORGANIZED HEALTH CARE EDUCATION/TRAINING PROGRAM

## 2021-01-27 PROCEDURE — 36415 COLL VENOUS BLD VENIPUNCTURE: CPT | Performed by: STUDENT IN AN ORGANIZED HEALTH CARE EDUCATION/TRAINING PROGRAM

## 2021-01-27 PROCEDURE — 999N000141 HC STATISTIC PRE-PROCEDURE NURSING ASSESSMENT: Performed by: STUDENT IN AN ORGANIZED HEALTH CARE EDUCATION/TRAINING PROGRAM

## 2021-01-27 PROCEDURE — 250N000011 HC RX IP 250 OP 636: Performed by: STUDENT IN AN ORGANIZED HEALTH CARE EDUCATION/TRAINING PROGRAM

## 2021-01-27 PROCEDURE — 58558 HYSTEROSCOPY BIOPSY: CPT | Performed by: NURSE ANESTHETIST, CERTIFIED REGISTERED

## 2021-01-27 PROCEDURE — 88305 TISSUE EXAM BY PATHOLOGIST: CPT

## 2021-01-27 PROCEDURE — 360N000076 HC SURGERY LEVEL 3, PER MIN: Performed by: STUDENT IN AN ORGANIZED HEALTH CARE EDUCATION/TRAINING PROGRAM

## 2021-01-27 PROCEDURE — 250N000009 HC RX 250: Performed by: NURSE ANESTHETIST, CERTIFIED REGISTERED

## 2021-01-27 RX ORDER — ONDANSETRON 2 MG/ML
4 INJECTION INTRAMUSCULAR; INTRAVENOUS EVERY 30 MIN PRN
Status: DISCONTINUED | OUTPATIENT
Start: 2021-01-27 | End: 2021-01-27 | Stop reason: HOSPADM

## 2021-01-27 RX ORDER — SODIUM CHLORIDE, SODIUM LACTATE, POTASSIUM CHLORIDE, CALCIUM CHLORIDE 600; 310; 30; 20 MG/100ML; MG/100ML; MG/100ML; MG/100ML
INJECTION, SOLUTION INTRAVENOUS CONTINUOUS
Status: DISCONTINUED | OUTPATIENT
Start: 2021-01-27 | End: 2021-01-27 | Stop reason: HOSPADM

## 2021-01-27 RX ORDER — HYDROMORPHONE HYDROCHLORIDE 1 MG/ML
.3-.5 INJECTION, SOLUTION INTRAMUSCULAR; INTRAVENOUS; SUBCUTANEOUS EVERY 10 MIN PRN
Status: DISCONTINUED | OUTPATIENT
Start: 2021-01-27 | End: 2021-01-27 | Stop reason: HOSPADM

## 2021-01-27 RX ORDER — DEXAMETHASONE SODIUM PHOSPHATE 4 MG/ML
INJECTION, SOLUTION INTRA-ARTICULAR; INTRALESIONAL; INTRAMUSCULAR; INTRAVENOUS; SOFT TISSUE PRN
Status: DISCONTINUED | OUTPATIENT
Start: 2021-01-27 | End: 2021-01-27

## 2021-01-27 RX ORDER — NALOXONE HYDROCHLORIDE 0.4 MG/ML
0.2 INJECTION, SOLUTION INTRAMUSCULAR; INTRAVENOUS; SUBCUTANEOUS
Status: DISCONTINUED | OUTPATIENT
Start: 2021-01-27 | End: 2021-01-27 | Stop reason: HOSPADM

## 2021-01-27 RX ORDER — FENTANYL CITRATE 50 UG/ML
25-50 INJECTION, SOLUTION INTRAMUSCULAR; INTRAVENOUS
Status: DISCONTINUED | OUTPATIENT
Start: 2021-01-27 | End: 2021-01-27 | Stop reason: HOSPADM

## 2021-01-27 RX ORDER — PROPOFOL 10 MG/ML
INJECTION, EMULSION INTRAVENOUS CONTINUOUS PRN
Status: DISCONTINUED | OUTPATIENT
Start: 2021-01-27 | End: 2021-01-27

## 2021-01-27 RX ORDER — KETAMINE HYDROCHLORIDE 10 MG/ML
INJECTION INTRAMUSCULAR; INTRAVENOUS PRN
Status: DISCONTINUED | OUTPATIENT
Start: 2021-01-27 | End: 2021-01-27

## 2021-01-27 RX ORDER — ONDANSETRON 4 MG/1
4 TABLET, ORALLY DISINTEGRATING ORAL EVERY 30 MIN PRN
Status: DISCONTINUED | OUTPATIENT
Start: 2021-01-27 | End: 2021-01-27 | Stop reason: HOSPADM

## 2021-01-27 RX ORDER — ONDANSETRON 2 MG/ML
INJECTION INTRAMUSCULAR; INTRAVENOUS PRN
Status: DISCONTINUED | OUTPATIENT
Start: 2021-01-27 | End: 2021-01-27

## 2021-01-27 RX ORDER — FENTANYL CITRATE 50 UG/ML
INJECTION, SOLUTION INTRAMUSCULAR; INTRAVENOUS PRN
Status: DISCONTINUED | OUTPATIENT
Start: 2021-01-27 | End: 2021-01-27

## 2021-01-27 RX ORDER — KETOROLAC TROMETHAMINE 30 MG/ML
INJECTION, SOLUTION INTRAMUSCULAR; INTRAVENOUS PRN
Status: DISCONTINUED | OUTPATIENT
Start: 2021-01-27 | End: 2021-01-27

## 2021-01-27 RX ORDER — IBUPROFEN 600 MG/1
600 TABLET, FILM COATED ORAL EVERY 6 HOURS PRN
Qty: 30 TABLET | Refills: 0 | Status: SHIPPED | OUTPATIENT
Start: 2021-01-27 | End: 2021-02-04

## 2021-01-27 RX ORDER — OXYCODONE HYDROCHLORIDE 5 MG/1
5 TABLET ORAL EVERY 6 HOURS PRN
Qty: 9 TABLET | Refills: 0 | Status: SHIPPED | OUTPATIENT
Start: 2021-01-27 | End: 2021-01-29

## 2021-01-27 RX ORDER — LIDOCAINE 40 MG/G
CREAM TOPICAL
Status: DISCONTINUED | OUTPATIENT
Start: 2021-01-27 | End: 2021-01-27 | Stop reason: HOSPADM

## 2021-01-27 RX ORDER — NALOXONE HYDROCHLORIDE 0.4 MG/ML
0.4 INJECTION, SOLUTION INTRAMUSCULAR; INTRAVENOUS; SUBCUTANEOUS
Status: DISCONTINUED | OUTPATIENT
Start: 2021-01-27 | End: 2021-01-27 | Stop reason: HOSPADM

## 2021-01-27 RX ORDER — MEPERIDINE HYDROCHLORIDE 50 MG/ML
12.5 INJECTION INTRAMUSCULAR; INTRAVENOUS; SUBCUTANEOUS
Status: DISCONTINUED | OUTPATIENT
Start: 2021-01-27 | End: 2021-01-27 | Stop reason: HOSPADM

## 2021-01-27 RX ORDER — ACETAMINOPHEN 500 MG
500-1000 TABLET ORAL EVERY 6 HOURS PRN
Qty: 30 TABLET | Refills: 0 | Status: SHIPPED | OUTPATIENT
Start: 2021-01-27 | End: 2021-02-04

## 2021-01-27 RX ORDER — BUPIVACAINE HYDROCHLORIDE 2.5 MG/ML
INJECTION, SOLUTION INFILTRATION; PERINEURAL PRN
Status: DISCONTINUED | OUTPATIENT
Start: 2021-01-27 | End: 2021-01-27 | Stop reason: HOSPADM

## 2021-01-27 RX ORDER — PROPOFOL 10 MG/ML
INJECTION, EMULSION INTRAVENOUS PRN
Status: DISCONTINUED | OUTPATIENT
Start: 2021-01-27 | End: 2021-01-27

## 2021-01-27 RX ADMIN — PROPOFOL 50 MG: 10 INJECTION, EMULSION INTRAVENOUS at 10:04

## 2021-01-27 RX ADMIN — KETOROLAC TROMETHAMINE 30 MG: 30 INJECTION, SOLUTION INTRAMUSCULAR at 10:38

## 2021-01-27 RX ADMIN — FENTANYL CITRATE 25 MCG: 50 INJECTION, SOLUTION INTRAMUSCULAR; INTRAVENOUS at 10:12

## 2021-01-27 RX ADMIN — MIDAZOLAM 2 MG: 1 INJECTION INTRAMUSCULAR; INTRAVENOUS at 09:58

## 2021-01-27 RX ADMIN — SODIUM CHLORIDE, POTASSIUM CHLORIDE, SODIUM LACTATE AND CALCIUM CHLORIDE: 600; 310; 30; 20 INJECTION, SOLUTION INTRAVENOUS at 08:43

## 2021-01-27 RX ADMIN — SODIUM CHLORIDE, POTASSIUM CHLORIDE, SODIUM LACTATE AND CALCIUM CHLORIDE: 600; 310; 30; 20 INJECTION, SOLUTION INTRAVENOUS at 10:37

## 2021-01-27 RX ADMIN — FENTANYL CITRATE 50 MCG: 50 INJECTION, SOLUTION INTRAMUSCULAR; INTRAVENOUS at 10:14

## 2021-01-27 RX ADMIN — Medication 30 MG: at 10:02

## 2021-01-27 RX ADMIN — ONDANSETRON 4 MG: 2 INJECTION INTRAMUSCULAR; INTRAVENOUS at 10:15

## 2021-01-27 RX ADMIN — DEXAMETHASONE SODIUM PHOSPHATE 4 MG: 4 INJECTION, SOLUTION INTRA-ARTICULAR; INTRALESIONAL; INTRAMUSCULAR; INTRAVENOUS; SOFT TISSUE at 10:15

## 2021-01-27 RX ADMIN — PROPOFOL 30 MG: 10 INJECTION, EMULSION INTRAVENOUS at 10:14

## 2021-01-27 RX ADMIN — FENTANYL CITRATE 50 MCG: 50 INJECTION, SOLUTION INTRAMUSCULAR; INTRAVENOUS at 10:23

## 2021-01-27 RX ADMIN — FENTANYL CITRATE 25 MCG: 50 INJECTION, SOLUTION INTRAMUSCULAR; INTRAVENOUS at 09:58

## 2021-01-27 RX ADMIN — Medication 20 MG: at 10:19

## 2021-01-27 RX ADMIN — PROPOFOL 100 MCG/KG/MIN: 10 INJECTION, EMULSION INTRAVENOUS at 10:04

## 2021-01-27 NOTE — ANESTHESIA POSTPROCEDURE EVALUATION
Patient: Michelle Du    Procedure(s):  HYSTEROSCOPY, WITH DILATION AND CURETTAGE OF UTERUS    Diagnosis:Postmenopausal bleeding [N95.0]  Diagnosis Additional Information: No value filed.    Anesthesia Type:  MAC    Note:     Postop Pain Control: Uneventful            Sign Out: Well controlled pain   PONV: No   Neuro/Psych: Uneventful            Sign Out: Acceptable/Baseline neuro status   Airway/Respiratory: Uneventful            Sign Out: Acceptable/Baseline resp. status   CV/Hemodynamics: Uneventful            Sign Out: Acceptable CV status   Other NRE: NONE   DID A NON-ROUTINE EVENT OCCUR? No         Last vitals:  Vitals:    01/27/21 1100 01/27/21 1115 01/27/21 1130   BP: 130/82 134/70 131/73   Pulse: 74 63 58   Resp:      Temp:      SpO2: 97% 97% 97%       Electronically Signed By: ROBERAT SCHILLING CRNA  January 27, 2021  12:30 PM

## 2021-01-27 NOTE — DISCHARGE INSTRUCTIONS
Use tylenol and ibuprofen alternating throughout the day for pain control as needed. Use the oxicodone for breakthrough pain as needed.  Heat packs can also help with the cramping.    Vaginal rest for 6 weeks  (no tampons, rinses, intercourse)    [facility]  Adult Discharge Orders & Instructions      For 24 hours after surgery:  1. Get plenty of rest.  A responsible adult must stay with you for at least 24 hours after you leave the hospital.   2. You may feel lightheaded.  IF so, sit for a few minutes before standing.  Have someone help you get up.   3. You may have a slight fever. Call the doctor if your fever is over 101 F (38.3 C) (taken under the tongue) or lasts longer than 24 hours.  4. You may have a dry mouth, a sore throat, muscle aches or trouble sleeping.  These should go away after 24 hours.  5. Do not make important or legal decisions.  6.   Do not drive or use heavy equipment.  If you have weakness or tingling, don't drive or use heavy equipment until this feeling goes away.                                                                                                                                                                           To contact a doctor, call    [phone]______________

## 2021-01-27 NOTE — ANESTHESIA PREPROCEDURE EVALUATION
Anesthesia Pre-Procedure Evaluation    Patient: Michelle Du   MRN: [id number] : 1965        Preoperative Diagnosis: Postmenopausal bleeding [N95.0]   Procedure : Procedure(s):  HYSTEROSCOPY, WITH DILATION AND CURETTAGE OF UTERUS     Past Medical History:   Diagnosis Date     Benign neoplasm of colon     2014,2013-repeat colonoscopy in 2018     Diverticulosis of large intestine without perforation or abscess without bleeding     3/29/2013     Gastritis without bleeding     3/29/2013     Gastro-esophageal reflux disease without esophagitis     No Comments Provided     History of colonic polyps     3/29/2013     Insomnia     No Comments Provided     Urge incontinence     2011      Past Surgical History:   Procedure Laterality Date     ARTHROSCOPY KNEE BILATERAL       carpel tunnel surgery - bilateral        SECTION      x 2     COLONOSCOPY  2013    3/29/13,Colonoscopy F/U      ESOPHAGOSCOPY, GASTROSCOPY, DUODENOSCOPY (EGD), COMBINED      3/29 13,EGD     LAPAROSCOPIC TUBAL LIGATION      No Comments Provided      Allergies   Allergen Reactions     Penicillins Rash     Sulfa Drugs Rash      Social History     Tobacco Use     Smoking status: Never Smoker     Smokeless tobacco: Never Used   Substance Use Topics     Alcohol use: No     Alcohol/week: 0.0 standard drinks      Wt Readings from Last 1 Encounters:   21 114.3 kg (252 lb)        Anesthesia Evaluation   Pt has had prior anesthetic.         ROS/MED HX  ENT/Pulmonary:     (+) sleep apnea, uses CPAP,     Neurologic:  - neg neurologic ROS     Cardiovascular:  - neg cardiovascular ROS     METS/Exercise Tolerance: 4 - Raking leaves, gardening    Hematologic:  - neg hematologic  ROS     Musculoskeletal:       GI/Hepatic:     (+) GERD, Asymptomatic on medication,     Renal/Genitourinary:  - neg Renal ROS     Endo:  - neg endo ROS   (+) Obesity,     Psychiatric/Substance Use:  - neg psychiatric ROS     Infectious Disease:  -  neg infectious disease ROS     Malignancy:  - neg malignancy ROS     Other:            Physical Exam    Airway      Comment: Recessed chin    Mallampati: II   TM distance: < 3 FB   Neck ROM: full   Mouth opening: > 3 cm    Respiratory Devices and Support         Dental  no notable dental history         Cardiovascular          Rhythm and rate: regular and normal     Pulmonary   pulmonary exam normal                OUTSIDE LABS:  CBC:   Lab Results   Component Value Date    WBC 8.0 11/28/2018    WBC 6.6 08/02/2018    HGB 14.2 01/20/2021    HGB 13.5 11/28/2018    HCT 41.5 11/28/2018    HCT 43.1 08/02/2018     11/28/2018     08/02/2018     BMP:   Lab Results   Component Value Date     01/07/2021     11/28/2018    POTASSIUM 4.3 01/07/2021    POTASSIUM 4.4 11/28/2018    CHLORIDE 105 01/07/2021    CHLORIDE 104 11/28/2018    CO2 28 01/07/2021    CO2 29 11/28/2018    BUN 13 01/07/2021    BUN 22 11/28/2018    CR 0.77 01/07/2021    CR 0.65 11/28/2018    GLC 97 01/07/2021    GLC 88 11/28/2018     COAGS:   Lab Results   Component Value Date    INR 1.2 03/25/2013     POC: No results found for: BGM, HCG, HCGS  HEPATIC:   Lab Results   Component Value Date    ALBUMIN 3.9 04/14/2017    PROTTOTAL 7.0 04/14/2017    ALKPHOS 77 04/14/2017    BILITOTAL 0.7 04/14/2017     OTHER:   Lab Results   Component Value Date    ELAN 9.6 01/07/2021       Anesthesia Plan     History & Physical Review      ASA Status:  2. NPO Status:  NPO Appropriate. .  Plan for MAC (agrees to GA if needed LMA ok)         PONV prophylaxis:  Ondansetron (or other 5HT-3) and Dexamethasone or Solumedrol.       Consents  Anesthesia Plan(s) and associated risks, benefits, and realistic alternatives discussed.    Questions answered and patient/representative(s) expressed understanding.    Discussed with:  Patient.       Extended Intubation/Ventilatory Support Discussed No No     Use of blood products discussed: No.          Postoperative  Care  Postoperative pain management: IV analgesics and Multi-modal analgesia.           ROBERTA SCHILLING CRNA

## 2021-01-27 NOTE — ANESTHESIA CARE TRANSFER NOTE
Patient: Michelle Du    Procedure(s):  HYSTEROSCOPY, WITH DILATION AND CURETTAGE OF UTERUS    Diagnosis: Postmenopausal bleeding [N95.0]  Diagnosis Additional Information: No value filed.    Anesthesia Type:   MAC     Note:    Oropharynx: oropharynx clear of all foreign objects  Level of Consciousness: drowsy  Oxygen Supplementation: nasal cannula  Level of Supplemental Oxygen: 2  Independent Airway: airway patency satisfactory and stable  Dentition: dentition unchanged  Vital Signs Stable: post-procedure vital signs reviewed and stable  Report to RN Given: handoff report given  Patient transferred to: Phase II    Handoff Report: Identifed the Patient, Identified the Reponsible Provider, Reviewed the pertinent medical history, Discussed the surgical course, Reviewed Intra-OP anesthesia mangement and issues during anesthesia, Set expectations for post-procedure period and Allowed opportunity for questions and acknowledgement of understanding      Vitals: (Last set prior to Anesthesia Care Transfer)  CRNA VITALS  1/27/2021 1019 - 1/27/2021 1119      1/27/2021             Resp Rate (set):  10        Electronically Signed By: ROBERTA Kohler CRNA  January 27, 2021  11:47 AM

## 2021-01-27 NOTE — INTERVAL H&P NOTE
I spoke with Ms. Du in our pre-operative preparation this morning and we reviewed her history and physical above with pre-operative clearance. She notes there have been no changes in her health since this visit or my visit on 1/12/21.    We discussed the procedure again and all questions were answered. Consents were signed.    CHAN HEADLEY MD on 1/27/2021 at 9:24 AM

## 2021-01-27 NOTE — OP NOTE
[facility]  Operative Note    Patient: Michelle Du  : 1965  MRN: [id number]    Date of Service: 2021    Pre-operative diagnosis:  1. Postmenopausal bleeding  2. Thickened postmenopausal endometrium on US: 14mm    Post-operative diagnosis:  1. Endometrial polyp    Procedure:   1. Hysteroscopy Dilation and Curettage  2. Myosure Polypectomy    Surgeon: Caridad Amaya MD  Assistants: Dr. Nikolai Weiner    Anesthesia: Local with MAC    EBL: 200 mL  Urine: 150 mL clear  Fluids: 1200 cystalloid  Hysteroscopic Deficit: 155 mL    Specimens: Endometrial polyp and curettings  Complications: none    Findings: EUA revealed a stenotic cervix and midline uterus, the cervix was facing posteriorly, no adnexal masses palpated.  Uterine cavity was filled with an endometrial polyp noted with an approximate 5mm stalk at the right fundus. Polyp was initially obscuring the right ostea.    Indications: Michelle Du is a 55 year old female who has been having postmenopausal bleeding. An in-office EMB was attempted, however due to cervical stenosis we were unable to get a sample during our visit. We planned for exam under anesthesia and hysteroscopy D&C with sampling in the OR. Risks, benefits, and alternatives to the procedure were discussed. The patient's questions were answered, understanding confirmed, and the patient signed written informed consent.    Technique: The patient was taken to the operating room where she was placed in the dorsal lithotomy position with feet in Ian stirrups. The patient was placed under MAC anesthesia. An exam under anesthesia was performed with finding above. The patient was prepped and draped in the usual sterile fashion. A speculum was placed in the vagina and the cervix visualized. A single-toothed tenaculum was placed on the anterior lip of the cervix at 12 o'clock. The cervical os was noted to be significantly stenotic, gentle probing with an os finder was able to  identify the correct plane of the cervical canal and the remainder of the dilation was performed with hydrodilation under direct visualization with the hysteroscope. The polyp was noted immediately and the decision was made to change from diagnostic hysteroscope to MyoSure operative scope. After appropriate set-up the Myosure was gently advanced into the uterine cavity. The operating window was advanced around the polyp and with the foot pedal, turned on. Adjustments and re-directions of the operating window allowed for complete resection of the polp. Following polyp removal, the endometrial cavity was sampled in each quadrant as curettings. The uterine cavity was inspected and both ostea were visible.  Pictures were taken. The hysteroscope apparatus was removed and total of 155 mL fluid deficit of normal saline noted.  The pathology was sent to pathology. The tenaculum was removed from the cervix and good hemostasis was noted after application of a silver nitrate stick. The speculum was removed from the vagina.    Instrument counts were correct x2. The patient was awoken in the OR and transferred to recovery in stable condition    CHAN HEADLEY MD on 1/27/2021 at 11:04 AM

## 2021-01-27 NOTE — OR NURSING
Patient has been discharged to home at 1155 via wheelchair accompanied by student nurs    Written discharge instructions were provided to patient.  Prescriptions were sent to [facility] pharmacy for . Patient states pain is tolerable and denies any nausea or dizziness upon discharge.      Patient and adult caring for them verbalize understanding of discharge instructions including no driving until tomorrow and no longer taking narcotic pain medications - no operating mechanical equipment and no making any important decisions.They understand reason for discharge, and necessary follow-up appointments.      Shonda Sauceda RN

## 2021-02-03 ENCOUNTER — OFFICE VISIT (OUTPATIENT)
Dept: ORTHOPEDICS | Facility: OTHER | Age: 56
End: 2021-02-03
Attending: PHYSICIAN ASSISTANT
Payer: COMMERCIAL

## 2021-02-03 VITALS
SYSTOLIC BLOOD PRESSURE: 120 MMHG | BODY MASS INDEX: 46.47 KG/M2 | HEART RATE: 72 BPM | WEIGHT: 250 LBS | DIASTOLIC BLOOD PRESSURE: 88 MMHG

## 2021-02-03 DIAGNOSIS — G89.29 CHRONIC PAIN OF BOTH KNEES: ICD-10-CM

## 2021-02-03 DIAGNOSIS — M25.562 CHRONIC PAIN OF BOTH KNEES: ICD-10-CM

## 2021-02-03 DIAGNOSIS — M25.561 CHRONIC PAIN OF BOTH KNEES: ICD-10-CM

## 2021-02-03 PROCEDURE — 99203 OFFICE O/P NEW LOW 30 MIN: CPT | Performed by: ORTHOPAEDIC SURGERY

## 2021-02-03 NOTE — PROGRESS NOTES
Visit Date:   02/03/2021      REASON FOR EVALUATION:  Bilateral knee pain.      HISTORY:  Michelle comes in with bilateral knee pain that has been ongoing here for months in nature here.  She has undergone prior arthroscopic intervention 2018 on the right side, in August on the left side and 12/2018 really did not give her any significant relief.  Had meniscal cleanup was done plus debridement.  She is here to look at next steps and options as far as her knees are concerned, tried injections with minimal success.  Right knee is consistently more painful than her left, pain on the medial side plus centrally in nature, worse with activity, better with rest.  Is using anti-inflammatories.  In addition to that, no other major concerns are of note at this point in time.  She is certainly interested in looking at definitive management for this at this point.      MEDICATIONS:  Reviewed.      ALLERGIES:  NOTED TO PENICILLIN AS WELL AS SULFA.      REVIEW OF SYSTEMS:  A 12-point otherwise negative with the exception as stated above.      PHYSICAL EXAMINATION:   VITAL SIGNS:  The patient is 5 feet 1 inch, 251 pounds, BMI 46, blood pressure 120/88, pulse 72.   GENERAL:  She is alert and oriented x3, cooperative with exam, in no acute distress.  Does ambulate with satisfactory gait.  Affect is appropriate here as well.  Examination of both knees shows varus deformity.  Range of motion of -5 to 120 on the right, 0 to 120, on the left.  Crepitation with flexion and extension.  Trace effusion seen both knee joints.  Quad strength 5/5 bilaterally.  Neurovascular examination otherwise intact.  No pain with Heaven testing.  Stable to varus and valgus testing bilaterally.  Minimal pain associated with hip range of motion of the bilateral lower extremity as well.      IMAGING:  X-rays have been reviewed, does show severe arthrosis, medial compartment as well as patellofemoral joint bilaterally, right side more impacted than left.       IMPRESSION:  Bilateral knee end-stage osteoarthrosis.      PLAN:  At this time, she has failed conservative management including arthroscopy, activity modifications, anti-inflammatories as well as injections.  As such, based on end-stage osteoarthrosis, advised that she does proceed forward with joint reconstruction.  We are going to look at, [employer] posterior stabilized here on the right knee to start with.  Aspirin for DVT prophylaxis, 1-2 day stay, transition home and then outpatient therapies to be set up here for her moving forward.  The patient is in agreement with that plan and will look to have intervention done here potentially in March or April pending timeframe.      Time spent on appointment here is 30 minutes.         JACI GUERRA MD             D: 2021   T: 2021   MT:       Name:     ASH GALVAN   MRN:      [id number]        Account:      [id number]   :      1965           Visit Date:   2021      Document: [id number]

## 2021-02-03 NOTE — PROGRESS NOTES
Patient is here for consult on her knee pain.  Shikha Cuellar LPN .....................2/3/2021 1:42 PM

## 2021-02-04 ENCOUNTER — OFFICE VISIT (OUTPATIENT)
Dept: OBGYN | Facility: OTHER | Age: 56
End: 2021-02-04
Attending: STUDENT IN AN ORGANIZED HEALTH CARE EDUCATION/TRAINING PROGRAM
Payer: COMMERCIAL

## 2021-02-04 VITALS
SYSTOLIC BLOOD PRESSURE: 130 MMHG | TEMPERATURE: 98.3 F | DIASTOLIC BLOOD PRESSURE: 80 MMHG | BODY MASS INDEX: 46.86 KG/M2 | WEIGHT: 252.1 LBS | HEART RATE: 96 BPM

## 2021-02-04 DIAGNOSIS — N95.0 POSTMENOPAUSAL BLEEDING: Primary | ICD-10-CM

## 2021-02-04 DIAGNOSIS — N84.0 ABNORMAL UTERINE BLEEDING DUE TO ENDOMETRIAL POLYP: ICD-10-CM

## 2021-02-04 DIAGNOSIS — N93.9 ABNORMAL UTERINE BLEEDING DUE TO ENDOMETRIAL POLYP: ICD-10-CM

## 2021-02-04 PROBLEM — M25.561 CHRONIC PAIN OF BOTH KNEES: Status: ACTIVE | Noted: 2021-02-04

## 2021-02-04 PROBLEM — M25.562 CHRONIC PAIN OF BOTH KNEES: Status: ACTIVE | Noted: 2021-02-04

## 2021-02-04 PROBLEM — G89.29 CHRONIC PAIN OF BOTH KNEES: Status: ACTIVE | Noted: 2021-02-04

## 2021-02-04 PROCEDURE — 99213 OFFICE O/P EST LOW 20 MIN: CPT | Performed by: STUDENT IN AN ORGANIZED HEALTH CARE EDUCATION/TRAINING PROGRAM

## 2021-02-04 ASSESSMENT — PAIN SCALES - GENERAL: PAINLEVEL: NO PAIN (0)

## 2021-02-04 NOTE — NURSING NOTE
Pt presents to clinic today for follow up hysteroscopy polypectomy. Pt states she is still having some spotting.      Medication Reconciliation: complete  Norma Hemphill LPN

## 2021-02-04 NOTE — PROGRESS NOTES
Postoperative Visit  2021    S: Michelle Du is a 55 year old  here for post-operative visit following a hysteroscopy dilation and curettage, polypectomy on 21 after she presented with postmenopausal bleeding. She reports feeling well and has no acute concerns. Her pain improved after a few days, she no longer has discharge. She denies fevers, chills, changes in appetite or urinary or bowel changes.    We reviewed her pathology together which showed benign endometrial polyp with background weakly prolierative endometrium, potential irregular endomyometrial junction vs. Adenomyosis. Negative for atypia and malignancy.    O:   /80   Pulse 96   Temp 98.3  F (36.8  C) (Tympanic)   Wt 114.4 kg (252 lb 1.6 oz)   LMP 2018 (Exact Date)   BMI 46.86 kg/m      Gen:  Well-appearing, NAD  Abd: Non-tender  Pelvic: Politely declined as she has no concerns. Noted no incisions etc. To follow up during procedure.    A/P:   Ms. Michelle Du is a 55 year old  who originally presented with postmenopausal bleeding s/p hysteroscopy dilation and curettage, polypectomy on 21. Doing well, no concerns.  -Plan for follow up with any new concerns or if new questions arise    Total amount of time spent on encounter including chart prep, face to face time, review of pathology and documentation was 20 minutes

## 2021-02-26 ENCOUNTER — HOSPITAL ENCOUNTER (OUTPATIENT)
Dept: BONE DENSITY | Facility: OTHER | Age: 56
Discharge: HOME | End: 2021-02-26
Attending: PHYSICIAN ASSISTANT | Admitting: PHYSICIAN ASSISTANT
Payer: COMMERCIAL

## 2021-02-26 ENCOUNTER — MYC MEDICAL ADVICE (OUTPATIENT)
Dept: FAMILY MEDICINE | Facility: OTHER | Age: 56
End: 2021-02-26

## 2021-02-26 DIAGNOSIS — Z13.820 SCREENING FOR OSTEOPOROSIS: ICD-10-CM

## 2021-02-26 PROCEDURE — 77080 DXA BONE DENSITY AXIAL: CPT

## 2021-03-12 ENCOUNTER — OFFICE VISIT (OUTPATIENT)
Dept: FAMILY MEDICINE | Facility: OTHER | Age: 56
End: 2021-03-12
Attending: PHYSICIAN ASSISTANT
Payer: COMMERCIAL

## 2021-03-12 VITALS
DIASTOLIC BLOOD PRESSURE: 80 MMHG | BODY MASS INDEX: 46.41 KG/M2 | RESPIRATION RATE: 16 BRPM | WEIGHT: 252.2 LBS | HEART RATE: 72 BPM | SYSTOLIC BLOOD PRESSURE: 110 MMHG | TEMPERATURE: 98.2 F | HEIGHT: 62 IN

## 2021-03-12 DIAGNOSIS — E78.2 MIXED HYPERLIPIDEMIA: ICD-10-CM

## 2021-03-12 DIAGNOSIS — M25.562 CHRONIC PAIN OF BOTH KNEES: ICD-10-CM

## 2021-03-12 DIAGNOSIS — Z01.818 PREOP GENERAL PHYSICAL EXAM: Primary | ICD-10-CM

## 2021-03-12 DIAGNOSIS — M25.561 CHRONIC PAIN OF BOTH KNEES: ICD-10-CM

## 2021-03-12 DIAGNOSIS — G89.29 CHRONIC PAIN OF BOTH KNEES: ICD-10-CM

## 2021-03-12 PROBLEM — N95.0 POSTMENOPAUSAL BLEEDING: Status: RESOLVED | Noted: 2021-01-12 | Resolved: 2021-03-12

## 2021-03-12 PROBLEM — E66.9 OBESITY: Status: RESOLVED | Noted: 2018-02-02 | Resolved: 2021-03-12

## 2021-03-12 LAB
ALBUMIN UR-MCNC: NEGATIVE MG/DL
APPEARANCE UR: CLEAR
BILIRUB UR QL STRIP: NEGATIVE
COLOR UR AUTO: NORMAL
GLUCOSE UR STRIP-MCNC: NEGATIVE MG/DL
HGB BLD-MCNC: 13.5 G/DL (ref 11.7–15.7)
HGB UR QL STRIP: NEGATIVE
KETONES UR STRIP-MCNC: NEGATIVE MG/DL
LEUKOCYTE ESTERASE UR QL STRIP: NEGATIVE
NITRATE UR QL: NEGATIVE
PH UR STRIP: 6 PH (ref 5–7)
SOURCE: NORMAL
SP GR UR STRIP: 1.02 (ref 1–1.03)
UROBILINOGEN UR STRIP-MCNC: NORMAL MG/DL (ref 0–2)

## 2021-03-12 PROCEDURE — 99214 OFFICE O/P EST MOD 30 MIN: CPT | Performed by: PHYSICIAN ASSISTANT

## 2021-03-12 PROCEDURE — 36415 COLL VENOUS BLD VENIPUNCTURE: CPT | Mod: ZL | Performed by: PHYSICIAN ASSISTANT

## 2021-03-12 PROCEDURE — 81003 URINALYSIS AUTO W/O SCOPE: CPT | Mod: ZL | Performed by: PHYSICIAN ASSISTANT

## 2021-03-12 PROCEDURE — 85018 HEMOGLOBIN: CPT | Mod: ZL | Performed by: PHYSICIAN ASSISTANT

## 2021-03-12 ASSESSMENT — PAIN SCALES - GENERAL: PAINLEVEL: SEVERE PAIN (6)

## 2021-03-12 ASSESSMENT — MIFFLIN-ST. JEOR: SCORE: 1689.28

## 2021-03-12 NOTE — H&P (VIEW-ONLY)
[facility]  [address]  [place] [address]  Phone: [phone]  Fax: [phone]  Primary Provider: Cristal Naranjo  Pre-op Performing Provider: CRISTAL NARANJO      PREOPERATIVE EVALUATION:  Today's date: 3/12/2021    Michelle Du is a 55 year old female who presents for a preoperative evaluation.    Surgical Information:  Surgery/Procedure: Right TKA  Surgery Location: [facility]  Surgeon: Sabina  Surgery Date: 4/6/21  Time of Surgery:   Where patient plans to recover: At home with family  Fax number for surgical facility:     Type of Anesthesia Anticipated: Spinal    Assessment & Plan     The proposed surgical procedure is considered INTERMEDIATE risk.    Problem List Items Addressed This Visit        Nervous and Auditory    Chronic pain of both knees       Endocrine    Hyperlipidemia      Other Visit Diagnoses     Preop general physical exam    -  Primary    Relevant Orders    Hemoglobin (Completed)    UA reflex to Microscopic (Completed)         Completed hemoglobin and urinalysis which were unremarkable.    Risks and Recommendations:  The patient has the following additional risks and recommendations for perioperative complications:   - Morbid obesity (BMI >40)    Medication Instructions:  Patient Instructions   Patient should take the following medications the morning of surgery with a small sip of water: hold all meds.  Patient was instructed to hold the following medications the morning of surgery: hold all meds.     Patient was advised to call our office and the surgical services with any change in condition or new symptoms if they were to develop between today and their surgical date.  Especially any cardiopulmonary symptoms or symptoms concerning for an infection.     Discontinue aspirin, aleve, naproxen and ibuprofen 7 days prior to surgery to reduce bleeding risk.  It is fine to take tylenol the week before surgery.  Hold vitamins and herbal remedies for 7  days before surgery.    Please have a [facility] COVID-19 test on 4/2/2021.   Please stay in your car and call [phone] when you arrive.         RECOMMENDATION:  APPROVAL GIVEN to proceed with proposed procedure, without further diagnostic evaluation.    Subjective     HPI related to upcoming procedure: Patient has been struggling with right knee pain over the last couple years.  Her right knee tends to be very painful.  It gets very stiff especially when she tries to stand up.  Currently scheduled for right knee replacement.  Patient has tolerated surgery well in the past.    Preop Questions 3/12/2021   1. Have you ever had a heart attack or stroke? No   2. Have you ever had surgery on your heart or blood vessels, such as a stent placement, a coronary artery bypass, or surgery on an artery in your head, neck, heart, or legs? No   3. Do you have chest pain with activity? No   4. Do you have a history of  heart failure? No   5. Do you currently have a cold, bronchitis or symptoms of other infection? No   6. Do you have a cough, shortness of breath, or wheezing? No   7. Do you or anyone in your family have previous history of blood clots? No   8. Do you or does anyone in your family have a serious bleeding problem such as prolonged bleeding following surgeries or cuts? No   9. Have you ever had problems with anemia or been told to take iron pills? YES - many years ago with GI issues, none since then   10. Have you had any abnormal blood loss such as black, tarry or bloody stools, or abnormal vaginal bleeding? No   11. Have you ever had a blood transfusion? YES - many years ago with GI issues   11a. Have you ever had a transfusion reaction? No   12. Are you willing to have a blood transfusion if it is medically needed before, during, or after your surgery? Yes   13. Have you or any of your relatives ever had problems with anesthesia? No   14. Do you have sleep apnea, excessive snoring or daytime drowsiness? YES -  Have sleep apnea, have a CPAP machine   14a. Do you have a CPAP machine? Yes   15. Do you have any artifical heart valves or other implanted medical devices like a pacemaker, defibrillator, or continuous glucose monitor? No   16. Do you have artificial joints? No   17. Are you allergic to latex? No   18. Is there any chance that you may be pregnant? No       Health Care Directive:  Patient does not have a Health Care Directive or Living Will: Discussed advance care planning with patient; information given to patient to review.    Preoperative Review of :   reviewed - controlled substances previously prescribed, therapy completed      Status of Chronic Conditions:  HYPERLIPIDEMIA -patient is history of hyperlipidemia that is diet controlled.  At this time medication is not warranted per the patient's risk factors.  Patient continues to work on good diet, exercise, weight loss, and decreasing fat in her diet.    Patient has tolerated surgery well in the past.  Patient has no recent cough or cold symptoms.  No recent fevers, chills, sore throat, ear pain, chest pain, palpitations, problems breathing, stomachaches, nausea, vomiting, diarrhea, constipation, blood in stool or urine, dysuria, frequency, urgency.    Patient can walk up a flight of stairs without becoming short of breath or having chest pain: YES   Patient is able to tolerate greater than 4 METs of activity without any cardiopulmonary symptoms.        Review of Systems  CONSTITUTIONAL: NEGATIVE for fever, chills, change in weight  INTEGUMENTARY/SKIN: NEGATIVE for worrisome rashes, moles or lesions  EYES: NEGATIVE for vision changes or irritation  ENT/MOUTH: NEGATIVE for ear, mouth and throat problems  RESP: NEGATIVE for significant cough or SOB  BREAST: NEGATIVE for masses, tenderness or discharge  CV: NEGATIVE for chest pain, palpitations or peripheral edema  GI: NEGATIVE for nausea, abdominal pain, heartburn, or change in bowel habits  : NEGATIVE  for frequency, dysuria, or hematuria  MUSCULOSKELETAL: NEGATIVE for significant arthralgias or myalgia  NEURO: NEGATIVE for weakness, dizziness or paresthesias  ENDOCRINE: NEGATIVE for temperature intolerance, skin/hair changes  HEME: NEGATIVE for bleeding problems  PSYCHIATRIC: NEGATIVE for changes in mood or affect    Patient Active Problem List    Diagnosis Date Noted     Chronic pain of both knees 2021     Priority: Medium     Added automatically from request for surgery [id number]       Morbid obesity (H) 2021     Priority: Medium     Hypovitaminosis D 2017     Priority: Medium     Old tear of medial meniscus of right knee 2017     Priority: Medium     Tubular adenoma of colon 2014     Priority: Medium     Backache 06/10/2013     Priority: Medium     Hyperlipidemia 10/01/2010     Priority: Medium      Past Medical History:   Diagnosis Date     Anemia 3/28/2013     Benign neoplasm of colon     2014,2013-repeat colonoscopy in 2018     Diverticulosis of large intestine without perforation or abscess without bleeding     3/29/2013     Gastritis without bleeding     3/29/2013     Gastro-esophageal reflux disease without esophagitis     No Comments Provided     History of colonic polyps     3/29/2013     Insomnia     No Comments Provided     Postmenopausal bleeding 2021    Added automatically from request for surgery [id number]     Urge incontinence     2011     Past Surgical History:   Procedure Laterality Date     ARTHROSCOPY KNEE BILATERAL       carpel tunnel surgery - bilateral        SECTION      x 2     COLONOSCOPY  2013    3/29/13,Colonoscopy F/U      DILATION AND CURETTAGE, OPERATIVE HYSTEROSCOPY, COMBINED N/A 2021    Procedure: HYSTEROSCOPY, WITH DILATION AND CURETTAGE OF UTERUS;  Surgeon: Caridad Amaya MD;  Location:  OR     ESOPHAGOSCOPY, GASTROSCOPY, DUODENOSCOPY (EGD), COMBINED      3/29 13,EGD     LAPAROSCOPIC TUBAL LIGATION      No  "Comments Provided     Current Outpatient Medications   Medication Sig Dispense Refill     aspirin (ASA) 81 MG EC tablet Take 1 tablet (81 mg) by mouth daily       calcium carbonate-vitamin D (OSCAL W/D) 500-200 MG-UNIT tablet Take 1 tablet by mouth 2 times daily       Glucosamine-Chondroit-Vit C-Mn (GLUCOSAMINE CHONDROITIN 500 COMPLEX) CAPS Takes 2 daily       multivitamin w/minerals (THERA-VIT-M) tablet Take 1 tablet by mouth daily         Allergies   Allergen Reactions     Penicillins Rash     Sulfa Drugs Rash        Social History     Tobacco Use     Smoking status: Never Smoker     Smokeless tobacco: Never Used   Substance Use Topics     Alcohol use: No     Alcohol/week: 0.0 standard drinks     Family History   Problem Relation Age of Onset     Diabetes Father         Diabetes     Hypertension Father         Hypertension     Other - See Comments Father         metastatic melanoma     Liver Cancer Father      Heart Disease Mother         Heart Disease,acute MI     Cancer Sister         uterine?     Diabetes Sister      Hypertension Sister      Hypertension Sister      Breast Cancer No family hx of         Cancer-breast     History   Drug Use No         Objective     /80 (BP Location: Right arm, Patient Position: Sitting, Cuff Size: Adult Large)   Pulse 72   Temp 98.2  F (36.8  C)   Resp 16   Ht 1.562 m (5' 1.5\")   Wt 114.4 kg (252 lb 3.2 oz)   LMP 05/25/2018 (Exact Date)   BMI 46.88 kg/m      Physical Exam    GENERAL APPEARANCE: healthy, alert and no distress     EYES: EOMI, PERRL     HENT: ear canals and TM's normal and nose and mouth without ulcers or lesions     NECK: no adenopathy, no asymmetry, masses, or scars and thyroid normal to palpation     RESP: lungs clear to auscultation - no rales, rhonchi or wheezes     CV: regular rates and rhythm, normal S1 S2, no S3 or S4 and no murmur, click or rub     ABDOMEN:  soft, nontender, no HSM or masses and bowel sounds normal     MS: extremities normal- " no gross deformities noted, no evidence of inflammation in joints, FROM in all extremities.     SKIN: no suspicious lesions or rashes     NEURO: Normal strength and tone, sensory exam grossly normal, mentation intact and speech normal     PSYCH: mentation appears normal. and affect normal/bright     LYMPHATICS: No cervical adenopathy    Recent Labs   Lab Test 21  0835 21  1607 21  0909   HGB 13.2 14.2  --      --   --    NA  --   --  140   POTASSIUM  --   --  4.3   CR  --   --  0.77        Diagnostics:  Recent Results (from the past 24 hour(s))   Hemoglobin    Collection Time: 21  9:17 AM   Result Value Ref Range    Hemoglobin 13.5 11.7 - 15.7 g/dL   UA reflex to Microscopic    Collection Time: 21  9:24 AM   Result Value Ref Range    Color Urine Light Yellow     Appearance Urine Clear     Glucose Urine Negative NEG^Negative mg/dL    Bilirubin Urine Negative NEG^Negative    Ketones Urine Negative NEG^Negative mg/dL    Specific Gravity Urine 1.025 1.003 - 1.035    Blood Urine Negative NEG^Negative    pH Urine 6.0 5.0 - 7.0 pH    Protein Albumin Urine Negative NEG^Negative mg/dL    Urobilinogen mg/dL Normal 0.0 - 2.0 mg/dL    Nitrite Urine Negative NEG^Negative    Leukocyte Esterase Urine Negative NEG^Negative    Source Midstream Urine       [id number] Sinus Rhythm    Revised Cardiac Risk Index (RCRI):  The patient has the following serious cardiovascular risks for perioperative complications:   - No serious cardiac risks = 0 points     RCRI Interpretation: 0 points: Class I (very low risk - 0.4% complication rate)           Signed Electronically by: Cristal Naranjo PA-C  Copy of this evaluation report is provided to requesting physician.

## 2021-03-12 NOTE — PATIENT INSTRUCTIONS
Patient should take the following medications the morning of surgery with a small sip of water: hold all meds.  Patient was instructed to hold the following medications the morning of surgery: hold all meds.     Patient was advised to call our office and the surgical services with any change in condition or new symptoms if they were to develop between today and their surgical date.  Especially any cardiopulmonary symptoms or symptoms concerning for an infection.     Discontinue aspirin, aleve, naproxen and ibuprofen 7 days prior to surgery to reduce bleeding risk.  It is fine to take tylenol the week before surgery.  Hold vitamins and herbal remedies for 7 days before surgery.    Please have a [facility] COVID-19 test on 4/2/2021.   Please stay in your car and call [phone] when you arrive.

## 2021-03-12 NOTE — PROGRESS NOTES
[facility]  [address]  [place] [address]  Phone: [phone]  Fax: [phone]  Primary Provider: Cristal Naranjo  Pre-op Performing Provider: CRISTAL NARANJO      PREOPERATIVE EVALUATION:  Today's date: 3/12/2021    Michelle Du is a 55 year old female who presents for a preoperative evaluation.    Surgical Information:  Surgery/Procedure: Right TKA  Surgery Location: [facility]  Surgeon: Sabina  Surgery Date: 4/6/21  Time of Surgery:   Where patient plans to recover: At home with family  Fax number for surgical facility:     Type of Anesthesia Anticipated: Spinal    Assessment & Plan     The proposed surgical procedure is considered INTERMEDIATE risk.    Problem List Items Addressed This Visit        Nervous and Auditory    Chronic pain of both knees       Endocrine    Hyperlipidemia      Other Visit Diagnoses     Preop general physical exam    -  Primary    Relevant Orders    Hemoglobin (Completed)    UA reflex to Microscopic (Completed)         Completed hemoglobin and urinalysis which were unremarkable.    Risks and Recommendations:  The patient has the following additional risks and recommendations for perioperative complications:   - Morbid obesity (BMI >40)    Medication Instructions:  Patient Instructions   Patient should take the following medications the morning of surgery with a small sip of water: hold all meds.  Patient was instructed to hold the following medications the morning of surgery: hold all meds.     Patient was advised to call our office and the surgical services with any change in condition or new symptoms if they were to develop between today and their surgical date.  Especially any cardiopulmonary symptoms or symptoms concerning for an infection.     Discontinue aspirin, aleve, naproxen and ibuprofen 7 days prior to surgery to reduce bleeding risk.  It is fine to take tylenol the week before surgery.  Hold vitamins and herbal remedies for 7  days before surgery.    Please have a [place] COVID-19 test on 4/2/2021.   Please stay in your car and call [phone] when you arrive.         RECOMMENDATION:  APPROVAL GIVEN to proceed with proposed procedure, without further diagnostic evaluation.    Subjective     HPI related to upcoming procedure: Patient has been struggling with right knee pain over the last couple years.  Her right knee tends to be very painful.  It gets very stiff especially when she tries to stand up.  Currently scheduled for right knee replacement.  Patient has tolerated surgery well in the past.    Preop Questions 3/12/2021   1. Have you ever had a heart attack or stroke? No   2. Have you ever had surgery on your heart or blood vessels, such as a stent placement, a coronary artery bypass, or surgery on an artery in your head, neck, heart, or legs? No   3. Do you have chest pain with activity? No   4. Do you have a history of  heart failure? No   5. Do you currently have a cold, bronchitis or symptoms of other infection? No   6. Do you have a cough, shortness of breath, or wheezing? No   7. Do you or anyone in your family have previous history of blood clots? No   8. Do you or does anyone in your family have a serious bleeding problem such as prolonged bleeding following surgeries or cuts? No   9. Have you ever had problems with anemia or been told to take iron pills? YES - many years ago with GI issues, none since then   10. Have you had any abnormal blood loss such as black, tarry or bloody stools, or abnormal vaginal bleeding? No   11. Have you ever had a blood transfusion? YES - many years ago with GI issues   11a. Have you ever had a transfusion reaction? No   12. Are you willing to have a blood transfusion if it is medically needed before, during, or after your surgery? Yes   13. Have you or any of your relatives ever had problems with anesthesia? No   14. Do you have sleep apnea, excessive snoring or daytime drowsiness? YES -  Have sleep apnea, have a CPAP machine   14a. Do you have a CPAP machine? Yes   15. Do you have any artifical heart valves or other implanted medical devices like a pacemaker, defibrillator, or continuous glucose monitor? No   16. Do you have artificial joints? No   17. Are you allergic to latex? No   18. Is there any chance that you may be pregnant? No       Health Care Directive:  Patient does not have a Health Care Directive or Living Will: Discussed advance care planning with patient; information given to patient to review.    Preoperative Review of :   reviewed - controlled substances previously prescribed, therapy completed      Status of Chronic Conditions:  HYPERLIPIDEMIA -patient is history of hyperlipidemia that is diet controlled.  At this time medication is not warranted per the patient's risk factors.  Patient continues to work on good diet, exercise, weight loss, and decreasing fat in her diet.    Patient has tolerated surgery well in the past.  Patient has no recent cough or cold symptoms.  No recent fevers, chills, sore throat, ear pain, chest pain, palpitations, problems breathing, stomachaches, nausea, vomiting, diarrhea, constipation, blood in stool or urine, dysuria, frequency, urgency.    Patient can walk up a flight of stairs without becoming short of breath or having chest pain: YES   Patient is able to tolerate greater than 4 METs of activity without any cardiopulmonary symptoms.        Review of Systems  CONSTITUTIONAL: NEGATIVE for fever, chills, change in weight  INTEGUMENTARY/SKIN: NEGATIVE for worrisome rashes, moles or lesions  EYES: NEGATIVE for vision changes or irritation  ENT/MOUTH: NEGATIVE for ear, mouth and throat problems  RESP: NEGATIVE for significant cough or SOB  BREAST: NEGATIVE for masses, tenderness or discharge  CV: NEGATIVE for chest pain, palpitations or peripheral edema  GI: NEGATIVE for nausea, abdominal pain, heartburn, or change in bowel habits  : NEGATIVE  for frequency, dysuria, or hematuria  MUSCULOSKELETAL: NEGATIVE for significant arthralgias or myalgia  NEURO: NEGATIVE for weakness, dizziness or paresthesias  ENDOCRINE: NEGATIVE for temperature intolerance, skin/hair changes  HEME: NEGATIVE for bleeding problems  PSYCHIATRIC: NEGATIVE for changes in mood or affect    Patient Active Problem List    Diagnosis Date Noted     Chronic pain of both knees 2021     Priority: Medium     Added automatically from request for surgery [id number]       Morbid obesity (H) 2021     Priority: Medium     Hypovitaminosis D 2017     Priority: Medium     Old tear of medial meniscus of right knee 2017     Priority: Medium     Tubular adenoma of colon 2014     Priority: Medium     Backache 06/10/2013     Priority: Medium     Hyperlipidemia 10/01/2010     Priority: Medium      Past Medical History:   Diagnosis Date     Anemia 3/28/2013     Benign neoplasm of colon     2014,2013-repeat colonoscopy in 2018     Diverticulosis of large intestine without perforation or abscess without bleeding     3/29/2013     Gastritis without bleeding     3/29/2013     Gastro-esophageal reflux disease without esophagitis     No Comments Provided     History of colonic polyps     3/29/2013     Insomnia     No Comments Provided     Postmenopausal bleeding 2021    Added automatically from request for surgery [id number]     Urge incontinence     2011     Past Surgical History:   Procedure Laterality Date     ARTHROSCOPY KNEE BILATERAL       carpel tunnel surgery - bilateral        SECTION      x 2     COLONOSCOPY  2013    3/29/13,Colonoscopy F/U      DILATION AND CURETTAGE, OPERATIVE HYSTEROSCOPY, COMBINED N/A 2021    Procedure: HYSTEROSCOPY, WITH DILATION AND CURETTAGE OF UTERUS;  Surgeon: Caridad Amaya MD;  Location:  OR     ESOPHAGOSCOPY, GASTROSCOPY, DUODENOSCOPY (EGD), COMBINED      3/29 13,EGD     LAPAROSCOPIC TUBAL LIGATION      No  "Comments Provided     Current Outpatient Medications   Medication Sig Dispense Refill     aspirin (ASA) 81 MG EC tablet Take 1 tablet (81 mg) by mouth daily       calcium carbonate-vitamin D (OSCAL W/D) 500-200 MG-UNIT tablet Take 1 tablet by mouth 2 times daily       Glucosamine-Chondroit-Vit C-Mn (GLUCOSAMINE CHONDROITIN 500 COMPLEX) CAPS Takes 2 daily       multivitamin w/minerals (THERA-VIT-M) tablet Take 1 tablet by mouth daily         Allergies   Allergen Reactions     Penicillins Rash     Sulfa Drugs Rash        Social History     Tobacco Use     Smoking status: Never Smoker     Smokeless tobacco: Never Used   Substance Use Topics     Alcohol use: No     Alcohol/week: 0.0 standard drinks     Family History   Problem Relation Age of Onset     Diabetes Father         Diabetes     Hypertension Father         Hypertension     Other - See Comments Father         metastatic melanoma     Liver Cancer Father      Heart Disease Mother         Heart Disease,acute MI     Cancer Sister         uterine?     Diabetes Sister      Hypertension Sister      Hypertension Sister      Breast Cancer No family hx of         Cancer-breast     History   Drug Use No         Objective     /80 (BP Location: Right arm, Patient Position: Sitting, Cuff Size: Adult Large)   Pulse 72   Temp 98.2  F (36.8  C)   Resp 16   Ht 1.562 m (5' 1.5\")   Wt 114.4 kg (252 lb 3.2 oz)   LMP 05/25/2018 (Exact Date)   BMI 46.88 kg/m      Physical Exam    GENERAL APPEARANCE: healthy, alert and no distress     EYES: EOMI, PERRL     HENT: ear canals and TM's normal and nose and mouth without ulcers or lesions     NECK: no adenopathy, no asymmetry, masses, or scars and thyroid normal to palpation     RESP: lungs clear to auscultation - no rales, rhonchi or wheezes     CV: regular rates and rhythm, normal S1 S2, no S3 or S4 and no murmur, click or rub     ABDOMEN:  soft, nontender, no HSM or masses and bowel sounds normal     MS: extremities normal- " no gross deformities noted, no evidence of inflammation in joints, FROM in all extremities.     SKIN: no suspicious lesions or rashes     NEURO: Normal strength and tone, sensory exam grossly normal, mentation intact and speech normal     PSYCH: mentation appears normal. and affect normal/bright     LYMPHATICS: No cervical adenopathy    Recent Labs   Lab Test 21  0835 21  1607 21  0909   HGB 13.2 14.2  --      --   --    NA  --   --  140   POTASSIUM  --   --  4.3   CR  --   --  0.77        Diagnostics:  Recent Results (from the past 24 hour(s))   Hemoglobin    Collection Time: 21  9:17 AM   Result Value Ref Range    Hemoglobin 13.5 11.7 - 15.7 g/dL   UA reflex to Microscopic    Collection Time: 21  9:24 AM   Result Value Ref Range    Color Urine Light Yellow     Appearance Urine Clear     Glucose Urine Negative NEG^Negative mg/dL    Bilirubin Urine Negative NEG^Negative    Ketones Urine Negative NEG^Negative mg/dL    Specific Gravity Urine 1.025 1.003 - 1.035    Blood Urine Negative NEG^Negative    pH Urine 6.0 5.0 - 7.0 pH    Protein Albumin Urine Negative NEG^Negative mg/dL    Urobilinogen mg/dL Normal 0.0 - 2.0 mg/dL    Nitrite Urine Negative NEG^Negative    Leukocyte Esterase Urine Negative NEG^Negative    Source Midstream Urine       EK2021 Sinus Rhythm    Revised Cardiac Risk Index (RCRI):  The patient has the following serious cardiovascular risks for perioperative complications:   - No serious cardiac risks = 0 points     RCRI Interpretation: 0 points: Class I (very low risk - 0.4% complication rate)           Signed Electronically by: Cristal Naranjo PA-C  Copy of this evaluation report is provided to requesting physician.

## 2021-04-02 ENCOUNTER — ALLIED HEALTH/NURSE VISIT (OUTPATIENT)
Dept: FAMILY MEDICINE | Facility: OTHER | Age: 56
End: 2021-04-02
Attending: PHYSICIAN ASSISTANT
Payer: COMMERCIAL

## 2021-04-02 DIAGNOSIS — Z20.822 COVID-19 RULED OUT: Primary | ICD-10-CM

## 2021-04-02 PROCEDURE — U0003 INFECTIOUS AGENT DETECTION BY NUCLEIC ACID (DNA OR RNA); SEVERE ACUTE RESPIRATORY SYNDROME CORONAVIRUS 2 (SARS-COV-2) (CORONAVIRUS DISEASE [COVID-19]), AMPLIFIED PROBE TECHNIQUE, MAKING USE OF HIGH THROUGHPUT TECHNOLOGIES AS DESCRIBED BY CMS-2020-01-R: HCPCS | Mod: ZL | Performed by: ORTHOPAEDIC SURGERY

## 2021-04-02 PROCEDURE — C9803 HOPD COVID-19 SPEC COLLECT: HCPCS

## 2021-04-02 PROCEDURE — U0005 INFEC AGEN DETEC AMPLI PROBE: HCPCS | Mod: ZL | Performed by: ORTHOPAEDIC SURGERY

## 2021-04-03 LAB
SARS-COV-2 RNA RESP QL NAA+PROBE: NOT DETECTED
SPECIMEN SOURCE: NORMAL

## 2021-04-05 ENCOUNTER — ANESTHESIA EVENT (OUTPATIENT)
Dept: SURGERY | Facility: OTHER | Age: 56
DRG: 470 | End: 2021-04-05
Payer: COMMERCIAL

## 2021-04-06 ENCOUNTER — ANESTHESIA (OUTPATIENT)
Dept: SURGERY | Facility: OTHER | Age: 56
DRG: 470 | End: 2021-04-06
Payer: COMMERCIAL

## 2021-04-06 ENCOUNTER — HOSPITAL ENCOUNTER (INPATIENT)
Facility: OTHER | Age: 56
LOS: 2 days | Discharge: HOME | DRG: 470 | End: 2021-04-08
Attending: ORTHOPAEDIC SURGERY | Admitting: ORTHOPAEDIC SURGERY
Payer: COMMERCIAL

## 2021-04-06 ENCOUNTER — APPOINTMENT (OUTPATIENT)
Dept: GENERAL RADIOLOGY | Facility: OTHER | Age: 56
DRG: 470 | End: 2021-04-06
Attending: ORTHOPAEDIC SURGERY
Payer: COMMERCIAL

## 2021-04-06 ENCOUNTER — APPOINTMENT (OUTPATIENT)
Dept: PHYSICAL THERAPY | Facility: OTHER | Age: 56
DRG: 470 | End: 2021-04-06
Attending: ORTHOPAEDIC SURGERY
Payer: COMMERCIAL

## 2021-04-06 DIAGNOSIS — M25.561 CHRONIC PAIN OF BOTH KNEES: ICD-10-CM

## 2021-04-06 DIAGNOSIS — M25.562 CHRONIC PAIN OF BOTH KNEES: ICD-10-CM

## 2021-04-06 DIAGNOSIS — G89.29 CHRONIC PAIN OF BOTH KNEES: ICD-10-CM

## 2021-04-06 PROCEDURE — 250N000011 HC RX IP 250 OP 636: Performed by: ORTHOPAEDIC SURGERY

## 2021-04-06 PROCEDURE — 250N000009 HC RX 250

## 2021-04-06 PROCEDURE — 999N000157 HC STATISTIC RCP TIME EA 10 MIN

## 2021-04-06 PROCEDURE — 250N000009 HC RX 250: Performed by: ORTHOPAEDIC SURGERY

## 2021-04-06 PROCEDURE — 0SRC0J9 REPLACEMENT OF RIGHT KNEE JOINT WITH SYNTHETIC SUBSTITUTE, CEMENTED, OPEN APPROACH: ICD-10-PCS | Performed by: ORTHOPAEDIC SURGERY

## 2021-04-06 PROCEDURE — 250N000009 HC RX 250: Performed by: NURSE ANESTHETIST, CERTIFIED REGISTERED

## 2021-04-06 PROCEDURE — 64447 NJX AA&/STRD FEMORAL NRV IMG: CPT | Mod: RT | Performed by: NURSE ANESTHETIST, CERTIFIED REGISTERED

## 2021-04-06 PROCEDURE — 250N000011 HC RX IP 250 OP 636: Performed by: NURSE ANESTHETIST, CERTIFIED REGISTERED

## 2021-04-06 PROCEDURE — C1776 JOINT DEVICE (IMPLANTABLE): HCPCS | Performed by: ORTHOPAEDIC SURGERY

## 2021-04-06 PROCEDURE — 710N000010 HC RECOVERY PHASE 1, LEVEL 2, PER MIN: Performed by: ORTHOPAEDIC SURGERY

## 2021-04-06 PROCEDURE — 99221 1ST HOSP IP/OBS SF/LOW 40: CPT | Performed by: FAMILY MEDICINE

## 2021-04-06 PROCEDURE — 27447 TOTAL KNEE ARTHROPLASTY: CPT | Mod: RT | Performed by: ORTHOPAEDIC SURGERY

## 2021-04-06 PROCEDURE — 27447 TOTAL KNEE ARTHROPLASTY: CPT | Performed by: NURSE ANESTHETIST, CERTIFIED REGISTERED

## 2021-04-06 PROCEDURE — 76942 ECHO GUIDE FOR BIOPSY: CPT | Mod: 26 | Performed by: NURSE ANESTHETIST, CERTIFIED REGISTERED

## 2021-04-06 PROCEDURE — 370N000017 HC ANESTHESIA TECHNICAL FEE, PER MIN: Performed by: ORTHOPAEDIC SURGERY

## 2021-04-06 PROCEDURE — 258N000003 HC RX IP 258 OP 636: Performed by: NURSE ANESTHETIST, CERTIFIED REGISTERED

## 2021-04-06 PROCEDURE — 97161 PT EVAL LOW COMPLEX 20 MIN: CPT | Mod: GP

## 2021-04-06 PROCEDURE — 360N000077 HC SURGERY LEVEL 4, PER MIN: Performed by: ORTHOPAEDIC SURGERY

## 2021-04-06 PROCEDURE — 97530 THERAPEUTIC ACTIVITIES: CPT | Mod: GP

## 2021-04-06 PROCEDURE — 120N000001 HC R&B MED SURG/OB

## 2021-04-06 PROCEDURE — 999N000141 HC STATISTIC PRE-PROCEDURE NURSING ASSESSMENT: Performed by: ORTHOPAEDIC SURGERY

## 2021-04-06 PROCEDURE — 250N000013 HC RX MED GY IP 250 OP 250 PS 637: Performed by: ORTHOPAEDIC SURGERY

## 2021-04-06 PROCEDURE — 73560 X-RAY EXAM OF KNEE 1 OR 2: CPT | Mod: RT

## 2021-04-06 PROCEDURE — 272N000002 HC OR SUPPLY OTHER OPNP: Performed by: ORTHOPAEDIC SURGERY

## 2021-04-06 PROCEDURE — 258N000001 HC RX 258: Performed by: ORTHOPAEDIC SURGERY

## 2021-04-06 PROCEDURE — 272N000001 HC OR GENERAL SUPPLY STERILE: Performed by: ORTHOPAEDIC SURGERY

## 2021-04-06 PROCEDURE — 278N000051 HC OR IMPLANT GENERAL: Performed by: ORTHOPAEDIC SURGERY

## 2021-04-06 DEVICE — IMPLANTABLE DEVICE
Type: IMPLANTABLE DEVICE | Site: KNEE | Status: FUNCTIONAL
Brand: PERSONA® VIVACIT-E®

## 2021-04-06 DEVICE — TOBRA FULL DOSE ANTIBIOTIC BONE CEMENT, 10 PACK CATALOG NUMBER IS 6197-9-010
Type: IMPLANTABLE DEVICE | Site: KNEE | Status: FUNCTIONAL
Brand: SIMPLEX

## 2021-04-06 DEVICE — IMPLANTABLE DEVICE
Type: IMPLANTABLE DEVICE | Site: KNEE | Status: FUNCTIONAL
Brand: PERSONA® NATURAL TIBIA®

## 2021-04-06 DEVICE — IMPLANTABLE DEVICE
Type: IMPLANTABLE DEVICE | Site: KNEE | Status: FUNCTIONAL
Brand: PERSONA®

## 2021-04-06 RX ORDER — HYDROMORPHONE HYDROCHLORIDE 1 MG/ML
0.4 INJECTION, SOLUTION INTRAMUSCULAR; INTRAVENOUS; SUBCUTANEOUS
Status: DISCONTINUED | OUTPATIENT
Start: 2021-04-06 | End: 2021-04-08 | Stop reason: HOSPADM

## 2021-04-06 RX ORDER — HYDROMORPHONE HYDROCHLORIDE 1 MG/ML
0.2 INJECTION, SOLUTION INTRAMUSCULAR; INTRAVENOUS; SUBCUTANEOUS
Status: DISCONTINUED | OUTPATIENT
Start: 2021-04-06 | End: 2021-04-08 | Stop reason: HOSPADM

## 2021-04-06 RX ORDER — BUPIVACAINE HYDROCHLORIDE 7.5 MG/ML
INJECTION, SOLUTION INTRASPINAL PRN
Status: DISCONTINUED | OUTPATIENT
Start: 2021-04-06 | End: 2021-04-06

## 2021-04-06 RX ORDER — NALOXONE HYDROCHLORIDE 0.4 MG/ML
0.2 INJECTION, SOLUTION INTRAMUSCULAR; INTRAVENOUS; SUBCUTANEOUS
Status: DISCONTINUED | OUTPATIENT
Start: 2021-04-06 | End: 2021-04-06

## 2021-04-06 RX ORDER — NALOXONE HYDROCHLORIDE 0.4 MG/ML
0.4 INJECTION, SOLUTION INTRAMUSCULAR; INTRAVENOUS; SUBCUTANEOUS
Status: DISCONTINUED | OUTPATIENT
Start: 2021-04-06 | End: 2021-04-06

## 2021-04-06 RX ORDER — ONDANSETRON 2 MG/ML
4 INJECTION INTRAMUSCULAR; INTRAVENOUS EVERY 6 HOURS PRN
Status: DISCONTINUED | OUTPATIENT
Start: 2021-04-06 | End: 2021-04-08 | Stop reason: HOSPADM

## 2021-04-06 RX ORDER — NALOXONE HYDROCHLORIDE 0.4 MG/ML
0.4 INJECTION, SOLUTION INTRAMUSCULAR; INTRAVENOUS; SUBCUTANEOUS
Status: DISCONTINUED | OUTPATIENT
Start: 2021-04-06 | End: 2021-04-08 | Stop reason: HOSPADM

## 2021-04-06 RX ORDER — LIDOCAINE HYDROCHLORIDE 10 MG/ML
INJECTION, SOLUTION EPIDURAL; INFILTRATION; INTRACAUDAL; PERINEURAL PRN
Status: DISCONTINUED | OUTPATIENT
Start: 2021-04-06 | End: 2021-04-06

## 2021-04-06 RX ORDER — CLINDAMYCIN PHOSPHATE 900 MG/50ML
900 INJECTION, SOLUTION INTRAVENOUS
Status: DISCONTINUED | OUTPATIENT
Start: 2021-04-06 | End: 2021-04-06 | Stop reason: HOSPADM

## 2021-04-06 RX ORDER — CLINDAMYCIN PHOSPHATE 900 MG/50ML
900 INJECTION, SOLUTION INTRAVENOUS SEE ADMIN INSTRUCTIONS
Status: DISCONTINUED | OUTPATIENT
Start: 2021-04-06 | End: 2021-04-06 | Stop reason: HOSPADM

## 2021-04-06 RX ORDER — DOCUSATE SODIUM 100 MG/1
100 CAPSULE, LIQUID FILLED ORAL 2 TIMES DAILY
Status: DISCONTINUED | OUTPATIENT
Start: 2021-04-06 | End: 2021-04-08 | Stop reason: HOSPADM

## 2021-04-06 RX ORDER — HYDROXYZINE PAMOATE 25 MG/1
25 CAPSULE ORAL EVERY 6 HOURS PRN
Status: DISCONTINUED | OUTPATIENT
Start: 2021-04-06 | End: 2021-04-08 | Stop reason: HOSPADM

## 2021-04-06 RX ORDER — TRANEXAMIC ACID 650 MG/1
1950 TABLET ORAL ONCE
Status: COMPLETED | OUTPATIENT
Start: 2021-04-06 | End: 2021-04-06

## 2021-04-06 RX ORDER — OXYCODONE HYDROCHLORIDE 5 MG/1
10 TABLET ORAL EVERY 4 HOURS PRN
Status: DISCONTINUED | OUTPATIENT
Start: 2021-04-06 | End: 2021-04-08 | Stop reason: HOSPADM

## 2021-04-06 RX ORDER — BISACODYL 10 MG
10 SUPPOSITORY, RECTAL RECTAL DAILY PRN
Status: DISCONTINUED | OUTPATIENT
Start: 2021-04-06 | End: 2021-04-08 | Stop reason: HOSPADM

## 2021-04-06 RX ORDER — HYDROMORPHONE HYDROCHLORIDE 1 MG/ML
.3-.5 INJECTION, SOLUTION INTRAMUSCULAR; INTRAVENOUS; SUBCUTANEOUS EVERY 5 MIN PRN
Status: DISCONTINUED | OUTPATIENT
Start: 2021-04-06 | End: 2021-04-06 | Stop reason: HOSPADM

## 2021-04-06 RX ORDER — CLINDAMYCIN PHOSPHATE 900 MG/50ML
900 INJECTION, SOLUTION INTRAVENOUS EVERY 8 HOURS
Status: COMPLETED | OUTPATIENT
Start: 2021-04-06 | End: 2021-04-07

## 2021-04-06 RX ORDER — ACETAMINOPHEN 325 MG/1
650 TABLET ORAL EVERY 4 HOURS PRN
Status: DISCONTINUED | OUTPATIENT
Start: 2021-04-09 | End: 2021-04-08 | Stop reason: HOSPADM

## 2021-04-06 RX ORDER — ACETAMINOPHEN 325 MG/1
975 TABLET ORAL EVERY 8 HOURS
Status: DISCONTINUED | OUTPATIENT
Start: 2021-04-06 | End: 2021-04-08 | Stop reason: HOSPADM

## 2021-04-06 RX ORDER — ACETAMINOPHEN 325 MG/1
650 TABLET ORAL EVERY 4 HOURS PRN
Qty: 100 TABLET | Refills: 0 | Status: SHIPPED | OUTPATIENT
Start: 2021-04-06 | End: 2021-08-04

## 2021-04-06 RX ORDER — MULTIPLE VITAMINS W/ MINERALS TAB 9MG-400MCG
1 TAB ORAL DAILY
Status: DISCONTINUED | OUTPATIENT
Start: 2021-04-06 | End: 2021-04-08 | Stop reason: HOSPADM

## 2021-04-06 RX ORDER — AMOXICILLIN 250 MG
1 CAPSULE ORAL 2 TIMES DAILY
Status: DISCONTINUED | OUTPATIENT
Start: 2021-04-06 | End: 2021-04-08 | Stop reason: HOSPADM

## 2021-04-06 RX ORDER — PROCHLORPERAZINE MALEATE 10 MG
10 TABLET ORAL EVERY 6 HOURS PRN
Status: DISCONTINUED | OUTPATIENT
Start: 2021-04-06 | End: 2021-04-08 | Stop reason: HOSPADM

## 2021-04-06 RX ORDER — LIDOCAINE 40 MG/G
CREAM TOPICAL
Status: DISCONTINUED | OUTPATIENT
Start: 2021-04-06 | End: 2021-04-06 | Stop reason: HOSPADM

## 2021-04-06 RX ORDER — ACETAMINOPHEN 325 MG/1
975 TABLET ORAL ONCE
Status: COMPLETED | OUTPATIENT
Start: 2021-04-06 | End: 2021-04-06

## 2021-04-06 RX ORDER — PROPOFOL 10 MG/ML
INJECTION, EMULSION INTRAVENOUS CONTINUOUS PRN
Status: DISCONTINUED | OUTPATIENT
Start: 2021-04-06 | End: 2021-04-06

## 2021-04-06 RX ORDER — POLYETHYLENE GLYCOL 3350 17 G/17G
17 POWDER, FOR SOLUTION ORAL DAILY
Status: DISCONTINUED | OUTPATIENT
Start: 2021-04-07 | End: 2021-04-08 | Stop reason: HOSPADM

## 2021-04-06 RX ORDER — SODIUM CHLORIDE, SODIUM LACTATE, POTASSIUM CHLORIDE, CALCIUM CHLORIDE 600; 310; 30; 20 MG/100ML; MG/100ML; MG/100ML; MG/100ML
INJECTION, SOLUTION INTRAVENOUS CONTINUOUS
Status: DISCONTINUED | OUTPATIENT
Start: 2021-04-06 | End: 2021-04-08 | Stop reason: HOSPADM

## 2021-04-06 RX ORDER — KETOROLAC TROMETHAMINE 30 MG/ML
INJECTION, SOLUTION INTRAMUSCULAR; INTRAVENOUS PRN
Status: DISCONTINUED | OUTPATIENT
Start: 2021-04-06 | End: 2021-04-06

## 2021-04-06 RX ORDER — LIDOCAINE 40 MG/G
CREAM TOPICAL
Status: DISCONTINUED | OUTPATIENT
Start: 2021-04-06 | End: 2021-04-08 | Stop reason: HOSPADM

## 2021-04-06 RX ORDER — BUPIVACAINE HYDROCHLORIDE 5 MG/ML
INJECTION, SOLUTION EPIDURAL; INTRACAUDAL
Status: COMPLETED | OUTPATIENT
Start: 2021-04-06 | End: 2021-04-06

## 2021-04-06 RX ORDER — DEXAMETHASONE SODIUM PHOSPHATE 10 MG/ML
INJECTION, SOLUTION INTRAMUSCULAR; INTRAVENOUS
Status: COMPLETED | OUTPATIENT
Start: 2021-04-06 | End: 2021-04-06

## 2021-04-06 RX ORDER — ONDANSETRON 2 MG/ML
4 INJECTION INTRAMUSCULAR; INTRAVENOUS EVERY 30 MIN PRN
Status: DISCONTINUED | OUTPATIENT
Start: 2021-04-06 | End: 2021-04-06 | Stop reason: HOSPADM

## 2021-04-06 RX ORDER — LIDOCAINE HYDROCHLORIDE 20 MG/ML
INJECTION, SOLUTION INFILTRATION; PERINEURAL PRN
Status: DISCONTINUED | OUTPATIENT
Start: 2021-04-06 | End: 2021-04-06

## 2021-04-06 RX ORDER — FLUMAZENIL 0.1 MG/ML
0.2 INJECTION, SOLUTION INTRAVENOUS
Status: DISCONTINUED | OUTPATIENT
Start: 2021-04-06 | End: 2021-04-06 | Stop reason: HOSPADM

## 2021-04-06 RX ORDER — FENTANYL CITRATE 50 UG/ML
25-50 INJECTION, SOLUTION INTRAMUSCULAR; INTRAVENOUS
Status: DISCONTINUED | OUTPATIENT
Start: 2021-04-06 | End: 2021-04-06 | Stop reason: HOSPADM

## 2021-04-06 RX ORDER — ONDANSETRON 4 MG/1
4 TABLET, ORALLY DISINTEGRATING ORAL EVERY 30 MIN PRN
Status: DISCONTINUED | OUTPATIENT
Start: 2021-04-06 | End: 2021-04-06 | Stop reason: HOSPADM

## 2021-04-06 RX ORDER — OXYCODONE HYDROCHLORIDE 5 MG/1
5-10 TABLET ORAL
Qty: 20 TABLET | Refills: 0 | Status: SHIPPED | OUTPATIENT
Start: 2021-04-06 | End: 2021-08-04

## 2021-04-06 RX ORDER — SODIUM CHLORIDE, SODIUM LACTATE, POTASSIUM CHLORIDE, CALCIUM CHLORIDE 600; 310; 30; 20 MG/100ML; MG/100ML; MG/100ML; MG/100ML
INJECTION, SOLUTION INTRAVENOUS CONTINUOUS
Status: DISCONTINUED | OUTPATIENT
Start: 2021-04-06 | End: 2021-04-06 | Stop reason: HOSPADM

## 2021-04-06 RX ORDER — HYDROXYZINE HYDROCHLORIDE 25 MG/1
25 TABLET, FILM COATED ORAL EVERY 6 HOURS PRN
Qty: 30 TABLET | Refills: 0 | Status: SHIPPED | OUTPATIENT
Start: 2021-04-06 | End: 2021-08-04

## 2021-04-06 RX ORDER — ONDANSETRON 4 MG/1
4 TABLET, ORALLY DISINTEGRATING ORAL EVERY 6 HOURS PRN
Status: DISCONTINUED | OUTPATIENT
Start: 2021-04-06 | End: 2021-04-08 | Stop reason: HOSPADM

## 2021-04-06 RX ORDER — NALOXONE HYDROCHLORIDE 0.4 MG/ML
0.2 INJECTION, SOLUTION INTRAMUSCULAR; INTRAVENOUS; SUBCUTANEOUS
Status: DISCONTINUED | OUTPATIENT
Start: 2021-04-06 | End: 2021-04-08 | Stop reason: HOSPADM

## 2021-04-06 RX ORDER — KETOROLAC TROMETHAMINE 15 MG/ML
15 INJECTION, SOLUTION INTRAMUSCULAR; INTRAVENOUS EVERY 6 HOURS
Status: COMPLETED | OUTPATIENT
Start: 2021-04-06 | End: 2021-04-07

## 2021-04-06 RX ORDER — OXYCODONE HYDROCHLORIDE 5 MG/1
5 TABLET ORAL EVERY 4 HOURS PRN
Status: DISCONTINUED | OUTPATIENT
Start: 2021-04-06 | End: 2021-04-08 | Stop reason: HOSPADM

## 2021-04-06 RX ORDER — GLYCINE 1.5 G/100ML
SOLUTION IRRIGATION PRN
Status: DISCONTINUED | OUTPATIENT
Start: 2021-04-06 | End: 2021-04-06 | Stop reason: HOSPADM

## 2021-04-06 RX ORDER — AMOXICILLIN 250 MG
1-2 CAPSULE ORAL 2 TIMES DAILY
Qty: 30 TABLET | Refills: 0 | Status: SHIPPED | OUTPATIENT
Start: 2021-04-06 | End: 2022-01-24

## 2021-04-06 RX ADMIN — ACETAMINOPHEN 975 MG: 325 TABLET, FILM COATED ORAL at 11:56

## 2021-04-06 RX ADMIN — Medication 50 MCG: at 13:19

## 2021-04-06 RX ADMIN — LIDOCAINE HYDROCHLORIDE 20 MG: 20 INJECTION, SOLUTION INFILTRATION; PERINEURAL at 13:40

## 2021-04-06 RX ADMIN — OXYCODONE HYDROCHLORIDE 10 MG: 5 TABLET ORAL at 23:28

## 2021-04-06 RX ADMIN — DOCUSATE SODIUM 50 MG AND SENNOSIDES 8.6 MG 1 TABLET: 8.6; 5 TABLET, FILM COATED ORAL at 21:54

## 2021-04-06 RX ADMIN — SODIUM CHLORIDE, POTASSIUM CHLORIDE, SODIUM LACTATE AND CALCIUM CHLORIDE: 600; 310; 30; 20 INJECTION, SOLUTION INTRAVENOUS at 15:06

## 2021-04-06 RX ADMIN — LIDOCAINE HYDROCHLORIDE 1 ML: 10 INJECTION, SOLUTION EPIDURAL; INFILTRATION; INTRACAUDAL; PERINEURAL at 13:36

## 2021-04-06 RX ADMIN — KETOROLAC TROMETHAMINE 30 MG: 30 INJECTION, SOLUTION INTRAMUSCULAR at 14:27

## 2021-04-06 RX ADMIN — DOCUSATE SODIUM 100 MG: 100 CAPSULE, LIQUID FILLED ORAL at 21:54

## 2021-04-06 RX ADMIN — HYDROMORPHONE HYDROCHLORIDE 0.2 MG: 1 INJECTION, SOLUTION INTRAMUSCULAR; INTRAVENOUS; SUBCUTANEOUS at 21:53

## 2021-04-06 RX ADMIN — PROPOFOL 140 MCG/KG/MIN: 10 INJECTION, EMULSION INTRAVENOUS at 13:43

## 2021-04-06 RX ADMIN — Medication 1 TABLET: at 21:54

## 2021-04-06 RX ADMIN — HYDROMORPHONE HYDROCHLORIDE 0.4 MG: 1 INJECTION, SOLUTION INTRAMUSCULAR; INTRAVENOUS; SUBCUTANEOUS at 23:59

## 2021-04-06 RX ADMIN — DEXAMETHASONE SODIUM PHOSPHATE 4 MG: 10 INJECTION, SOLUTION INTRAMUSCULAR; INTRAVENOUS at 13:19

## 2021-04-06 RX ADMIN — KETOROLAC TROMETHAMINE 15 MG: 15 INJECTION, SOLUTION INTRAMUSCULAR; INTRAVENOUS at 19:22

## 2021-04-06 RX ADMIN — BUPIVACAINE HYDROCHLORIDE IN DEXTROSE 1.6 ML: 7.5 INJECTION, SOLUTION SUBARACHNOID at 13:39

## 2021-04-06 RX ADMIN — TRANEXAMIC ACID 1950 MG: 650 TABLET ORAL at 11:58

## 2021-04-06 RX ADMIN — CLINDAMYCIN IN 5 PERCENT DEXTROSE 900 MG: 18 INJECTION, SOLUTION INTRAVENOUS at 22:32

## 2021-04-06 RX ADMIN — ACETAMINOPHEN 975 MG: 325 TABLET, FILM COATED ORAL at 19:21

## 2021-04-06 RX ADMIN — BUPIVACAINE HYDROCHLORIDE 30 ML: 5 INJECTION, SOLUTION EPIDURAL; INTRACAUDAL; PERINEURAL at 13:19

## 2021-04-06 RX ADMIN — OXYCODONE HYDROCHLORIDE 5 MG: 5 TABLET ORAL at 18:23

## 2021-04-06 RX ADMIN — SODIUM CHLORIDE, POTASSIUM CHLORIDE, SODIUM LACTATE AND CALCIUM CHLORIDE: 600; 310; 30; 20 INJECTION, SOLUTION INTRAVENOUS at 12:08

## 2021-04-06 RX ADMIN — MIDAZOLAM 2 MG: 1 INJECTION INTRAMUSCULAR; INTRAVENOUS at 13:00

## 2021-04-06 RX ADMIN — MULTIPLE VITAMINS W/ MINERALS TAB 1 TABLET: TAB at 16:36

## 2021-04-06 RX ADMIN — CLINDAMYCIN IN 5 PERCENT DEXTROSE 900 MG: 18 INJECTION, SOLUTION INTRAVENOUS at 13:30

## 2021-04-06 ASSESSMENT — MIFFLIN-ST. JEOR: SCORE: 1680.44

## 2021-04-06 ASSESSMENT — ACTIVITIES OF DAILY LIVING (ADL): ADLS_ACUITY_SCORE: 16

## 2021-04-06 NOTE — INTERVAL H&P NOTE
Brief History of Illness:   Michelle Du is a 55 year old female who was admitted for right knee pain    H&P reviewed and patient examined with no new updates from prior exam

## 2021-04-06 NOTE — PHARMACY-ADMISSION MEDICATION HISTORY
Pharmacy -- Admission Medication Reconciliation    Prior to admission (PTA) medications were reviewed and the patient's PTA medication list was updated.    Sources Consulted: Patient    The reliability of this Medication Reconciliation is: Reliability: Reliable    The following significant changes were made:  Removed glucosamine/chondroitin   Added potassium - patient unsure of strenght    In addition, the patient's allergies were reviewed with the patient and updated as follows:   Allergies: Penicillins and Sulfa drugs    The pharmacist has reviewed with the patient that all personal medications should be removed from the building or locked in the belongings safe.  Patient shall only take medications ordered by the physician and administered by the nursing staff.       Medication barriers identified: none noted   Medication adherence concerns: none noted   Understanding of emergency medications: none noted    Glenda Jaime [facility], 4/6/2021,  4:29 PM '

## 2021-04-06 NOTE — ANESTHESIA PROCEDURE NOTES
Lumbar puncture Procedure Note  Pre-Procedure   Staff -        CRNA: Lila Kapadia APRN CRNA       Performed By: CRNA       Location: OR       Procedure Start/Stop Times: 4/6/2021 1:35 PM and 4/6/2021 1:39 PM       Pre-Anesthestic Checklist: patient identified, IV checked, risks and benefits discussed, informed consent, monitors and equipment checked, pre-op evaluation, at physician/surgeon's request and post-op pain management  Timeout:       Correct Patient: Yes        Correct Procedure: Yes        Correct Site: Yes        Correct Position: Yes   Procedure Documentation  Procedure: lumbar puncture       Diagnosis: right knee replacement       Patient Position: sitting       Patient Prep/Sterile Barriers: sterile gloves, mask, patient draped       Skin prep: Chloraprep       Insertion Site: L3-4. (midline approach).       Needle Gauge: 25.        Needle Length (Inches): 3.5        Spinal Needle Type: PencanNo introducer used       # of attempts: 1 and  # of redirects:  1    Assessment/Narrative         Paresthesias: No.       CSF fluid: clear.    Medication(s) Administered   Medication Administration Time: 4/6/2021 1:39 PM

## 2021-04-06 NOTE — ANESTHESIA POSTPROCEDURE EVALUATION
Patient: Michelle Du    Procedure(s):  ARTHROPLASTY, KNEE, TOTAL    Diagnosis:Chronic pain of both knees [M25.561, M25.562, G89.29]  Diagnosis Additional Information: No value filed.    Anesthesia Type:  Spinal    Note:  Disposition: Outpatient   Postop Pain Control: Uneventful            Sign Out: Well controlled pain   PONV: No   Neuro/Psych: Uneventful            Sign Out: Acceptable/Baseline neuro status   Airway/Respiratory: Uneventful            Sign Out: Acceptable/Baseline resp. status   CV/Hemodynamics: Uneventful            Sign Out: Acceptable CV status   Other NRE: NONE   DID A NON-ROUTINE EVENT OCCUR? No         Last vitals:  Vitals:    04/06/21 1520 04/06/21 1525 04/06/21 1530   BP: 104/74 106/66 114/74   Pulse: 81 71 77   Resp: 15 21 10   Temp:      SpO2: 94% 94% 96%       Last vitals prior to Anesthesia Care Transfer:  CRNA VITALS  4/6/2021 1428 - 4/6/2021 1528      4/6/2021             Pulse:  86    Ht Rate:  86    SpO2:  95 %          Electronically Signed By: ROBERTA Cevallos CRNA  April 6, 2021  3:40 PM

## 2021-04-06 NOTE — ANESTHESIA PROCEDURE NOTES
Adductor canal Procedure Note  Pre-Procedure   Staff -        CRNA: Lila Kapadia APRN CRNA       Performed By: CRNA       Location: pre-op       Procedure Start/Stop Times: 4/6/2021 1:05 PM and 4/6/2021 1:19 PM       Pre-Anesthestic Checklist: patient identified, IV checked, site marked, risks and benefits discussed, informed consent, monitors and equipment checked, pre-op evaluation, at physician/surgeon's request and post-op pain management  Timeout:       Correct Patient: Yes        Correct Procedure: Yes        Correct Site: Yes        Correct Position: Yes        Correct Laterality: Yes        Site Marked: Yes  Procedure Documentation  Procedure: Adductor canal       Diagnosis: RIGHT KNEE ARTHROPLASTY       Laterality: right       Patient Position: sitting       Patient Prep/Sterile Barriers: sterile gloves, mask       Skin prep: Chloraprep       Needle Type: insulated       Needle Gauge: 22.        Needle Length (Inches): 6        - Ultrasound guided       - Ultrasound used to identify targeted nerve, plexus, vascular marker, or fascial plane and place a needle adjacent to it in real-time       - Ultrasound was used to visualize the spread of anesthetic in close proximity to the above referenced structure    Assessment/Narrative         The placement was negative for: blood aspirated, painful injection and site bleeding       Paresthesias: No.     Bolus given via. No blood aspirated via catheter.        Secured via.        Insertion/Infusion Method: Single Shot       Injection made incrementally with aspirations every 5 mL.    Medication(s) Administered   Bupivacaine 0.5% PF (Infiltration), 30 mL  Dexamethasone 10 mg/mL PF (Perineural), 4 mg  Dexmedetomidine 4 mcg/mL (Perineural), 50 mcg  Medication Administration Time: 4/6/2021 1:19 PM

## 2021-04-06 NOTE — ANESTHESIA CARE TRANSFER NOTE
Patient: Michelle Du    Procedure(s):  ARTHROPLASTY, KNEE, TOTAL    Diagnosis: Chronic pain of both knees [M25.561, M25.562, G89.29]  Diagnosis Additional Information: No value filed.    Anesthesia Type:   Spinal     Note:    Oropharynx: oropharynx clear of all foreign objects  Level of Consciousness: awake  Oxygen Supplementation: room air    Independent Airway: airway patency satisfactory and stable    Vital Signs Stable: post-procedure vital signs reviewed and stable  Report to RN Given: handoff report given  Patient transferred to: PACU    Handoff Report: Identifed the Patient, Identified the Reponsible Provider, Reviewed the pertinent medical history, Discussed the surgical course, Reviewed Intra-OP anesthesia mangement and issues during anesthesia, Set expectations for post-procedure period and Allowed opportunity for questions and acknowledgement of understanding      Vitals: (Last set prior to Anesthesia Care Transfer)  CRNA VITALS  4/6/2021 1428 - 4/6/2021 1459      4/6/2021             Pulse:  86    Ht Rate:  86    SpO2:  95 %        Electronically Signed By: ROBERTA SCHILLING CRNA  April 6, 2021  2:59 PM

## 2021-04-06 NOTE — PROGRESS NOTES
"[facility] ADMISSION NOTE    Patient admitted to room 318 at approximately 1543 via bed from surgery. Patient was accompanied by spouse.     Verbal SBAR report received from STEVO Forde prior to patient arrival.     Patient trasferred to bed in surgery suite. Patient alert and oriented X 4. Pain is not having any pain.  . Admission vital signs: Blood pressure 119/62, pulse 72, temperature 98.6  F (37  C), temperature source Tympanic, resp. rate 16, height 1.549 m (5' 1\"), weight 114.3 kg (252 lb), last menstrual period 05/25/2018, SpO2 94 %, not currently breastfeeding. Patient and spouse were oriented to plan of care, call light, bed controls, tv, telephone, bathroom and visiting hours.     Risk Assessment    The following safety risks were identified during admission: fall and skin. Yellow risk band applied: YES.     Skin Initial Assessment    This writer admitted this patient and completed a full skin assessment and Adal score in the Adult PCS flowsheet. Appropriate interventions initiated as needed.    Adal Risk Assessment  Sensory Perception: 3-->slightly limited  Moisture: 4-->rarely moist  Activity: 4-->walks frequently  Mobility: 3-->slightly limited  Nutrition: 3-->adequate  Friction and Shear: 3-->no apparent problem  Adal Score: 20    Education    Patient has a [place] to Observation order: No  Observation education completed and documented: N/A      Christina Alejandre RN    "

## 2021-04-06 NOTE — CONSULTS
[facility]  Consult Note - Hospitalist Service     Date of Admission:  4/6/2021  Consult Requested by: Dr. Muhammad  Reason for Consult: postoperative care    Assessment & Plan   Michelle Du is a 55 year old female admitted on 4/6/2021. She is POD#0 for elective R TKA, doing well.     R TKA POD#0.   -pain control  -bowel regimen  -PT/OT  -ambulate as able.  -regular diet    Hx of SVETLANA. Has home CPAP  -RT consult, appreciate care.     No other significant comorbid conditions. Routine postoperative care.      The patient's care was discussed with the Patient.    Roopa Max, DO  [facility]  Contact information available via [facility] Paging/Directory    ______________________________________________________________________    Chief Complaint   R knee pain    History is obtained from the patient    History of Present Illness   Michelle Du is a 55 year old female who presents for elective right total knee arthroplasty.  She is had several years of ongoing knee pain that has not responded to conservative management.  Underwent right TKA with Dr. Lackey on 4/6/2021 with no perioperative or immediate postoperative complications.  Currently doing well.    Review of Systems   The 5 point Review of Systems is negative other than noted in the HPI or here.     Past Medical History    I have reviewed this patient's medical history and updated it with pertinent information if needed.   Past Medical History:   Diagnosis Date     Anemia 3/28/2013     Benign neoplasm of colon     6/19/2014,March 2013-repeat colonoscopy in 2018     Diverticulosis of large intestine without perforation or abscess without bleeding     3/29/2013     Gastritis without bleeding     3/29/2013     Gastro-esophageal reflux disease without esophagitis     No Comments Provided     History of colonic polyps     3/29/2013     Insomnia     No Comments Provided     Postmenopausal bleeding 1/12/2021    Added  automatically from request for surgery [id number]     Urge incontinence     2011       Past Surgical History   I have reviewed this patient's surgical history and updated it with pertinent information if needed.  Past Surgical History:   Procedure Laterality Date     ARTHROSCOPY KNEE BILATERAL       carpel tunnel surgery - bilateral        SECTION      x 2     COLONOSCOPY  2013    3/29/13,Colonoscopy F/U      DILATION AND CURETTAGE, OPERATIVE HYSTEROSCOPY, COMBINED N/A 2021    Procedure: HYSTEROSCOPY, WITH DILATION AND CURETTAGE OF UTERUS;  Surgeon: Caridad Amaya MD;  Location:  OR     ESOPHAGOSCOPY, GASTROSCOPY, DUODENOSCOPY (EGD), COMBINED      3/29 13,EGD     LAPAROSCOPIC TUBAL LIGATION      No Comments Provided       Social History   I have reviewed this patient's social history and updated it with pertinent information if needed.  Social History     Tobacco Use     Smoking status: Never Smoker     Smokeless tobacco: Never Used   Substance Use Topics     Alcohol use: No     Alcohol/week: 0.0 standard drinks     Drug use: No       Family History   I have reviewed this patient's family history and updated it with pertinent information if needed.  Family History   Problem Relation Age of Onset     Diabetes Father         Diabetes     Hypertension Father         Hypertension     Other - See Comments Father         metastatic melanoma     Liver Cancer Father      Heart Disease Mother         Heart Disease,acute MI     Cancer Sister         uterine?     Diabetes Sister      Hypertension Sister      Hypertension Sister      Breast Cancer No family hx of         Cancer-breast       Medications   I have reviewed this patient's current medications  Medications Prior to Admission   Medication Sig Dispense Refill Last Dose     aspirin (ASA) 81 MG EC tablet Take 1 tablet (81 mg) by mouth daily   3/27/2021 at am     calcium carbonate-vitamin D (OSCAL W/D) 500-200 MG-UNIT tablet Take 2 tablets by  mouth daily    3/27/2021 at am     Multiple Vitamins-Minerals (MULTIVITAMIN ADULT EXTRA C) CHEW Take 2 chew tab by mouth daily - gummy formulation   3/27/2021 at am     POTASSIUM PO Take 2 tablets by mouth daily   3/27/2021 at am       Allergies   Allergies   Allergen Reactions     Penicillins Rash     Sulfa Drugs Rash       Physical Exam   Vital Signs: Temp: 99.1  F (37.3  C) Temp src: Tympanic BP: 123/58 Pulse: 66   Resp: 16 SpO2: 95 % O2 Device: None (Room air)    Weight: 252 lbs 0 oz    Constitutional: awake, alert, cooperative, no apparent distress, and appears stated age  Eyes: Lids and lashes normal, sclera clear, conjunctiva normal  ENT: Normocephalic, without obvious abnormality, atraumatic, , external ears without lesions, oral pharynx with moist mucous membranes, and good dentition.  Respiratory: No increased work of breathing, good air exchange, clear to auscultation bilaterally, no crackles or wheezing  Cardiovascular: Normal apical impulse, regular rate and rhythm, normal S1 and S2, no S3 or S4, and no murmur noted  GI: No scars, normal bowel sounds, soft, non-distended, non-tender,  Skin: no bruising or bleeding  Musculoskeletal: There is no redness, warmth, or swelling of the joints. R knee with cooling pack. Paresthesia R foot. Normal sensation and pulses B/L.    Data

## 2021-04-06 NOTE — BRIEF OP NOTE
[facility]    Brief Operative Note    Pre-operative diagnosis: Chronic pain of both knees [M25.561, M25.562, G89.29]  Post-operative diagnosis Same as pre-operative diagnosis    Procedure: Procedure(s):  ARTHROPLASTY, KNEE, TOTAL  Surgeon: Surgeon(s) and Role:     * Mario Muhammad MD - Primary     * Bony Bradley PA-C - Assisting  Anesthesia: Spinal   Estimated blood loss: Less than 50 ml  Drains: None  Specimens: * No specimens in log *  Findings:   None.  Complications: None.  Implants:   Implant Name Type Inv. Item Serial No.  Lot No. LRB No. Used Action   BONE CEMENT SIMPLEX W/TOBRAMYCIN 6197-9-001 Cement, Bone BONE CEMENT SIMPLEX W/TOBRAMYCIN 6197-9-001  [employer] [id number] Right 2 Implanted   Persona Knee system poly patella     [employer]INO [id number] Right 1 Implanted   Persona Articular surface Right 11 mm height    [employer] [id number] Right 1 Implanted   IMP TIBIAL ZIM PSN NP STM 5DEG KAYLIE TAYLOR -309-02 Total Joint Component/Insert IMP TIBIAL ZIM PSN NP STM 5DEG KAYLIE TAYLOR -888-02  [employer] [id number] Right 1 Implanted   IMP COMP FEM ZIM PSN PS CMT STD SZ 6 RT  -354-02 Total Joint Component/Insert IMP COMP FEM ZIM PSN PS CMT STD SZ 6 RT  -796-02  [employer] [id number] Right 1 Implanted

## 2021-04-06 NOTE — ANESTHESIA PREPROCEDURE EVALUATION
Anesthesia Pre-Procedure Evaluation    Patient: Michelle Du   MRN: [id number] : 1965        Preoperative Diagnosis: Chronic pain of both knees [M25.561, M25.562, G89.29]   Procedure : Procedure(s):  ARTHROPLASTY, KNEE, TOTAL     Past Medical History:   Diagnosis Date     Anemia 3/28/2013     Benign neoplasm of colon     2014,2013-repeat colonoscopy in 2018     Diverticulosis of large intestine without perforation or abscess without bleeding     3/29/2013     Gastritis without bleeding     3/29/2013     Gastro-esophageal reflux disease without esophagitis     No Comments Provided     History of colonic polyps     3/29/2013     Insomnia     No Comments Provided     Postmenopausal bleeding 2021    Added automatically from request for surgery [id number]     Urge incontinence     2011      Past Surgical History:   Procedure Laterality Date     ARTHROSCOPY KNEE BILATERAL       carpel tunnel surgery - bilateral        SECTION      x 2     COLONOSCOPY  2013    3/29/13,Colonoscopy F/U      DILATION AND CURETTAGE, OPERATIVE HYSTEROSCOPY, COMBINED N/A 2021    Procedure: HYSTEROSCOPY, WITH DILATION AND CURETTAGE OF UTERUS;  Surgeon: Caridad Amaya MD;  Location:  OR     ESOPHAGOSCOPY, GASTROSCOPY, DUODENOSCOPY (EGD), COMBINED      3/29 13,EGD     LAPAROSCOPIC TUBAL LIGATION      No Comments Provided      Allergies   Allergen Reactions     Penicillins Rash     Sulfa Drugs Rash      Social History     Tobacco Use     Smoking status: Never Smoker     Smokeless tobacco: Never Used   Substance Use Topics     Alcohol use: No     Alcohol/week: 0.0 standard drinks      Wt Readings from Last 1 Encounters:   21 114.3 kg (252 lb)        Anesthesia Evaluation   Pt has had prior anesthetic. Type: Regional and General.        ROS/MED HX  ENT/Pulmonary:     (+) sleep apnea, uses CPAP,     Neurologic:  - neg neurologic ROS     Cardiovascular:  - neg cardiovascular ROS      METS/Exercise Tolerance: 3 - Able to walk 1-2 blocks without stopping    Hematologic:  - neg hematologic  ROS     Musculoskeletal:  - neg musculoskeletal ROS     GI/Hepatic:     (+) GERD, Asymptomatic on medication,     Renal/Genitourinary:  - neg Renal ROS     Endo:  - neg endo ROS   (+) Obesity,     Psychiatric/Substance Use:  - neg psychiatric ROS     Infectious Disease:  - neg infectious disease ROS     Malignancy:  - neg malignancy ROS     Other:  - neg other ROS          Physical Exam    Airway        Mallampati: II   TM distance: > 3 FB   Neck ROM: full   Mouth opening: > 3 cm    Respiratory Devices and Support         Dental  no notable dental history         Cardiovascular   cardiovascular exam normal          Pulmonary   pulmonary exam normal                OUTSIDE LABS:  CBC:   Lab Results   Component Value Date    WBC 6.1 01/27/2021    WBC 8.0 11/28/2018    HGB 13.5 03/12/2021    HGB 13.2 01/27/2021    HCT 41.4 01/27/2021    HCT 41.5 11/28/2018     01/27/2021     11/28/2018     BMP:   Lab Results   Component Value Date     01/07/2021     11/28/2018    POTASSIUM 4.3 01/07/2021    POTASSIUM 4.4 11/28/2018    CHLORIDE 105 01/07/2021    CHLORIDE 104 11/28/2018    CO2 28 01/07/2021    CO2 29 11/28/2018    BUN 13 01/07/2021    BUN 22 11/28/2018    CR 0.77 01/07/2021    CR 0.65 11/28/2018    GLC 97 01/07/2021    GLC 88 11/28/2018     COAGS:   Lab Results   Component Value Date    INR 1.2 03/25/2013     POC: No results found for: BGM, HCG, HCGS  HEPATIC:   Lab Results   Component Value Date    ALBUMIN 3.9 04/14/2017    PROTTOTAL 7.0 04/14/2017    ALKPHOS 77 04/14/2017    BILITOTAL 0.7 04/14/2017     OTHER:   Lab Results   Component Value Date    ELAN 9.6 01/07/2021       Anesthesia Plan    ASA Status:  2   NPO Status:  NPO Appropriate    Anesthesia Type: Spinal (adductor canal block).              Consents    Anesthesia Plan(s) and associated risks, benefits, and realistic alternatives  discussed. Questions answered and patient/representative(s) expressed understanding.     - Discussed with:  Patient      - Extended Intubation/Ventilatory Support Discussed: No.      - Patient is DNR/DNI Status: No    Use of blood products discussed: No .     Postoperative Care    Pain management: IV analgesics, Multi-modal analgesia, Peripheral nerve block (Single Shot), Neuraxial analgesia.   PONV prophylaxis: Ondansetron (or other 5HT-3)     Comments:                ROBERTA SCHILLING CRNA

## 2021-04-07 ENCOUNTER — APPOINTMENT (OUTPATIENT)
Dept: OCCUPATIONAL THERAPY | Facility: OTHER | Age: 56
DRG: 470 | End: 2021-04-07
Attending: ORTHOPAEDIC SURGERY
Payer: COMMERCIAL

## 2021-04-07 ENCOUNTER — APPOINTMENT (OUTPATIENT)
Dept: PHYSICAL THERAPY | Facility: OTHER | Age: 56
DRG: 470 | End: 2021-04-07
Attending: ORTHOPAEDIC SURGERY
Payer: COMMERCIAL

## 2021-04-07 LAB — HGB BLD-MCNC: 12.7 G/DL (ref 11.7–15.7)

## 2021-04-07 PROCEDURE — 97116 GAIT TRAINING THERAPY: CPT | Mod: GP

## 2021-04-07 PROCEDURE — 999N000157 HC STATISTIC RCP TIME EA 10 MIN

## 2021-04-07 PROCEDURE — 85018 HEMOGLOBIN: CPT | Performed by: ORTHOPAEDIC SURGERY

## 2021-04-07 PROCEDURE — 250N000013 HC RX MED GY IP 250 OP 250 PS 637: Performed by: ORTHOPAEDIC SURGERY

## 2021-04-07 PROCEDURE — 97530 THERAPEUTIC ACTIVITIES: CPT | Mod: GO | Performed by: OCCUPATIONAL THERAPIST

## 2021-04-07 PROCEDURE — 250N000009 HC RX 250: Performed by: ORTHOPAEDIC SURGERY

## 2021-04-07 PROCEDURE — 97110 THERAPEUTIC EXERCISES: CPT | Mod: GP

## 2021-04-07 PROCEDURE — 97165 OT EVAL LOW COMPLEX 30 MIN: CPT | Mod: GO | Performed by: OCCUPATIONAL THERAPIST

## 2021-04-07 PROCEDURE — 97530 THERAPEUTIC ACTIVITIES: CPT | Mod: GP

## 2021-04-07 PROCEDURE — 120N000001 HC R&B MED SURG/OB

## 2021-04-07 PROCEDURE — 99231 SBSQ HOSP IP/OBS SF/LOW 25: CPT | Performed by: FAMILY MEDICINE

## 2021-04-07 PROCEDURE — 250N000011 HC RX IP 250 OP 636: Performed by: ORTHOPAEDIC SURGERY

## 2021-04-07 PROCEDURE — 97535 SELF CARE MNGMENT TRAINING: CPT | Mod: GO | Performed by: OCCUPATIONAL THERAPIST

## 2021-04-07 PROCEDURE — 36415 COLL VENOUS BLD VENIPUNCTURE: CPT | Performed by: ORTHOPAEDIC SURGERY

## 2021-04-07 RX ADMIN — Medication 1 TABLET: at 21:22

## 2021-04-07 RX ADMIN — Medication 1 TABLET: at 10:23

## 2021-04-07 RX ADMIN — ASPIRIN 325 MG: 325 TABLET, COATED ORAL at 08:15

## 2021-04-07 RX ADMIN — ASPIRIN 325 MG: 325 TABLET, COATED ORAL at 19:21

## 2021-04-07 RX ADMIN — KETOROLAC TROMETHAMINE 15 MG: 15 INJECTION, SOLUTION INTRAMUSCULAR; INTRAVENOUS at 08:15

## 2021-04-07 RX ADMIN — POLYETHYLENE GLYCOL 3350 17 G: 17 POWDER, FOR SOLUTION ORAL at 10:24

## 2021-04-07 RX ADMIN — KETOROLAC TROMETHAMINE 15 MG: 15 INJECTION, SOLUTION INTRAMUSCULAR; INTRAVENOUS at 14:47

## 2021-04-07 RX ADMIN — KETOROLAC TROMETHAMINE 15 MG: 15 INJECTION, SOLUTION INTRAMUSCULAR; INTRAVENOUS at 02:57

## 2021-04-07 RX ADMIN — ACETAMINOPHEN 975 MG: 325 TABLET, FILM COATED ORAL at 21:21

## 2021-04-07 RX ADMIN — DOCUSATE SODIUM 50 MG AND SENNOSIDES 8.6 MG 1 TABLET: 8.6; 5 TABLET, FILM COATED ORAL at 10:23

## 2021-04-07 RX ADMIN — MULTIPLE VITAMINS W/ MINERALS TAB 1 TABLET: TAB at 10:23

## 2021-04-07 RX ADMIN — CLINDAMYCIN IN 5 PERCENT DEXTROSE 900 MG: 18 INJECTION, SOLUTION INTRAVENOUS at 06:13

## 2021-04-07 RX ADMIN — OXYCODONE HYDROCHLORIDE 10 MG: 5 TABLET ORAL at 08:49

## 2021-04-07 RX ADMIN — DOCUSATE SODIUM 100 MG: 100 CAPSULE, LIQUID FILLED ORAL at 21:22

## 2021-04-07 RX ADMIN — ACETAMINOPHEN 975 MG: 325 TABLET, FILM COATED ORAL at 12:24

## 2021-04-07 RX ADMIN — DOCUSATE SODIUM 100 MG: 100 CAPSULE, LIQUID FILLED ORAL at 10:23

## 2021-04-07 RX ADMIN — DOCUSATE SODIUM 50 MG AND SENNOSIDES 8.6 MG 1 TABLET: 8.6; 5 TABLET, FILM COATED ORAL at 21:22

## 2021-04-07 RX ADMIN — ACETAMINOPHEN 975 MG: 325 TABLET, FILM COATED ORAL at 03:03

## 2021-04-07 ASSESSMENT — ACTIVITIES OF DAILY LIVING (ADL)
ADLS_ACUITY_SCORE: 16

## 2021-04-07 NOTE — PROGRESS NOTES
04/07/21 0808   Quick Adds   Type of Visit Initial Occupational Therapy Evaluation   Living Environment   People in home spouse   Current Living Arrangements house   Transportation Anticipated family or friend will provide   Self-Care   Usual Activity Tolerance excellent   Current Activity Tolerance fair   Disability/Function   Hearing Difficulty or Deaf no   Concentrating, Remembering or Making Decisions Difficulty no   Difficulty Communicating no   Difficulty Eating/Swallowing no   Dressing/Bathing Difficulty no   Toileting issues no   Doing Errands Independently Difficulty (such as shopping) yes   Fall history within last six months no   Cognitive Status Examination   Orientation Status orientation to person, place and time   Affect/Mental Status (Cognitive) WNL   Follows Commands WFL   Bed Mobility   Bed Mobility supine-sit   Supine-Sit [place]ron (Bed Mobility) minimum assist (75% patient effort)   Assistive Device (Bed Mobility) bed rails   Transfers   Transfers sit-stand transfer;toilet transfer   Sit-Stand Transfer   Sit-Stand [place]ron (Transfers) contact guard   Bathing Assessment/Intervention   [place]ron Level (Bathing) minimum assist (75% patient effort)   Upper Body Dressing Assessment/Training   [place]ron Level (Upper Body Dressing) set up;supervision   Lower Body Dressing Assessment/Training   [place] Level (Lower Body Dressing) minimum assist (75% patient effort)   Grooming Assessment/Training   Dendron Level (Grooming) set up;supervision   Clinical Impression   Criteria for Skilled Therapeutic Interventions Met (OT) yes   OT Diagnosis weakness   OT Problem List-Impairments impacting ADL activity tolerance impaired;mobility;post-surgical precautions   Assessment of Occupational Performance 1-3 Performance Deficits   Identified Performance Deficits self cares, mobility   Planned Therapy Interventions (OT) ADL retraining;bed mobility training   Clinical Decision Making  Complexity (OT) low complexity   Therapy Frequency (OT) Daily   Predicted Duration of Therapy 1-2 days   Risk & Benefits of therapy have been explained patient   OT Discharge Planning    OT Discharge Recommendation (DC Rec) Home with assist   OT Rationale for DC Rec pt is close to baseline level of function, will not need further OT upon DC as spouse will assist as needed.   OT Brief overview of current status  min assist overall for self cares, bed mobility; ambulates with FWW and CGA   Skin WDL   Skin WDL WDL

## 2021-04-07 NOTE — PROGRESS NOTES
:    Patient is from home with spouse.  Patient would like to have outpatient therapy with [facility].   will continue to follow.  Anticipated discharge tomorrow.    JONNIE Thompson on 4/7/2021 at 10:31 AM

## 2021-04-07 NOTE — PROGRESS NOTES
[facility]    Hospitalist Progress Note      Assessment & Plan   Michelle Du is a 55 year old female who was admitted on 4/6/2021 for elective R TKA.     R TKA POD#1. Doing well.   -pain control  -bowel regimen  -PT/OT  -ambulate as able.  -regular diet     Hx of SVETLANA. Has home CPAP  -RT consult, appreciate care.     Hyperlipidemia.   -continue home meds    DVT Prophylaxis: 325mg ASA  Code Status: Full Code    Roopa Max    Interval History   Doing great today. Urinating fine. Passing gas, no bowel movement yet. Pain controlled. No acute concerns.     -Data reviewed today: I reviewed all new labs and imaging results over the last 24 hours. I personally reviewed no images or EKG's today.    Physical Exam   Temp: 98  F (36.7  C) Temp src: Tympanic BP: 113/62 Pulse: 74   Resp: 16 SpO2: 98 % O2 Device: None (Room air)    Vitals:    04/06/21 1148   Weight: 114.3 kg (252 lb)     Vital Signs with Ranges  Temp:  [98  F (36.7  C)-99.3  F (37.4  C)] 98  F (36.7  C)  Pulse:  [66-94] 74  Resp:  [10-26] 16  BP: ()/() 113/62  FiO2 (%):  [2 %] 2 %  SpO2:  [93 %-100 %] 98 %  I/O last 3 completed shifts:  In: 900 [I.V.:900]  Out: 250 [Urine:250]    Constitutional: AAO, NAD. Appears well.   Respiratory: CTA B/L -w/r/r  Cardiovascular: RR -murmur  GI: Abd soft, NT, ND  Skin/Integumen: warm, dry.   MSK: R knee with dressing in place, did not remove.   Other:      Medications     lactated ringers 75 mL/hr at 04/06/21 1635       acetaminophen  975 mg Oral Q8H     aspirin  325 mg Oral BID w/meals     calcium carbonate-vitamin D  1 tablet Oral BID     docusate sodium  100 mg Oral BID     ketorolac  15 mg Intravenous Q6H     multivitamin w/minerals  1 tablet Oral Daily     polyethylene glycol  17 g Oral Daily     senna-docusate  1 tablet Oral BID     sodium chloride (PF)  3 mL Intracatheter Q8H       Data   Recent Labs   Lab 04/07/21  0515   HGB 12.7       Recent Results (from the past 24 hour(s))    XR Knee Port Right 1/2 Views    Narrative    XR KNEE PORT RT 1/2 VW    HISTORY: 55 years Female Post-Op Total Knee    COMPARISON: None    TECHNIQUE: 2 views right knee    FINDINGS: Patient status post right total knee arthroplasty. Skin  closure staples are present. There is no evidence of fracture.  Alignment is anatomic. Soft tissue air is present      Impression    IMPRESSION: Postop right total knee arthroplasty. No evidence of  complication.    YANICK GREENBERG MD

## 2021-04-07 NOTE — PROGRESS NOTES
SAFETY CHECKLIST  ID Bands and Risk clasps correct and in place (DNR, Fall risk, Allergy, Latex, Limb):  Yes  All Lines Reconciled and labeled correctly: Yes  Whiteboard updated:Yes  Environmental interventions (bed/chair alarm on, call light, side rails, restraints, sitter....): Yes  Verify Tele #: n/a

## 2021-04-07 NOTE — PROGRESS NOTES
Incentive Spirometry education completed.  Pt goal 1500 mls.  Pt achieved 2000 mls.  Pt instructed to perform 10/hr while awake with at least one deep breath and cough per hour until able to perform baseline activity.  RT will follow and re-assess as need.      RT Rossy on 4/6/2021 at 8:06 PM

## 2021-04-07 NOTE — OP NOTE
Procedure Date: 04/06/2021      PREOPERATIVE DIAGNOSIS:  Right knee degenerative arthrosis.      POSTOPERATIVE DIAGNOSIS:  Right knee degenerative arthrosis.      PROCEDURE:  Right total knee replacement, Jericho Persona posterior stabilized.      ASSISTANT:  Bony Bradley PA-C      ANESTHESIA:  Spinal with block.      COMPLICATIONS:  Without.      IMPLANTS:  Jericho Persona size 6 femoral component, size D tibial baseplate, 11 PS poly and 29 patella.      INDICATIONS:  Ms. Du is a 55-year-old with history of right knee pain, progressive in nature, unresponsive to conservative measures.  Recommendation was for total joint reconstruction.  The patient did elect to proceed following a discussion of the risks and the benefits.      OPERATIVE PROCEDURE:  The patient was taken to operative suite, administered anesthesia.  Following spinalization, placed in standard supine position.  Right lower extremity prepped and draped in normal sterile fashion.  Preoperative antibiotics administered and timeout was called.  At this point in time, a midline incision was made.  Dissection carried down to extensor mechanism.  Medial parapatellar approach was then performed.  Medial and lateral menisci removed.  Proper retractors were placed about the knee.  Intraoperative start point was used to gain access.  We set our distal valgus cut at 5 degrees, performed our distal valgus cut, sized the patient up for a size 6, pinned the formed cut block in 3 degrees of external rotation using posterior referencing and then performed the anterior cut, posterior cut and chamfer cuts on the femoral cut block at this point in time.  After preparation there, we used the extramedullary guide for tibial resection positioned in proper varus and valgus plane as well as anterior and posterior slope.  After that was complete, we then checked our gap balance, had good gap balance with a 10 mm block.  We then sized the patient's tibia for a size D,  centered this over the medial one-third of the tubercle, secured this down, used the drill followed by a keel punch, medialized the femoral component, cut our box for the PS and trialed both 10 and 11 PS poly.  The 11 felt that it had the best stability as far as range of motion, stability and tracking at this time.  Following completion of that, we then resurfaced our patella and drilled for a 29 mm component.  All trial components were removed.  Final meniscal resections were carried out.  Hemostasis achieved in the posterior capsule, medial and lateral gutters, as well as anteriorly.  Mixed cement with tobramycin, applied cement to the tibia following implantation of tibial component, cement application for femur, implantation of the femoral component, implantation of polyethylene and then cement application backside of the patella and implantation of the patellar component.  Once the cement had cured, we copiously irrigated the knee itself, closed our retinacular incision with #1 Vicryl, followed by 2-0 Vicryl, skin staples, Dermabond and Aquacel dressing.  The patient then was transferred to the recovery room in stable condition.      POSTOPERATIVE PLAN:  Staples out here at 14 days postoperative.  Outpatient therapy at [facility] per request.  Oxycodone for pain management and aspirin 325 p.o. b.i.d. for DVT prophylaxis as well.      A skilled first assistant was necessary for position, intraoperative decision making, retraction and exposure during the procedure.      Furthermore, a skilled first assistant was necessary to complete the procedure in a technically safe and efficient manner.         JACI GUERRA MD             D: 2021   T: 2021   MT: [id number]      Name:     ASH GALVAN   MRN:      [id number]        Account:        [id number]   :      1965           Procedure Date: 2021      Document: [id number]

## 2021-04-07 NOTE — PLAN OF CARE
Patient was pleasant. Vitals are stable. BP was slightly elevated. The patient was having moderate to severe pain with little relief from from pain meds. Patient received multiple pain medications, see the MAR for details. Patient received 10mg of oxycodone at 2330 and then 0.4mg of dilaudid at 2359. The patient started to have relief from her pain. Will continue to monitor.

## 2021-04-07 NOTE — PROGRESS NOTES
04/07/21 1533   Signing Clinician's Name / Credentials   Signing clinician's name / credentials Deanna Sandoval PTA   Quick Adds   Rehab Discipline PT   PT Assistant Visit Number 1   Quick Adds Mobility;Therapeutic Exercise   Range of Motion Knee   ROM: Right Knee   Extension/Flexion - degrees 12-75   ROM Type Extension/Flexion AAROM   Pathologic End Feel Extension/Flexion soft pathologic end feel   Functional Transfer Training   Symptoms Noted During/After Treatment increased pain   Treatment Detail SBA/CGA for t/f's  (SBA/CGA for sit/stand t/f's with good technique)   Gait Training   Symptoms Noted During/After Treatment (Gait Training) increased pain   Distance in Feet (Required for LE Total Joints) 100'  (75')   [place] Level (Gait Training) contact guard   Physical Assistance Level (Gait Training) 1 person assist   Weight Bearing (Gait Training) weight-bearing as tolerated   Assistive Device (Gait Training) rolling walker   Gait Analysis Deviations decreased aziza;increased time in double stance;decreased step length;decreased swing-to-stance ratio   Therapeutic Activity   Symptoms Noted During/After Treatment None   Treatment Detail bed mobility CGA/Min A   Therapeutic Exercise   Symptoms Noted During/After Treatment increased pain   Treatment Detail TKA ex per protocol   PT Discharge Planning    PT Discharge Recommendation (DC Rec) home with outpatient physical therapy   PT Brief overview of current status  SBA/CGA for functional mobility, good understanding of TKA ex.   Additional Documentation   PT Plan Continue PT   Total Session Time   Total Session Time (minutes) 45 minutes

## 2021-04-08 ENCOUNTER — APPOINTMENT (OUTPATIENT)
Dept: OCCUPATIONAL THERAPY | Facility: OTHER | Age: 56
DRG: 470 | End: 2021-04-08
Attending: ORTHOPAEDIC SURGERY
Payer: COMMERCIAL

## 2021-04-08 ENCOUNTER — APPOINTMENT (OUTPATIENT)
Dept: PHYSICAL THERAPY | Facility: OTHER | Age: 56
DRG: 470 | End: 2021-04-08
Attending: ORTHOPAEDIC SURGERY
Payer: COMMERCIAL

## 2021-04-08 VITALS
WEIGHT: 252 LBS | HEIGHT: 61 IN | RESPIRATION RATE: 16 BRPM | HEART RATE: 82 BPM | SYSTOLIC BLOOD PRESSURE: 145 MMHG | OXYGEN SATURATION: 99 % | TEMPERATURE: 99.3 F | DIASTOLIC BLOOD PRESSURE: 73 MMHG | BODY MASS INDEX: 47.58 KG/M2

## 2021-04-08 LAB — HGB BLD-MCNC: 11.7 G/DL (ref 11.7–15.7)

## 2021-04-08 PROCEDURE — 97116 GAIT TRAINING THERAPY: CPT | Mod: GP

## 2021-04-08 PROCEDURE — 97535 SELF CARE MNGMENT TRAINING: CPT | Mod: GO | Performed by: OCCUPATIONAL THERAPIST

## 2021-04-08 PROCEDURE — 97530 THERAPEUTIC ACTIVITIES: CPT | Mod: GO | Performed by: OCCUPATIONAL THERAPIST

## 2021-04-08 PROCEDURE — 250N000013 HC RX MED GY IP 250 OP 250 PS 637: Performed by: ORTHOPAEDIC SURGERY

## 2021-04-08 PROCEDURE — 99238 HOSP IP/OBS DSCHRG MGMT 30/<: CPT | Performed by: FAMILY MEDICINE

## 2021-04-08 PROCEDURE — 85018 HEMOGLOBIN: CPT | Performed by: ORTHOPAEDIC SURGERY

## 2021-04-08 PROCEDURE — 97530 THERAPEUTIC ACTIVITIES: CPT | Mod: GP

## 2021-04-08 PROCEDURE — 97110 THERAPEUTIC EXERCISES: CPT | Mod: GP

## 2021-04-08 PROCEDURE — 36415 COLL VENOUS BLD VENIPUNCTURE: CPT | Performed by: ORTHOPAEDIC SURGERY

## 2021-04-08 RX ADMIN — OXYCODONE HYDROCHLORIDE 10 MG: 5 TABLET ORAL at 10:08

## 2021-04-08 RX ADMIN — ASPIRIN 325 MG: 325 TABLET, COATED ORAL at 08:13

## 2021-04-08 RX ADMIN — ACETAMINOPHEN 975 MG: 325 TABLET, FILM COATED ORAL at 03:32

## 2021-04-08 ASSESSMENT — ACTIVITIES OF DAILY LIVING (ADL)
ADLS_ACUITY_SCORE: 16

## 2021-04-08 NOTE — PHARMACY - DISCHARGE MEDICATION RECONCILIATION AND EDUCATION
Pharmacy:  Discharge Counseling and Medication Reconciliation    Michelle Du  [address] [facility] [address]  [phone] (home)   55 year old female  PCP: Cristal Naranjo    Allergies: Penicillins and Sulfa drugs    Discharge Counseling:    Pharmacist met with patient (and/or family) today to review the medication portion of the After Visit Summary (with an emphasis on NEW medications) and to address patient's questions/concerns.    Summary of Education: Spoke to patient about short-term post-op medications. Discussed increase of aspirin 325 bid for next 30 days or until follow-up. Talked about oxycodone and to use sparingly if tylenol is controlling pain. Also advised to use the senna-docusate while taking oxycodone to help with constipation. Discussed hydroxyzine use for itching at surgical site and that both the hydroxyzine and oxycodone can make her drowsy.    Materials Provided:  MedCounselor sheets printed from Clinical Pharmacology on: oxycodone    Discharge Medication Reconciliation:    It has been determined that the patient has an adequate supply of medications available or which can be obtained from the patient's preferred pharmacy, which HE/SHE has confirmed as: [facility]    Thank you for the consult.    Tamiko Alfonso [facility]........April 8, 2021 8:45 AM

## 2021-04-08 NOTE — DISCHARGE SUMMARY
[facility]    Discharge Summary  Hospitalist    Date of Admission:  4/6/2021  Date of Discharge:  4/8/2021  Discharging Provider: Roopa Max  Date of Service (when I saw the patient): 04/08/21    Discharge Diagnoses   Principal Problem:    Chronic pain of both knees (2/4/2021). POA  S/p R TKA  Active Problems:    Hyperlipidemia (10/1/2010)      History of Present Illness   Michelle Du is an 55 year old female who presented with B/L knee pain for elective R TKA.     Hospital Course   Michelle Du was admitted on 4/6/2021.  The following problems were addressed during her hospitalization:    R TKA POD on 4/6/21 with Dr. Muhammad. No perioperative or postoperative complications. Doing well. Plan to discharge home with PT/OT.      Hx of SVETLANA. Has home CPAP     Hyperlipidemia. Continue home meds.     Roopa Max, DO    Significant Results and Procedures   R TKA on 4/6/21 with Dr. Muhammad.     Pending Results   These results will be followed up by PCP, ortho  Unresulted Labs Ordered in the Past 30 Days of this Admission     No orders found from 3/7/2021 to 4/7/2021.          Code Status   Full Code       Primary Care Physician   Cristal Naranjo    Physical Exam   Temp: 99.3  F (37.4  C) Temp src: Tympanic BP: (!) 145/73 Pulse: 82   Resp: 16 SpO2: 99 % O2 Device: None (Room air)    Vitals:    04/06/21 1148   Weight: 114.3 kg (252 lb)     Vital Signs with Ranges  Temp:  [96.4  F (35.8  C)-99.3  F (37.4  C)] 99.3  F (37.4  C)  Pulse:  [68-82] 82  Resp:  [16-18] 16  BP: (113-145)/(54-75) 145/73  SpO2:  [97 %-99 %] 99 %  I/O last 3 completed shifts:  In: 130 [P.O.:120; I.V.:10]  Out: 1150 [Urine:1150]    Constitutional: AAO, NAD. Appears well.   Respiratory:     CTA B/L -w/r/r  Cardiovascular: RR -murmur  GI: Abd soft, NT, ND  Skin/Integumen: warm, dry.   MSK: R knee with dressing in place, did not remove.     Discharge Disposition   Discharged to home  Condition at discharge: Stable    Consultations  "This Hospital Stay   HOSPITALIST IP CONSULT  PHYSICAL THERAPY ADULT IP CONSULT  OCCUPATIONAL THERAPY ADULT IP CONSULT  SOCIAL WORK IP CONSULT    Time Spent on this Encounter   I, Roopa Max DO, personally saw the patient today and spent less than or equal to 30 minutes discharging this patient.    Discharge Orders      Reason for your hospital stay    Right total knee replacement     When to call - Contact Surgeon Team    You may experience symptoms that require follow-up before your scheduled appointment. Refer to the \"Stoplight Tool\" for instructions on when to contact your Surgeon Team if you are concerned about pain control, blood clots, constipation, or if you are unable to urinate.     When to call - Reach out to Urgent Care    If you are not able to reach your Surgeon Team and you need immediate care, go to the Orthopedic Walk-in Clinic or Urgent Care at your Surgeon's office.  Do NOT go to the Emergency Room unless you have shortness of breath, chest pain, or other signs of a medical emergency.     When to call - Reasons to Call 911    Call 911 immediately if you experience sudden-onset chest pain, arm weakness/numbness, slurred speech, or shortness of breath     Discharge Instruction - Breathing exercises    Perform breathing exercises using your Incentive Spirometer 10 times per hour while awake for 2 weeks.     Symptoms - Fever Management    A low grade fever can be expected after surgery.  Use acetaminophen (TYLENOL) as needed for fever management.  Contact your Surgeon Team if you have a fever greater than 101.5 F, chills, and/or night sweats.     Symptoms - Constipation management    Constipation (hard, dry bowel movements) is expected after surgery due to the combination of being less active, the anesthetic, and the opioid pain medication.  You can do the following to help reduce constipation:  ~  FLUIDS:  Drink clear liquids (water or Gatorade), or juice (apple/prune).  ~  DIET:  Eat a fiber " rich diet.    ~  ACTIVITY:  Get up and move around several times a day.  Increase your activity as you are able.  MEDICATIONS:  Reduce the risk of constipation by starting medications before you are constipated.  You can take Miralax   (1 packet as directed) and/or a stool softener (Senokot 1-2 tablets 1-2 times a day).  If you already have constipation and these medications are not working, you can get magnesium citrate and use as directed.  If you continue to have constipation you can try an over the counter suppository or enema.  Call your Surgeon Team if it has been greater than 3 days since your last bowel movement.     Symptoms - Reduced Urine Output    Changes in the amount of fluids you drank before and after surgery may result in problems urinating.  It is important to stay well-hydrated after surgery and drink plenty of water. If it has been greater than 8 hours since you have urinated despite drinking plenty of water, call your Surgeon Team.     Activity - Exercises to prevent blood clots    Unless otherwise directed by your Surgeon team, perform the following exercises at least three times per day for the first four weeks after surgery to prevent blood clots in your legs: 1) Point and flex your feet (Ankle Pumps), 2) Move your ankle around in big circles, 3) Wiggle your toes, 4) Walk, even for short distances, several times a day, will help decrease the risk of blood clots.     Comfort and Pain Management - Pain after Surgery    Pain after surgery is normal and expected.  You will have some amount of pain for several weeks after surgery.  Your pain will improve with time.  There are several things you can do to help reduce your pain including: rest, ice, elevation, and using pain medications as needed. Contact your Surgeon Team if you have pain that persists or worsens after surgery despite rest, ice, elevation, and taking your medication(s) as prescribed. Contact your Surgeon Team if you have new  numbness, tingling, or weakness in your operative extremity.     Comfort and Pain Management - Swelling after Surgery    Swelling and/or bruising of the surgical extremity is common and may persist for several months after surgery. In addition to frequent icing and elevation, gentle compressive support with an ACE wrap or tubigrip may help with swelling. Apply compression regularly, removing at least twice daily to perform skin checks. Contact your Surgeon Team if your swelling increases and is NOT associated with an increase in your activity level, or if your swelling increases and is associated with redness and pain.     Comfort and Pain Management - Cold therapy    Ice can be used to control swelling and discomfort after surgery. Place a thin towel over your operative site and apply the ice pack overtop. Leave ice pack in place for 20 minutes, then remove for 20 minutes. Repeat this 20 minutes on/20 minutes off routine as often as tolerated.     Medication Instructions - Acetaminophen (TYLENOL) Instructions    You were discharged with acetaminophen (TYLENOL) for pain management after surgery. Acetaminophen most effectively manages pain symptoms when it is taken on a schedule without missing doses (every four, six, or eight hours). Your Provider will prescribe a safe daily dose between 3000 - 4000 mg.  Do NOT exceed this daily dose. Most patients use acetaminophen for pain control for the first four weeks after surgery.  You can wean from this medication as your pain decreases.     Medication Instructions - NSAID Instructions    You were discharged with an anti-inflammatory medication for pain management to use in combination with acetaminophen (TYLENOL) and the narcotic pain medication.  Take this medication exactly as directed.  You should only take one anti-inflammatory at a time.  Some common anti-inflammatories include: ibuprofen (ADVIL, MOTRIN), naproxen (ALEVE, NAPROSYN), celecoxib (CELEBREX), meloxicam  "(MOBIC), ketorolac (TORADOL).  Take this medication with food and water.     Medication Instructions - Opioids - Tapering Instructions    In the first three days following surgery, your symptoms may warrant use of the narcotic pain medication every four to six hours as prescribed. This is normal. As your pain symptoms improve, focus your efforts on decreasing (tapering) use of narcotic medications. The most successful tapering strategy is to first, decrease the number of tablets you take every 4-6 hours to the minimum prescribed. Then, increase the amount of time between doses.  For example:  First, taper to   or 1 tablet every 4-6 hours.  Then, taper to   or 1 tablet every 6-8 hours.  Then, taper to   or 1 tablet every 8-10 hours.  Then, taper to   or 1 tablet every 10-12 hours.  Then, taper to   or 1 tablet at bedtime.  The bedtime dose can help with comfort during sleep and is typically the last dose to be discontinued after surgery.     Follow Up Care    Follow-up with your Surgeon Team in 2 weeks [facility] for wound check.     Medication instructions -  Anticoagulation - aspirin    Take the aspirin as prescribed for a total of four weeks after surgery.  This is given to help minimize your risk of blood clot.     Comfort and Pain Management - LOWER Extremity Elevation    Swelling is expected for several months after surgery. This type of swelling is usually associated with gravity and activity, and can be improved with elevation.   The best way to do this is to get your \"toes above your nose\" by laying down and placing several pillows lengthwise under your calf and heel. When elevating your leg keep your knee completely straight. Perform this elevation as often as possible especially for the first two weeks after surgery.     Medication Instructions - Opioid Instructions (Less than 65 years)    You were discharged with an opioid medication (hydromorphone, oxycodone, hydrocodone, or tramadol). This medication " should only be taken for breakthrough pain that is not controlled with acetaminophen (TYLENOL). If you rate your pain less than 3 you do not need this medication.  Pain rating 0-3:  You do not need this medication.  Pain rating 4-6:  Take 1 tablet every 4-6 hours as needed  Pain rating 7-10:  Take 2 tablets every 4-6 hours as needed.  Do not exceed 6 tablets per day     Physical Therapy Instructions    Begin physical therapy within one week following surgery.     Activity - Total Knee Arthroplasty     Return to Driving    Return to driving - Driving is NOT permitted until directed by your provider. Under no circumstance are you permitted to drive while using narcotic pain medications.     Dressing / Wound Care - Wound    You have a clean dressing on your surgical wound. Dressing change instructions as follows: dressing will be removed at your follow-up appointment. Contact your Surgeon Team if you have increased redness, warmth around the surgical wound, and/or drainage from the surgical wound.     Dressing / Wound Care - NO Tub Bathing    Tub bathing, swimming, or any other activities that will cause your incision to be submerged in water should be avoided until allowed by your Surgeon.     NO Precautions    No precautions directed by your Provider.  You may perform range of motion activities as tolerated.     Weight bearing as tolerated    Weight bearing as tolerated on your operative extremity.     Dressing / Wound care - Shower with wound/dressing covered    You must COVER your dressing or incision with saran wrap (or any other non-permeable covering) to allow the incision to remain dry while showering.  You may shower 3 days after surgery as long as the surgical wound stays dry. Continue to cover your dressing or incision for showering until your first office visit.  You are strictly prohibited from soaking   or submerging the surgical wound underwater.     Dudley CUELLO Documentation: Describe the reason  for need to support medical necessity: Impaired gait status post knee surgery. I, the undersigned, certify that the above prescribed supplies are medically necessary for this patient and is both reasonable and necessary in reference to accepted standards of medical practice in the treatment of this patient's condition and is not prescribed as a convenience.     Discharge Instruction - Regular Diet Adult    Return to your pre-surgery diet unless instructed otherwise     Discharge Medications   Current Discharge Medication List      START taking these medications    Details   acetaminophen (TYLENOL) 325 MG tablet Take 2 tablets (650 mg) by mouth every 4 hours as needed for other (mild pain)  Qty: 100 tablet, Refills: 0    Associated Diagnoses: Chronic pain of both knees      hydrOXYzine (ATARAX) 25 MG tablet Take 1 tablet (25 mg) by mouth every 6 hours as needed for itching or anxiety (with pain, moderate pain)  Qty: 30 tablet, Refills: 0    Associated Diagnoses: Chronic pain of both knees      oxyCODONE (ROXICODONE) 5 MG tablet Take 1-2 tablets (5-10 mg) by mouth every 3 hours as needed for pain (Moderate to Severe)  Qty: 20 tablet, Refills: 0    Associated Diagnoses: Chronic pain of both knees      senna-docusate (SENOKOT-S/PERICOLACE) 8.6-50 MG tablet Take 1-2 tablets by mouth 2 times daily Take while on oral narcotics to prevent or treat constipation.  Qty: 30 tablet, Refills: 0    Comments: While taking narcotics  Associated Diagnoses: Chronic pain of both knees         CONTINUE these medications which have CHANGED    Details   aspirin (ASA) 325 MG EC tablet Take 1 tablet (325 mg) by mouth 2 times daily  Qty: 60 tablet, Refills: 0    Associated Diagnoses: Chronic pain of both knees         CONTINUE these medications which have NOT CHANGED    Details   calcium carbonate-vitamin D (OSCAL W/D) 500-200 MG-UNIT tablet Take 2 tablets by mouth daily   Qty:        Multiple Vitamins-Minerals (MULTIVITAMIN ADULT EXTRA C)  CHEW Take 2 chew tab by mouth daily - gummy formulation      POTASSIUM PO Take 2 tablets by mouth daily         STOP taking these medications       multivitamin w/minerals (THERA-VIT-M) tablet Comments:   Reason for Stopping:             Allergies   Allergies   Allergen Reactions     Penicillins Rash     Sulfa Drugs Rash     Data   Most Recent 3 CBC's:  Recent Labs   Lab Test 04/08/21  0530 04/07/21  0515 03/12/21  0917 01/27/21  0835 11/28/18  1327 11/28/18  1327 08/02/18  1357   WBC  --   --   --  6.1  --  8.0 6.6   HGB 11.7 12.7 13.5 13.2   < > 13.5 13.5   MCV  --   --   --  89  --  89 89   PLT  --   --   --  250  --  258 267    < > = values in this interval not displayed.      Most Recent 3 BMP's:  Recent Labs   Lab Test 01/07/21  0909 11/28/18  1327 08/02/18  1357    141 141   POTASSIUM 4.3 4.4 4.0   CHLORIDE 105 104 104   CO2 28 29 29   BUN 13 22 11   CR 0.77 0.65 0.83   ANIONGAP 7 8 8   ELAN 9.6 9.3 9.8   GLC 97 88 93     Most Recent 2 LFT's:  Recent Labs   Lab Test 04/14/17  0829   ALKPHOS 77   BILITOTAL 0.7     Most Recent INR's and Anticoagulation Dosing History:  Anticoagulation Dose History     There is no flowsheet data to display.        Most Recent 3 Troponin's:No lab results found.  Most Recent Cholesterol Panel:  Recent Labs   Lab Test 01/07/21  0909   CHOL 260*   *   HDL 59   TRIG 129     Most Recent 6 Bacteria Isolates From Any Culture (See EPIC Reports for Culture Details):  Recent Labs   Lab Test 01/20/21  1709   CULT 10,000 to 50,000 colonies/mL  mixed urogenital bryan  No further identification or sensitivity done       Most Recent TSH, T4 and A1c Labs:No lab results found.  Results for orders placed or performed during the hospital encounter of 04/06/21   XR Knee Port Right 1/2 Views    Narrative    XR KNEE PORT RT 1/2 VW    HISTORY: 55 years Female Post-Op Total Knee    COMPARISON: None    TECHNIQUE: 2 views right knee    FINDINGS: Patient status post right total knee  arthroplasty. Skin  closure staples are present. There is no evidence of fracture.  Alignment is anatomic. Soft tissue air is present      Impression    IMPRESSION: Postop right total knee arthroplasty. No evidence of  complication.    YANICK GREENBERG MD

## 2021-04-08 NOTE — PROGRESS NOTES
Discharge Note      Data:  Michelle Du discharged to home at 1018 via wheel chair. Accompanied by staff.    Action:  Written discharge/follow-up instructions were provided to patient. Prescriptions sent to patients preferred pharmacy. All belongings sent with patient.    Response:  Michelle verbalized understanding of discharge instructions, reason for discharge, and necessary follow-up appointments.

## 2021-04-08 NOTE — PROGRESS NOTES
04/08/21 1300   Signing Clinician's Name / Credentials   Signing clinician's name / credentials David Curtis MPT   Quick Adds   Rehab Discipline PT   ROM: Right Knee   Extension/Flexion - degrees 5-65   ROM Type Extension/Flexion AAROM   Functional Transfer Training   Symptoms Noted During/After Treatment increased pain   Treatment Detail SBA with Fww   Gait Training   Symptoms Noted During/After Treatment (Gait Training) increased pain   Distance in Feet (Required for LE Total Joints) 150   Treatment Detail ambulation in hallway   [place] Level (Gait Training) contact guard   Physical Assistance Level (Gait Training) supervision   Weight Bearing (Gait Training) weight-bearing as tolerated   Assistive Device (Gait Training) rolling walker   Gait Analysis Deviations decreased aziza;increased time in double stance;decreased step length;decreased swing-to-stance ratio   Therapeutic Activity   Treatment Detail bed mobility with minimal assist   Therapeutic Exercise   Treatment Detail TKA exercise with poor quad contraction   PT Discharge Planning    PT Discharge Recommendation (DC Rec) home with outpatient physical therapy   PT Rationale for DC Rec to promote strength, stability, knee function and safe mobility   PT Brief overview of current status  minimal assist for bed mobilities; SBA for transfers and ambulation using a Fww   Additional Documentation   PT Plan patient discharging home with outpatient PT   Total Session Time   Total Session Time (minutes) 40 minutes

## 2021-04-08 NOTE — PLAN OF CARE
"Pt reports pain a 6/10 in right knee after doing some exercises and movement, given PRN pain medication (see MAR). Denies nausea and tolerating adequate PO intake. VSS. Incisional dressing is clean, dry, and intact, ice applied. Afebrile. BP (!) 145/73   Pulse 82   Temp 99.3  F (37.4  C) (Tympanic)   Resp 16   Ht 1.549 m (5' 1\")   Wt 114.3 kg (252 lb)   LMP 05/25/2018 (Exact Date)   SpO2 99%   BMI 47.61 kg/m      "

## 2021-04-08 NOTE — PLAN OF CARE
Patient was pleasant. Patient's vitals are stable. Lung sounds are clear. Patient denies pain. Patient stated that she is hoping that she goes home tomorrow. No new concerns. Will continue to monitor.  Álvaro Walter RN on 4/7/2021 at 10:53 PM

## 2021-04-08 NOTE — PROGRESS NOTES
04/07/21 1700   Signing Clinician's Name / Credentials   Signing clinician's name / credentials David Curtis MPT   Quick Adds   Rehab Discipline PT   ROM: Right Knee   Extension/Flexion - degrees 5-85   ROM Type Extension/Flexion AAROM   Pathologic End Feel Extension/Flexion soft pathologic end feel   Functional Transfer Training   Treatment Detail CGA with Fww   Gait Training   Symptoms Noted During/After Treatment (Gait Training) increased pain;fatigue   Distance in Feet (Required for LE Total Joints) 50   Treatment Detail ambulation out to hallway   [place] Level (Gait Training) contact guard   Physical Assistance Level (Gait Training) 1 person assist   Weight Bearing (Gait Training) weight-bearing as tolerated   Assistive Device (Gait Training) rolling walker   Gait Analysis Deviations decreased aziza;increased time in double stance;decreased step length;decreased swing-to-stance ratio   Therapeutic Activity   Treatment Detail minimal assist for bed mobilities   Therapeutic Exercise   Treatment Detail TKA with fair quad contraction   PT Discharge Planning    PT Discharge Recommendation (DC Rec) home with outpatient physical therapy   PT Rationale for DC Rec to promote strength, stability, knee function and safe mobility   PT Brief overview of current status  minimal assist for bed mobilities; CGA for transfers and ambulation using a Fww   Additional Documentation   Rehab Comments good pain control   PT Plan Continue PT   Total Session Time   Total Session Time (minutes) 45 minutes

## 2021-04-08 NOTE — PLAN OF CARE
Physical Therapy Discharge Summary    Reason for therapy discharge:    Discharged to home with outpatient therapy.    Progress towards therapy goal(s). See goals on Care Plan in [facility] electronic health record for goal details.  Goals partially met.  Barriers to achieving goals:   discharge from facility.    Therapy recommendation(s):    Continued therapy is recommended.  Rationale/Recommendations:  to promote strength, stability, return of right knee function and safe mobility.

## 2021-04-08 NOTE — PROGRESS NOTES
04/06/21 1700   Quick Adds   Type of Visit Initial PT Evaluation   Living Environment   People in home spouse   Current Living Arrangements house   Home Accessibility no concerns   Number of Stairs, Within Home, Primary 3   Stair Railings, Within Home, Primary railings on both sides of stairs   Transportation Anticipated family or friend will provide   Self-Care   Usual Activity Tolerance good   Current Activity Tolerance fair   Equipment Currently Used at Home cane, straight   Disability/Function   Hearing Difficulty or Deaf no   Wear Glasses or Blind yes   Vision Management glasses   Concentrating, Remembering or Making Decisions Difficulty no   Difficulty Communicating no   Difficulty Eating/Swallowing no   Walking or Climbing Stairs Difficulty no   Dressing/Bathing Difficulty no   Toileting issues no   Doing Errands Independently Difficulty (such as shopping) no   Fall history within last six months no   General Information   Referring Physician Sabina   Patient/Family Therapy Goals Statement (PT) return home   Existing Precautions/Restrictions fall  (s/p right TKA)   Weight-Bearing Status - LLE full weight-bearing   Weight-Bearing Status - RLE weight-bearing as tolerated   Cognition   Orientation Status (Cognition) oriented x 4   Affect/Mental Status (Cognition) WFL   Follows Commands (Cognition) WFL   Pain Assessment   Patient Currently in Pain Yes, see Vital Sign flowsheet   Integumentary/Edema   Integumentary/Edema Comments right knee incision   Posture    Posture Forward head position   Range of Motion (ROM)   ROM Quick Adds ROM WFL   ROM Comment with exception of right knee post-op   Strength   Manual Muscle Testing Quick Adds Strength WFL   Strength Comments with ex   Bed Mobility   Bed Mobility supine-sit;sit-supine   Supine-Sit [place] (Bed Mobility) moderate assist (50% patient effort)   Sit-Supine [place] (Bed Mobility) moderate assist (50% patient effort)   Transfers   Transfer Safety  Comments minimal assist of 2 with Fww for pivot transfer to commode   Balance   Balance Comments fair with Fww   Sensory Examination   Sensory Perception Comments numbness present in LEs   Coordination   Coordination no deficits were identified   Muscle Tone   Muscle Tone no deficits were identified   Clinical Impression   PT Diagnosis (PT) impaired mobility   Influenced by the following impairments pain, fatigue and weakness   Functional limitations due to impairments activity/gait tolerance   Clinical Presentation Stable/Uncomplicated   Clinical Decision Making (Complexity) low complexity   Therapy Frequency (PT) Daily   Predicted Duration of Therapy Intervention (days/wks) 1-2 days   Planned Therapy Interventions (PT) bed mobility training;gait training;ROM (range of motion);strengthening;transfer training   Anticipated Equipment Needs at Discharge (PT) walker, rolling   Risk & Benefits of therapy have been explained risks/benefits reviewed;patient   PT Discharge Planning    PT Discharge Recommendation (DC Rec) home with assist;home with outpatient physical therapy   PT Rationale for DC Rec to promote strength, stability, knee function and safe mobility   PT Brief overview of current status  moderate assist for bed mobilities; minimal assist of 2 for transfers with Fww   Total Evaluation Time   Total Evaluation Time (Minutes) 15

## 2021-04-08 NOTE — PROGRESS NOTES
:     Patient discharging home with outpatient therapy with [facility].    JONNIE Groves on 4/8/2021 at 12:56 PM

## 2021-04-08 NOTE — PROGRESS NOTES
04/08/21 0900   Signing Clinician's Name / Credentials   Signing clinician's name / credentials Willa Leiva OTR/L   Quick Adds   Rehab Discipline OT   Quick Adds Therapeutic Activity;BADL   Therapeutic Activity   Symptoms Noted During/After Treatment None   Treatment Detail bed mobility CGA; ambulated to and from shower with FWW and CGA   Grooming Training   [place] Level (Grooming Training) stand-by assist   Assistance (Grooming Training) supervision   Bathing Training   [place] Level (Bathing Training) minimum assist (75% patient effort)   Assistance (Bathing Training) 1 person assist   Treatment Detail min assist to reach lower legs and feet   Upper Body Dressing Training   [place] (Upper Body Dressing Training) independent   Lower Body Dressing Training   Lower Body Dressing Training Assistance moderate assist (50% patient effort)   Lower Body Dressing Training Assistance 1 person assist   Treatment Detail assist to initiate clothing over right LE, assist needed to pull over hips and make adjustments due to wet skin after shower; OT assist to mike elastic lace shoes and socks.   Toilet Training   [place] Level (Toilet Training) stand-by assist   Assistance (Toilet Training) supervision   OT Discharge Planning    OT Discharge Recommendation (DC Rec) Home with assist   OT Rationale for DC Rec spouse will assist at home as needed with LB dressing until she regains flexibility; otherwise SBA with self cares and mobility; no further OT needed   OT Brief overview of current status  SBA with FWW for mobility; continues to need min/mod assist with LB dressing. Pt reports she will wear slippers at home, as shoes were difficult/tight to get on. No further OT needs   Additional Documentation   OT Plan DC home today   Total Session Time   Total Session Time (minutes) 45 minutes

## 2021-04-09 ENCOUNTER — PATIENT OUTREACH (OUTPATIENT)
Dept: CARE COORDINATION | Facility: CLINIC | Age: 56
End: 2021-04-09

## 2021-04-09 NOTE — PROGRESS NOTES
Clinic Care Coordination Contact    Chart reviewed due to recent hospitalization, patient surgical candidate only, does not meet requirement.    JONNIE Dunn on 4/9/2021 at 8:17 AM

## 2021-04-11 ENCOUNTER — MYC MEDICAL ADVICE (OUTPATIENT)
Dept: ORTHOPEDICS | Facility: OTHER | Age: 56
End: 2021-04-11

## 2021-04-12 ENCOUNTER — TRANSFERRED RECORDS (OUTPATIENT)
Dept: HEALTH INFORMATION MANAGEMENT | Facility: OTHER | Age: 56
End: 2021-04-12

## 2021-04-21 ENCOUNTER — MYC MEDICAL ADVICE (OUTPATIENT)
Dept: ORTHOPEDICS | Facility: OTHER | Age: 56
End: 2021-04-21

## 2021-04-21 ENCOUNTER — OFFICE VISIT (OUTPATIENT)
Dept: ORTHOPEDICS | Facility: OTHER | Age: 56
End: 2021-04-21
Attending: PHYSICIAN ASSISTANT
Payer: COMMERCIAL

## 2021-04-21 DIAGNOSIS — Z96.651 STATUS POST TOTAL RIGHT KNEE REPLACEMENT: Primary | ICD-10-CM

## 2021-04-21 PROCEDURE — 99024 POSTOP FOLLOW-UP VISIT: CPT | Performed by: ORTHOPAEDIC SURGERY

## 2021-04-21 NOTE — PROGRESS NOTES
Patient is here for follow up on her right total knee.   Shikha Cuellar LPN .....................4/21/2021 9:26 AM

## 2021-04-21 NOTE — PROGRESS NOTES
Visit Date: 2021    REASON FOR EVALUATION:  Right knee replacement.    HISTORY OF PRESENT ILLNESS:  Michelle comes back after right knee replaced and is doing well at this time.  Pain has been very manageable at this point.  She is here for suture removal and examination at this time.     PHYSICAL EXAMINATION:  Examination of right knee shows incision site to be well healed .  No signs or symptoms of infection present.  Neurovascular examination is otherwise intact.  Range of motion about -5 to 85 is tolerable at this point in time.    IMPRESSION:  Right total knee replacement, doing well.    PLAN:  Continue therapy, which she is currently going 3 days a week.  I will see her back in a month.  X-rays, three views, of the right knee at that point in time.  No other concerns have been identified at today's appointment.    Mario Muhammad MD        D: 2021   T: 2021   MT: MONIE    Name:     MICHELLE GALVAN  MRN:      [id number]        Account:    [id number]   :      1965           Visit Date: 2021     Document: [id number]

## 2021-04-22 ENCOUNTER — DOCUMENTATION ONLY (OUTPATIENT)
Dept: OTHER | Facility: CLINIC | Age: 56
End: 2021-04-22

## 2021-05-17 ENCOUNTER — TRANSFERRED RECORDS (OUTPATIENT)
Dept: HEALTH INFORMATION MANAGEMENT | Facility: OTHER | Age: 56
End: 2021-05-17

## 2021-05-18 DIAGNOSIS — Z96.651 STATUS POST TOTAL RIGHT KNEE REPLACEMENT: Primary | ICD-10-CM

## 2021-05-19 ENCOUNTER — HOSPITAL ENCOUNTER (OUTPATIENT)
Dept: GENERAL RADIOLOGY | Facility: OTHER | Age: 56
End: 2021-05-19
Attending: ORTHOPAEDIC SURGERY
Payer: COMMERCIAL

## 2021-05-19 ENCOUNTER — OFFICE VISIT (OUTPATIENT)
Dept: ORTHOPEDICS | Facility: OTHER | Age: 56
End: 2021-05-19
Attending: ORTHOPAEDIC SURGERY
Payer: COMMERCIAL

## 2021-05-19 DIAGNOSIS — Z96.651 STATUS POST TOTAL RIGHT KNEE REPLACEMENT: ICD-10-CM

## 2021-05-19 DIAGNOSIS — Z96.651 STATUS POST TOTAL RIGHT KNEE REPLACEMENT: Primary | ICD-10-CM

## 2021-05-19 PROCEDURE — 99024 POSTOP FOLLOW-UP VISIT: CPT | Performed by: ORTHOPAEDIC SURGERY

## 2021-05-19 PROCEDURE — 73562 X-RAY EXAM OF KNEE 3: CPT | Mod: RT

## 2021-05-19 NOTE — PROGRESS NOTES
Visit Date: 2021    REASON FOR EVALUATION:  Right knee replacement.    HISTORY OF PRESENT ILLNESS:  Michelle comes in with right knee replacement.  Overall, reports doing well at this time.  No major concerns.  Happy with the outcome here at this point in time.  The patient is 6 weeks' post surgery, is going to therapies at [facility].    PHYSICAL EXAMINATION:    EXTREMITIES:  Exam of knee shows motion 0 to about 110.  Ligament exam stable and balanced.  Kneecap tracking is acceptable here as well.  Incisional sites well healed.  Does have a little bit of swelling noted at this point in time.    X-rays reviewed, 3 views right knee.  The patient's components are in stable alignment and position at this current time.    IMPRESSION:  Right total knee replacement, doing well.    PLAN:  At this time, continue through the rehabilitation process.  I will see her back in a month, just simple therapy check.  She also did touch on her left knee and will certainly need to be considered having that done here in the near future as well.    Mario Muhammad MD        D: 2021   T: 2021   MT: [email or web address]    Name:     MICHELLE GALVAN  MRN:      [id number]        Account:    [id number]   :      1965           Visit Date: 2021     Document: [id number]

## 2021-05-19 NOTE — PROGRESS NOTES
Patient is here for follow up on her right total knee.   Shikha Cuellar LPN .....................5/19/2021 10:59 AM

## 2021-06-01 ENCOUNTER — TRANSFERRED RECORDS (OUTPATIENT)
Dept: HEALTH INFORMATION MANAGEMENT | Facility: OTHER | Age: 56
End: 2021-06-01

## 2021-06-17 ENCOUNTER — TRANSFERRED RECORDS (OUTPATIENT)
Dept: HEALTH INFORMATION MANAGEMENT | Facility: OTHER | Age: 56
End: 2021-06-17

## 2021-06-22 ENCOUNTER — OFFICE VISIT (OUTPATIENT)
Dept: ORTHOPEDICS | Facility: OTHER | Age: 56
End: 2021-06-22
Attending: ORTHOPAEDIC SURGERY
Payer: COMMERCIAL

## 2021-06-22 DIAGNOSIS — G89.29 CHRONIC PAIN OF BOTH KNEES: ICD-10-CM

## 2021-06-22 DIAGNOSIS — M25.561 CHRONIC PAIN OF BOTH KNEES: ICD-10-CM

## 2021-06-22 DIAGNOSIS — Z96.651 STATUS POST TOTAL RIGHT KNEE REPLACEMENT: Primary | ICD-10-CM

## 2021-06-22 DIAGNOSIS — M25.562 CHRONIC PAIN OF BOTH KNEES: ICD-10-CM

## 2021-06-22 PROCEDURE — 99024 POSTOP FOLLOW-UP VISIT: CPT | Performed by: ORTHOPAEDIC SURGERY

## 2021-06-22 PROCEDURE — 99213 OFFICE O/P EST LOW 20 MIN: CPT | Mod: 24 | Performed by: ORTHOPAEDIC SURGERY

## 2021-06-22 NOTE — PROGRESS NOTES
Visit Date: 2021    REASONS FOR EVALUATION:    1.  Right knee replacement.  2.  Left knee pain.    HISTORY OF PRESENT ILLNESS:  Michelle comes back.  She is 11 weeks out from right knee replacement, doing very well at this time.  No major problems.  They are happy with the outcome here, is basically on her own therapy process at this time.  She is also reporting left knee discomfort, has known significant arthrosis of the knee.  Does want to proceed forward with intervention and a knee replacement here in August, would like to get the ball started for that.  She elected to do her therapies at [facility] post-surgery and then aspirin for DVT prophylaxis as well.  She had a 2-day stay last time.    PHYSICAL EXAMINATION:    EXTREMITIES:  Examination of right knee shows motion 0-120.  Ligament exam stable and balanced.  Kneecap tracking acceptable.  Quad strength is excellent.  Incision sites well healed.  Left knee exam shows varus deformity.  Range of motion 0-115.  Ligament exam stable and balanced.  Kneecap tracking with crepitus, but no significant maltracking otherwise seen at this time.      X-rays not obtained here today.    IMPRESSION:    1.  Right total knee replacement, doing very well.  2.  Left knee end-stage osteoarthrosis.    PLAN:  As far as the right knee is concerned, activities as tolerated.  I will see her back at the 1-year checkup point here with x-rays at that time.  As far as the left knee is concerned, I advised proceeding forward with left total knee replacement, [place] posterior stabilized, 2-day stay, transition to home, outpatient therapies at [facility], aspirin for DVT prophylaxis and oxycodone for pain management as well postoperative.  We will work on scheduling for her at this point in time.    Mario Muhammad MD        D: 2021   T: 2021   MT: [email or web address]    Name:     MICHELLE GALVAN  MRN:      [id number]        Account:    [id number]   :      1965           Visit  Date: 06/22/2021     Document: [id number]

## 2021-08-04 ENCOUNTER — OFFICE VISIT (OUTPATIENT)
Dept: FAMILY MEDICINE | Facility: OTHER | Age: 56
End: 2021-08-04
Attending: PHYSICIAN ASSISTANT
Payer: COMMERCIAL

## 2021-08-04 VITALS
SYSTOLIC BLOOD PRESSURE: 134 MMHG | TEMPERATURE: 99.4 F | OXYGEN SATURATION: 97 % | HEIGHT: 62 IN | DIASTOLIC BLOOD PRESSURE: 84 MMHG | WEIGHT: 260 LBS | HEART RATE: 87 BPM | RESPIRATION RATE: 16 BRPM | BODY MASS INDEX: 47.84 KG/M2

## 2021-08-04 DIAGNOSIS — M25.561 CHRONIC PAIN OF BOTH KNEES: ICD-10-CM

## 2021-08-04 DIAGNOSIS — M25.562 CHRONIC PAIN OF BOTH KNEES: ICD-10-CM

## 2021-08-04 DIAGNOSIS — G89.29 CHRONIC PAIN OF BOTH KNEES: ICD-10-CM

## 2021-08-04 DIAGNOSIS — Z01.818 PREOP GENERAL PHYSICAL EXAM: Primary | ICD-10-CM

## 2021-08-04 DIAGNOSIS — E78.2 MIXED HYPERLIPIDEMIA: ICD-10-CM

## 2021-08-04 LAB
ALBUMIN UR-MCNC: NEGATIVE MG/DL
ANION GAP SERPL CALCULATED.3IONS-SCNC: 9 MMOL/L (ref 3–14)
APPEARANCE UR: CLEAR
BILIRUB UR QL STRIP: NEGATIVE
BUN SERPL-MCNC: 25 MG/DL (ref 7–25)
CALCIUM SERPL-MCNC: 9.9 MG/DL (ref 8.6–10.3)
CHLORIDE BLD-SCNC: 104 MMOL/L (ref 98–107)
CO2 SERPL-SCNC: 28 MMOL/L (ref 21–31)
COLOR UR AUTO: NORMAL
CREAT SERPL-MCNC: 0.89 MG/DL (ref 0.6–1.2)
GFR SERPL CREATININE-BSD FRML MDRD: 74 ML/MIN/1.73M2
GLUCOSE BLD-MCNC: 91 MG/DL (ref 70–105)
GLUCOSE UR STRIP-MCNC: NEGATIVE MG/DL
HGB BLD-MCNC: 13.6 G/DL (ref 11.7–15.7)
HGB UR QL STRIP: NEGATIVE
KETONES UR STRIP-MCNC: NEGATIVE MG/DL
LEUKOCYTE ESTERASE UR QL STRIP: NEGATIVE
NITRATE UR QL: NEGATIVE
PH UR STRIP: 6 [PH] (ref 5–9)
POTASSIUM BLD-SCNC: 4 MMOL/L (ref 3.5–5.1)
SODIUM SERPL-SCNC: 141 MMOL/L (ref 134–144)
SP GR UR STRIP: 1.03 (ref 1–1.03)
UROBILINOGEN UR STRIP-MCNC: NORMAL MG/DL

## 2021-08-04 PROCEDURE — 85018 HEMOGLOBIN: CPT | Mod: ZL | Performed by: PHYSICIAN ASSISTANT

## 2021-08-04 PROCEDURE — 99214 OFFICE O/P EST MOD 30 MIN: CPT | Performed by: PHYSICIAN ASSISTANT

## 2021-08-04 PROCEDURE — 81003 URINALYSIS AUTO W/O SCOPE: CPT | Mod: ZL | Performed by: PHYSICIAN ASSISTANT

## 2021-08-04 PROCEDURE — 36415 COLL VENOUS BLD VENIPUNCTURE: CPT | Mod: ZL | Performed by: PHYSICIAN ASSISTANT

## 2021-08-04 PROCEDURE — 82374 ASSAY BLOOD CARBON DIOXIDE: CPT | Mod: ZL | Performed by: PHYSICIAN ASSISTANT

## 2021-08-04 PROCEDURE — 93000 ELECTROCARDIOGRAM COMPLETE: CPT | Performed by: INTERNAL MEDICINE

## 2021-08-04 RX ORDER — ASPIRIN 81 MG/1
81 TABLET ORAL DAILY
Status: ON HOLD | COMMUNITY
End: 2021-08-21

## 2021-08-04 ASSESSMENT — MIFFLIN-ST. JEOR: SCORE: 1724.66

## 2021-08-04 ASSESSMENT — PAIN SCALES - GENERAL: PAINLEVEL: MILD PAIN (2)

## 2021-08-04 NOTE — PATIENT INSTRUCTIONS
Patient should take the following medications the morning of surgery with a small sip of water: hold all meds.  Patient was instructed to hold the following medications the morning of surgery: hold all meds.     Patient was advised to call our office and the surgical services with any change in condition or new symptoms if they were to develop between today and their surgical date.  Especially any cardiopulmonary symptoms or symptoms concerning for an infection.     Discontinue aspirin, aleve, naproxen and ibuprofen 7 days prior to surgery to reduce bleeding risk.  It is fine to take tylenol the week before surgery.  Hold vitamins and herbal remedies for 7 days before surgery.    Please have a [employer] COVID-19 test on 2021.   Please stay in your car and call [phone] when you arrive.      Preparing for Your Surgery  Getting started  A nurse will call you to review your health history and instructions. They will give you an arrival time based on your scheduled surgery time.  Please be ready to share the following:    Your doctor's clinic name and phone number    Your medical, surgical and anesthesia history    A list of allergies and sensitivities    A list of medicines, including herbal treatments and over-the-counter drugs    Whether the patient has a legal guardian (ask how to send us the papers in advance)  If you have a child who's having surgery, please ask for a copy of Preparing for Your Child's Surgery.    Preparing for surgery    Within 30 days of surgery: Have a pre-op exam (sometimes called an H&P, or History and Physical). This can be done at a clinic or pre-operative center.  ? If you're having a , you may not need this exam. Talk to your care team    At your pre-op exam, talk to your care team about all medicines you take. If you need to stop any medicines before surgery, ask when to start taking them again.  ? We do this for your safety. Many medicines can make you bleed too much  during surgery. Some change how well surgery (anesthesia) drugs work.    Call your insurance company to let them know you're having surgery. (If you don't have insurance, call [phone].)    Call your clinic if there's any change in your health. This includes signs of a cold or flu (sore throat, runny nose, cough, rash, fever). It also includes a scrape or scratch near the surgery site.    If you have questions on the day of surgery, call your hospital or surgery center.  Eating and drinking guidelines  For your safety: Unless your surgeon tells you otherwise, follow the guidelines below.    Eat and drink as usual until 8 hours before surgery. After that, no food or milk.    Drink clear liquids until 2 hours before surgery. These are liquids you can see through, like water, Gatorade and Propel Water. You may also have black coffee and tea (no cream or milk).    Nothing by mouth within 2 hours of surgery. This includes gum, candy and breath mints.    If you drink, stop drinking alcohol the night before surgery.    If your care team tells you to take medicine on the morning of surgery, it's okay to take it with a sip of water.  Preventing infection    Shower or bathe the night before and morning of your surgery. Follow the instructions your clinic gave you. (If no instructions, use regular soap.)    Don't shave or clip hair near your surgery site. We'll remove the hair if needed.    Don't smoke or vape the morning of surgery. You may chew nicotine gum up to 2 hours before surgery. A nicotine patch is okay.  ? Note: Some surgeries require you to completely quit smoking and nicotine. Check with your surgeon.    Your care team will make every effort to keep you safe from infection. We will:  ? Clean our hands often with soap and water (or an alcohol-based hand rub).  ? Clean the skin at your surgery site with a special soap that kills germs.  ? Give you a special gown to keep you warm. (Cold raises the risk of  infection.)  ? Wear special hair covers, masks, gowns and gloves during surgery.  ? Give antibiotic medicine, if prescribed. Not all surgeries need antibiotics.  What to bring on the day of surgery    Photo ID and insurance card    Copy of your health care directive, if you have one    Glasses and hearing aides (bring cases)  ? You can't wear contacts during surgery    Inhaler and eye drops, if you use them (tell us about these when you arrive)    CPAP machine or breathing device, if you use them    A few personal items, if spending the night    If you have . . .  ? A pacemaker or ICD (cardiac defibrillator): Bring the ID card.  ? An implanted stimulator: Bring the remote control.  ? A legal guardian: Bring a copy of the certified (court-stamped) guardianship papers.  Please remove any jewelry, including body piercings. Leave jewelry and other valuables at home.  If you're going home the day of surgery  Important: If you don't follow the rules below, we must cancel your surgery.     Arrange for someone to drive you home after surgery. You may not drive, take a taxi or take public transportation by yourself (unless you'll have local anesthesia only).    Arrange for a responsible adult to stay with you overnight. If you don't, we may keep you in the hospital overnight, and you may need to pay the costs yourself.  Questions?   If you have any questions for your care team, list them here: _________________________________________________________________________________________________________________________________________________________________________________________________________________________________________________________________________________________________________________________  For informational purposes only. Not to replace the advice of your health care provider. Copyright   2003, 2019 [facility]. All rights reserved. Clinically reviewed by Nereida Hernandez MD. [employer] 185741 - REV  4/20.

## 2021-08-04 NOTE — PROGRESS NOTES
[facility]  [address]  [place] [address]  Phone: [phone]  Fax: [phone]  Primary Provider: Cristal Naranjo  Pre-op Performing Provider: CRISTAL NARANJO      PREOPERATIVE EVALUATION:  Today's date: 8/4/2021    Michelle Du is a 55 year old female who presents for a preoperative evaluation.    Surgical Information:  Surgery/Procedure: ARTHROPLASTY, KNEE, TOTAL  Surgery Location: [facility]  Surgeon: Dr Muhammad   Surgery Date: 8/20/21  Time of Surgery: TBD  Where patient plans to recover: At home with family  Fax number for surgical facility: Note does not need to be faxed, will be available electronically in Epic.    Type of Anesthesia Anticipated: Spinal    Assessment & Plan     The proposed surgical procedure is considered INTERMEDIATE risk.    Problem List Items Addressed This Visit        Nervous and Auditory    Chronic pain of both knees    Relevant Medications    aspirin 81 MG EC tablet    Other Relevant Orders    Hemoglobin (Completed)       Endocrine    Hyperlipidemia    Relevant Orders    EKG 12-lead, tracing only (Completed)    Basic Metabolic Panel (Completed)    UA reflex to Microscopic (Completed)    Hemoglobin (Completed)      Other Visit Diagnoses     Preop general physical exam    -  Primary    Relevant Orders    EKG 12-lead, tracing only (Completed)    Basic Metabolic Panel (Completed)    UA reflex to Microscopic (Completed)    Hemoglobin (Completed)        Completed hemoglobin, urinalysis, and BMP which are stable.  No acute concerns appreciated on the EKG.  Patient has a Covid-19 test set up for 8/16/2021.  No acute concerns at this time.    Risks and Recommendations:  The patient has the following additional risks and recommendations for perioperative complications:   - Morbid obesity (BMI >40)  Obstructive Sleep Apnea:   Patient was instructed to bring her sleep apnea machine with the day of surgery.    Recommended for the patient having  anesthesia consult prior to surgery due to the history of morbid obesity.    Medication Instructions:  Patient Instructions     Patient should take the following medications the morning of surgery with a small sip of water: hold all meds.  Patient was instructed to hold the following medications the morning of surgery: hold all meds.     Patient was advised to call our office and the surgical services with any change in condition or new symptoms if they were to develop between today and their surgical date.  Especially any cardiopulmonary symptoms or symptoms concerning for an infection.     Discontinue aspirin, aleve, naproxen and ibuprofen 7 days prior to surgery to reduce bleeding risk.  It is fine to take tylenol the week before surgery.  Hold vitamins and herbal remedies for 7 days before surgery.    Please have a "[employer] COVID-19 test on 2021.   Please stay in your car and call [phone] when you arrive.      Preparing for Your Surgery  Getting started  A nurse will call you to review your health history and instructions. They will give you an arrival time based on your scheduled surgery time.  Please be ready to share the following:    Your doctor's clinic name and phone number    Your medical, surgical and anesthesia history    A list of allergies and sensitivities    A list of medicines, including herbal treatments and over-the-counter drugs    Whether the patient has a legal guardian (ask how to send us the papers in advance)  If you have a child who's having surgery, please ask for a copy of Preparing for Your Child's Surgery.    Preparing for surgery    Within 30 days of surgery: Have a pre-op exam (sometimes called an H&P, or History and Physical). This can be done at a clinic or pre-operative center.  ? If you're having a , you may not need this exam. Talk to your care team    At your pre-op exam, talk to your care team about all medicines you take. If you need to stop any medicines  before surgery, ask when to start taking them again.  ? We do this for your safety. Many medicines can make you bleed too much during surgery. Some change how well surgery (anesthesia) drugs work.    Call your insurance company to let them know you're having surgery. (If you don't have insurance, call [phone].)    Call your clinic if there's any change in your health. This includes signs of a cold or flu (sore throat, runny nose, cough, rash, fever). It also includes a scrape or scratch near the surgery site.    If you have questions on the day of surgery, call your hospital or surgery center.  Eating and drinking guidelines  For your safety: Unless your surgeon tells you otherwise, follow the guidelines below.    Eat and drink as usual until 8 hours before surgery. After that, no food or milk.    Drink clear liquids until 2 hours before surgery. These are liquids you can see through, like water, Gatorade and Propel Water. You may also have black coffee and tea (no cream or milk).    Nothing by mouth within 2 hours of surgery. This includes gum, candy and breath mints.    If you drink, stop drinking alcohol the night before surgery.    If your care team tells you to take medicine on the morning of surgery, it's okay to take it with a sip of water.  Preventing infection    Shower or bathe the night before and morning of your surgery. Follow the instructions your clinic gave you. (If no instructions, use regular soap.)    Don't shave or clip hair near your surgery site. We'll remove the hair if needed.    Don't smoke or vape the morning of surgery. You may chew nicotine gum up to 2 hours before surgery. A nicotine patch is okay.  ? Note: Some surgeries require you to completely quit smoking and nicotine. Check with your surgeon.    Your care team will make every effort to keep you safe from infection. We will:  ? Clean our hands often with soap and water (or an alcohol-based hand rub).  ? Clean the skin at your  surgery site with a special soap that kills germs.  ? Give you a special gown to keep you warm. (Cold raises the risk of infection.)  ? Wear special hair covers, masks, gowns and gloves during surgery.  ? Give antibiotic medicine, if prescribed. Not all surgeries need antibiotics.  What to bring on the day of surgery    Photo ID and insurance card    Copy of your health care directive, if you have one    Glasses and hearing aides (bring cases)  ? You can't wear contacts during surgery    Inhaler and eye drops, if you use them (tell us about these when you arrive)    CPAP machine or breathing device, if you use them    A few personal items, if spending the night    If you have . . .  ? A pacemaker or ICD (cardiac defibrillator): Bring the ID card.  ? An implanted stimulator: Bring the remote control.  ? A legal guardian: Bring a copy of the certified (court-stamped) guardianship papers.  Please remove any jewelry, including body piercings. Leave jewelry and other valuables at home.  If you're going home the day of surgery  Important: If you don't follow the rules below, we must cancel your surgery.     Arrange for someone to drive you home after surgery. You may not drive, take a taxi or take public transportation by yourself (unless you'll have local anesthesia only).    Arrange for a responsible adult to stay with you overnight. If you don't, we may keep you in the hospital overnight, and you may need to pay the costs yourself.  Questions?   If you have any questions for your care team, list them here: _________________________________________________________________________________________________________________________________________________________________________________________________________________________________________________________________________________________________________________________  For informational purposes only. Not to replace the advice of your health care provider. Copyright    2003, 2019 [facility]. All rights reserved. Clinically reviewed by Nereida Hernandez MD. [employer] [id number] - REV 4/20.       RECOMMENDATION:  APPROVAL GIVEN to proceed with proposed procedure, without further diagnostic evaluation.    Recommended for the patient to have an anesthesia consult prior to surgery with the history of morbid obesity.    30 minutes spent on the date of the encounter doing chart review, history and exam, documentation and further activities per the note        Subjective     HPI related to upcoming procedure: Patient has history of bilateral knee pain.  Patient is currently status post right knee replacement.  Her left knee is currently flaring.  Has significant arthrosis of the knee.  Scheduled for left knee replacement.    Preop Questions 8/4/2021   1. Have you ever had a heart attack or stroke? No   2. Have you ever had surgery on your heart or blood vessels, such as a stent placement, a coronary artery bypass, or surgery on an artery in your head, neck, heart, or legs? No   3. Do you have chest pain with activity? No   4. Do you have a history of  heart failure? No   5. Do you currently have a cold, bronchitis or symptoms of other infection? No   6. Do you have a cough, shortness of breath, or wheezing? No   7. Do you or anyone in your family have previous history of blood clots? No   8. Do you or does anyone in your family have a serious bleeding problem such as prolonged bleeding following surgeries or cuts? No   9. Have you ever had problems with anemia or been told to take iron pills? YES - many years ago, unsure of cause. None since then   10. Have you had any abnormal blood loss such as black, tarry or bloody stools, or abnormal vaginal bleeding? No   11. Have you ever had a blood transfusion? YES - many years ago, unsure of cause. None since then   11a. Have you ever had a transfusion reaction? No   12. Are you willing to have a blood transfusion if it is medically needed  before, during, or after your surgery? Yes   13. Have you or any of your relatives ever had problems with anesthesia? No   14. Do you have sleep apnea, excessive snoring or daytime drowsiness? YES - have a sleep apnea machine   14a. Do you have a CPAP machine? Yes   15. Do you have any artifical heart valves or other implanted medical devices like a pacemaker, defibrillator, or continuous glucose monitor? No   16. Do you have artificial joints? Right knee   17. Are you allergic to latex? No   18. Is there any chance that you may be pregnant? No       Health Care Directive:  Patient has a Health Care Directive on file      Preoperative Review of :   reviewed - controlled substances prescribed by other outside provider(s).      Status of Chronic Conditions:  HYPERLIPIDEMIA - Patient has a long history of hyperlipidemia.  Currently diet controlled.  Patient also has history of morbid obesity.    Patient has tolerated surgery well in the past.  Patient has no recent cough or cold symptoms.  No recent fevers, chills, sore throat, ear pain, chest pain, palpitations, problems breathing, stomachaches, nausea, vomiting, diarrhea, constipation, blood in stool or urine, dysuria, frequency, urgency.    Patient can walk up a flight of stairs without becoming short of breath or having chest pain: YES   Patient is able to tolerate greater than 4 METs of activity without any cardiopulmonary symptoms.      Review of Systems  CONSTITUTIONAL: NEGATIVE for fever, chills, change in weight  INTEGUMENTARY/SKIN: NEGATIVE for worrisome rashes, moles or lesions  EYES: NEGATIVE for vision changes or irritation  ENT/MOUTH: NEGATIVE for ear, mouth and throat problems  RESP: NEGATIVE for significant cough or SOB  CV: NEGATIVE for chest pain, palpitations or peripheral edema  GI: NEGATIVE for nausea, abdominal pain, heartburn, or change in bowel habits  : NEGATIVE for frequency, dysuria, or hematuria  MUSCULOSKELETAL: NEGATIVE for  significant arthralgias or myalgia  NEURO: NEGATIVE for weakness, dizziness or paresthesias  ENDOCRINE: NEGATIVE for temperature intolerance, skin/hair changes  HEME: NEGATIVE for bleeding problems  PSYCHIATRIC: NEGATIVE for changes in mood or affect    Patient Active Problem List    Diagnosis Date Noted     Chronic pain of both knees 2021     Priority: Medium     Added automatically from request for surgery [id number]       Morbid obesity (H) 2021     Priority: Medium     Hypovitaminosis D 2017     Priority: Medium     Old tear of medial meniscus of right knee 2017     Priority: Medium     Tubular adenoma of colon 2014     Priority: Medium     Backache 06/10/2013     Priority: Medium     Hyperlipidemia 10/01/2010     Priority: Medium      Past Medical History:   Diagnosis Date     Anemia 3/28/2013     Benign neoplasm of colon     2014,2013-repeat colonoscopy in 2018     Diverticulosis of large intestine without perforation or abscess without bleeding     3/29/2013     Gastritis without bleeding     3/29/2013     Gastro-esophageal reflux disease without esophagitis     No Comments Provided     History of colonic polyps     3/29/2013     Insomnia     No Comments Provided     Postmenopausal bleeding 2021    Added automatically from request for surgery [id number]     Urge incontinence     2011     Past Surgical History:   Procedure Laterality Date     ARTHROPLASTY KNEE Right 2021    Procedure: ARTHROPLASTY, KNEE, TOTAL;  Surgeon: Mario Muhammad MD;  Location:  OR     ARTHROSCOPY KNEE BILATERAL       carpel tunnel surgery - bilateral        SECTION      x 2     COLONOSCOPY  2013    3/29/13,Colonoscopy F/U      DILATION AND CURETTAGE, OPERATIVE HYSTEROSCOPY, COMBINED N/A 2021    Procedure: HYSTEROSCOPY, WITH DILATION AND CURETTAGE OF UTERUS;  Surgeon: Caridad Amaya MD;  Location:  OR     ESOPHAGOSCOPY, GASTROSCOPY, DUODENOSCOPY (EGD),  "COMBINED      3/29 13,EGD     LAPAROSCOPIC TUBAL LIGATION      No Comments Provided     Current Outpatient Medications   Medication Sig Dispense Refill     aspirin 81 MG EC tablet Take 81 mg by mouth daily       calcium carbonate-vitamin D (OSCAL W/D) 500-200 MG-UNIT tablet Take 2 tablets by mouth daily        Multiple Vitamins-Minerals (MULTIVITAMIN ADULT EXTRA C) CHEW Take 2 chew tab by mouth daily - gummy formulation       POTASSIUM PO Take 2 tablets by mouth daily       senna-docusate (SENOKOT-S/PERICOLACE) 8.6-50 MG tablet Take 1-2 tablets by mouth 2 times daily Take while on oral narcotics to prevent or treat constipation. 30 tablet 0       Allergies   Allergen Reactions     Penicillins Rash     Sulfa Drugs Rash        Social History     Tobacco Use     Smoking status: Never Smoker     Smokeless tobacco: Never Used   Substance Use Topics     Alcohol use: No     Alcohol/week: 0.0 standard drinks     Family History   Problem Relation Age of Onset     Diabetes Father         Diabetes     Hypertension Father         Hypertension     Other - See Comments Father         metastatic melanoma     Liver Cancer Father      Heart Disease Mother         Heart Disease,acute MI     Cancer Sister         uterine?     Diabetes Sister      Hypertension Sister      Hypertension Sister      Breast Cancer No family hx of         Cancer-breast     History   Drug Use No         Objective     /84 (BP Location: Right arm, Patient Position: Sitting, Cuff Size: Adult Large)   Pulse 87   Temp 99.4  F (37.4  C) (Tympanic)   Resp 16   Ht 1.562 m (5' 1.5\")   Wt 117.9 kg (260 lb)   LMP 05/25/2018 (Exact Date)   SpO2 97%   Breastfeeding No   BMI 48.33 kg/m      Physical Exam    GENERAL APPEARANCE: healthy, alert and no distress     EYES: EOMI, PERRL     HENT: ear canals and TM's normal and nose and mouth without ulcers or lesions     NECK: no adenopathy, no asymmetry, masses, or scars and thyroid normal to palpation     RESP: " lungs clear to auscultation - no rales, rhonchi or wheezes     CV: regular rates and rhythm, normal S1 S2, no S3 or S4 and no murmur, click or rub     ABDOMEN:  soft, nontender, no HSM or masses and bowel sounds normal     MS: extremities normal- no gross deformities noted, no evidence of inflammation in joints, FROM in all extremities.     SKIN: no suspicious lesions or rashes     NEURO: Normal strength and tone, sensory exam grossly normal, mentation intact and speech normal     PSYCH: mentation appears normal. and affect normal/bright     LYMPHATICS: No cervical adenopathy    Recent Labs   Lab Test 04/08/21  0530 04/07/21  0515 01/27/21  0835 01/07/21  0909   HGB 11.7 12.7 13.2  --    PLT  --   --  250  --    NA  --   --   --  140   POTASSIUM  --   --   --  4.3   CR  --   --   --  0.77        Diagnostics:  Recent Results (from the past 168 hour(s))   EKG 12-lead, tracing only    Collection Time: 08/04/21  3:54 PM   Result Value Ref Range    Systolic Blood Pressure  mmHg    Diastolic Blood Pressure  mmHg    Ventricular Rate 79 BPM    Atrial Rate 79 BPM    OR Interval 148 ms    QRS Duration 94 ms     ms    QTc 467 ms    P Axis 65 degrees    R AXIS 52 degrees    T Axis 39 degrees    Interpretation ECG       Sinus rhythm  Cannot rule out Anterior infarct , age undetermined  Abnormal ECG  When compared with ECG of 20-JAN-2021 16:02,  T wave inversion no longer evident in Inferior leads  Confirmed by DO LUGO STACY ([id number]) on 8/5/2021 6:02:52 AM     UA reflex to Microscopic    Collection Time: 08/04/21  4:00 PM   Result Value Ref Range    Color Urine Light Yellow Colorless, Straw, Light Yellow, Yellow    Appearance Urine Clear Clear    Glucose Urine Negative Negative mg/dL    Bilirubin Urine Negative Negative    Ketones Urine Negative Negative mg/dL    Specific Gravity Urine 1.030 1.000 - 1.030    Blood Urine Negative Negative    pH Urine 6.0 5.0 - 9.0    Protein Albumin Urine Negative Negative mg/dL     Urobilinogen Urine Normal Normal, 2.0 mg/dL    Nitrite Urine Negative Negative    Leukocyte Esterase Urine Negative Negative   Basic Metabolic Panel    Collection Time: 08/04/21  4:00 PM   Result Value Ref Range    Sodium 141 134 - 144 mmol/L    Potassium 4.0 3.5 - 5.1 mmol/L    Chloride 104 98 - 107 mmol/L    Carbon Dioxide (CO2) 28 21 - 31 mmol/L    Anion Gap 9 3 - 14 mmol/L    Urea Nitrogen 25 7 - 25 mg/dL    Creatinine 0.89 0.60 - 1.20 mg/dL    Calcium 9.9 8.6 - 10.3 mg/dL    Glucose 91 70 - 105 mg/dL    GFR Estimate 74 >60 mL/min/1.73m2   Hemoglobin    Collection Time: 08/04/21  4:00 PM   Result Value Ref Range    Hemoglobin 13.6 11.7 - 15.7 g/dL      EKG: Normal Sinus Rhythm, unchanged from previous tracings    Revised Cardiac Risk Index (RCRI):  The patient has the following serious cardiovascular risks for perioperative complications:   - No serious cardiac risks = 0 points     RCRI Interpretation: 0 points: Class I (very low risk - 0.4% complication rate)           Signed Electronically by: Cristal Naranjo PA-C  Copy of this evaluation report is provided to requesting physician.

## 2021-08-04 NOTE — H&P (VIEW-ONLY)
[facility]  [address]  [place] [address]  Phone: [phone]  Fax: [phone]  Primary Provider: Cristal Naranjo  Pre-op Performing Provider: CRISTAL NARANJO      PREOPERATIVE EVALUATION:  Today's date: 8/4/2021    Michelle Du is a 55 year old female who presents for a preoperative evaluation.    Surgical Information:  Surgery/Procedure: ARTHROPLASTY, KNEE, TOTAL  Surgery Location: [facility]  Surgeon: Dr Muhammad   Surgery Date: 8/20/21  Time of Surgery: TBD  Where patient plans to recover: At home with family  Fax number for surgical facility: Note does not need to be faxed, will be available electronically in Epic.    Type of Anesthesia Anticipated: Spinal    Assessment & Plan     The proposed surgical procedure is considered INTERMEDIATE risk.    Problem List Items Addressed This Visit        Nervous and Auditory    Chronic pain of both knees    Relevant Medications    aspirin 81 MG EC tablet    Other Relevant Orders    Hemoglobin (Completed)       Endocrine    Hyperlipidemia    Relevant Orders    EKG 12-lead, tracing only (Completed)    Basic Metabolic Panel (Completed)    UA reflex to Microscopic (Completed)    Hemoglobin (Completed)      Other Visit Diagnoses     Preop general physical exam    -  Primary    Relevant Orders    EKG 12-lead, tracing only (Completed)    Basic Metabolic Panel (Completed)    UA reflex to Microscopic (Completed)    Hemoglobin (Completed)        Completed hemoglobin, urinalysis, and BMP which are stable.  No acute concerns appreciated on the EKG.  Patient has a Covid-19 test set up for 8/16/2021.  No acute concerns at this time.    Risks and Recommendations:  The patient has the following additional risks and recommendations for perioperative complications:   - Morbid obesity (BMI >40)  Obstructive Sleep Apnea:   Patient was instructed to bring her sleep apnea machine with the day of surgery.    Recommended for the patient having  anesthesia consult prior to surgery due to the history of morbid obesity.    Medication Instructions:  Patient Instructions     Patient should take the following medications the morning of surgery with a small sip of water: hold all meds.  Patient was instructed to hold the following medications the morning of surgery: hold all meds.     Patient was advised to call our office and the surgical services with any change in condition or new symptoms if they were to develop between today and their surgical date.  Especially any cardiopulmonary symptoms or symptoms concerning for an infection.     Discontinue aspirin, aleve, naproxen and ibuprofen 7 days prior to surgery to reduce bleeding risk.  It is fine to take tylenol the week before surgery.  Hold vitamins and herbal remedies for 7 days before surgery.    Please have a LEAFER COVID-19 test on 2021.   Please stay in your car and call [phone] when you arrive.      Preparing for Your Surgery  Getting started  A nurse will call you to review your health history and instructions. They will give you an arrival time based on your scheduled surgery time.  Please be ready to share the following:    Your doctor's clinic name and phone number    Your medical, surgical and anesthesia history    A list of allergies and sensitivities    A list of medicines, including herbal treatments and over-the-counter drugs    Whether the patient has a legal guardian (ask how to send us the papers in advance)  If you have a child who's having surgery, please ask for a copy of Preparing for Your Child's Surgery.    Preparing for surgery    Within 30 days of surgery: Have a pre-op exam (sometimes called an H&P, or History and Physical). This can be done at a clinic or pre-operative center.  ? If you're having a , you may not need this exam. Talk to your care team    At your pre-op exam, talk to your care team about all medicines you take. If you need to stop any medicines  before surgery, ask when to start taking them again.  ? We do this for your safety. Many medicines can make you bleed too much during surgery. Some change how well surgery (anesthesia) drugs work.    Call your insurance company to let them know you're having surgery. (If you don't have insurance, call [phone].)    Call your clinic if there's any change in your health. This includes signs of a cold or flu (sore throat, runny nose, cough, rash, fever). It also includes a scrape or scratch near the surgery site.    If you have questions on the day of surgery, call your hospital or surgery center.  Eating and drinking guidelines  For your safety: Unless your surgeon tells you otherwise, follow the guidelines below.    Eat and drink as usual until 8 hours before surgery. After that, no food or milk.    Drink clear liquids until 2 hours before surgery. These are liquids you can see through, like water, Gatorade and Propel Water. You may also have black coffee and tea (no cream or milk).    Nothing by mouth within 2 hours of surgery. This includes gum, candy and breath mints.    If you drink, stop drinking alcohol the night before surgery.    If your care team tells you to take medicine on the morning of surgery, it's okay to take it with a sip of water.  Preventing infection    Shower or bathe the night before and morning of your surgery. Follow the instructions your clinic gave you. (If no instructions, use regular soap.)    Don't shave or clip hair near your surgery site. We'll remove the hair if needed.    Don't smoke or vape the morning of surgery. You may chew nicotine gum up to 2 hours before surgery. A nicotine patch is okay.  ? Note: Some surgeries require you to completely quit smoking and nicotine. Check with your surgeon.    Your care team will make every effort to keep you safe from infection. We will:  ? Clean our hands often with soap and water (or an alcohol-based hand rub).  ? Clean the skin at your  surgery site with a special soap that kills germs.  ? Give you a special gown to keep you warm. (Cold raises the risk of infection.)  ? Wear special hair covers, masks, gowns and gloves during surgery.  ? Give antibiotic medicine, if prescribed. Not all surgeries need antibiotics.  What to bring on the day of surgery    Photo ID and insurance card    Copy of your health care directive, if you have one    Glasses and hearing aides (bring cases)  ? You can't wear contacts during surgery    Inhaler and eye drops, if you use them (tell us about these when you arrive)    CPAP machine or breathing device, if you use them    A few personal items, if spending the night    If you have . . .  ? A pacemaker or ICD (cardiac defibrillator): Bring the ID card.  ? An implanted stimulator: Bring the remote control.  ? A legal guardian: Bring a copy of the certified (court-stamped) guardianship papers.  Please remove any jewelry, including body piercings. Leave jewelry and other valuables at home.  If you're going home the day of surgery  Important: If you don't follow the rules below, we must cancel your surgery.     Arrange for someone to drive you home after surgery. You may not drive, take a taxi or take public transportation by yourself (unless you'll have local anesthesia only).    Arrange for a responsible adult to stay with you overnight. If you don't, we may keep you in the hospital overnight, and you may need to pay the costs yourself.  Questions?   If you have any questions for your care team, list them here: _________________________________________________________________________________________________________________________________________________________________________________________________________________________________________________________________________________________________________________________  For informational purposes only. Not to replace the advice of your health care provider. Copyright    2003, 2019 [facility]. All rights reserved. Clinically reviewed by Nereida Hernandez MD. [employer] [id number] - REV 4/20.       RECOMMENDATION:  APPROVAL GIVEN to proceed with proposed procedure, without further diagnostic evaluation.    Recommended for the patient to have an anesthesia consult prior to surgery with the history of morbid obesity.    30 minutes spent on the date of the encounter doing chart review, history and exam, documentation and further activities per the note        Subjective     HPI related to upcoming procedure: Patient has history of bilateral knee pain.  Patient is currently status post right knee replacement.  Her left knee is currently flaring.  Has significant arthrosis of the knee.  Scheduled for left knee replacement.    Preop Questions 8/4/2021   1. Have you ever had a heart attack or stroke? No   2. Have you ever had surgery on your heart or blood vessels, such as a stent placement, a coronary artery bypass, or surgery on an artery in your head, neck, heart, or legs? No   3. Do you have chest pain with activity? No   4. Do you have a history of  heart failure? No   5. Do you currently have a cold, bronchitis or symptoms of other infection? No   6. Do you have a cough, shortness of breath, or wheezing? No   7. Do you or anyone in your family have previous history of blood clots? No   8. Do you or does anyone in your family have a serious bleeding problem such as prolonged bleeding following surgeries or cuts? No   9. Have you ever had problems with anemia or been told to take iron pills? YES - many years ago, unsure of cause. None since then   10. Have you had any abnormal blood loss such as black, tarry or bloody stools, or abnormal vaginal bleeding? No   11. Have you ever had a blood transfusion? YES - many years ago, unsure of cause. None since then   11a. Have you ever had a transfusion reaction? No   12. Are you willing to have a blood transfusion if it is medically needed  before, during, or after your surgery? Yes   13. Have you or any of your relatives ever had problems with anesthesia? No   14. Do you have sleep apnea, excessive snoring or daytime drowsiness? YES - have a sleep apnea machine   14a. Do you have a CPAP machine? Yes   15. Do you have any artifical heart valves or other implanted medical devices like a pacemaker, defibrillator, or continuous glucose monitor? No   16. Do you have artificial joints? Right knee   17. Are you allergic to latex? No   18. Is there any chance that you may be pregnant? No       Health Care Directive:  Patient has a Health Care Directive on file      Preoperative Review of :   reviewed - controlled substances prescribed by other outside provider(s).      Status of Chronic Conditions:  HYPERLIPIDEMIA - Patient has a long history of hyperlipidemia.  Currently diet controlled.  Patient also has history of morbid obesity.    Patient has tolerated surgery well in the past.  Patient has no recent cough or cold symptoms.  No recent fevers, chills, sore throat, ear pain, chest pain, palpitations, problems breathing, stomachaches, nausea, vomiting, diarrhea, constipation, blood in stool or urine, dysuria, frequency, urgency.    Patient can walk up a flight of stairs without becoming short of breath or having chest pain: YES   Patient is able to tolerate greater than 4 METs of activity without any cardiopulmonary symptoms.      Review of Systems  CONSTITUTIONAL: NEGATIVE for fever, chills, change in weight  INTEGUMENTARY/SKIN: NEGATIVE for worrisome rashes, moles or lesions  EYES: NEGATIVE for vision changes or irritation  ENT/MOUTH: NEGATIVE for ear, mouth and throat problems  RESP: NEGATIVE for significant cough or SOB  CV: NEGATIVE for chest pain, palpitations or peripheral edema  GI: NEGATIVE for nausea, abdominal pain, heartburn, or change in bowel habits  : NEGATIVE for frequency, dysuria, or hematuria  MUSCULOSKELETAL: NEGATIVE for  significant arthralgias or myalgia  NEURO: NEGATIVE for weakness, dizziness or paresthesias  ENDOCRINE: NEGATIVE for temperature intolerance, skin/hair changes  HEME: NEGATIVE for bleeding problems  PSYCHIATRIC: NEGATIVE for changes in mood or affect    Patient Active Problem List    Diagnosis Date Noted     Chronic pain of both knees 2021     Priority: Medium     Added automatically from request for surgery [id number]       Morbid obesity (H) 2021     Priority: Medium     Hypovitaminosis D 2017     Priority: Medium     Old tear of medial meniscus of right knee 2017     Priority: Medium     Tubular adenoma of colon 2014     Priority: Medium     Backache 06/10/2013     Priority: Medium     Hyperlipidemia 10/01/2010     Priority: Medium      Past Medical History:   Diagnosis Date     Anemia 3/28/2013     Benign neoplasm of colon     2014,2013-repeat colonoscopy in 2018     Diverticulosis of large intestine without perforation or abscess without bleeding     3/29/2013     Gastritis without bleeding     3/29/2013     Gastro-esophageal reflux disease without esophagitis     No Comments Provided     History of colonic polyps     3/29/2013     Insomnia     No Comments Provided     Postmenopausal bleeding 2021    Added automatically from request for surgery [id number]     Urge incontinence     2011     Past Surgical History:   Procedure Laterality Date     ARTHROPLASTY KNEE Right 2021    Procedure: ARTHROPLASTY, KNEE, TOTAL;  Surgeon: Mario Muhammad MD;  Location:  OR     ARTHROSCOPY KNEE BILATERAL       carpel tunnel surgery - bilateral        SECTION      x 2     COLONOSCOPY  2013    3/29/13,Colonoscopy F/U      DILATION AND CURETTAGE, OPERATIVE HYSTEROSCOPY, COMBINED N/A 2021    Procedure: HYSTEROSCOPY, WITH DILATION AND CURETTAGE OF UTERUS;  Surgeon: Caridad Amaya MD;  Location:  OR     ESOPHAGOSCOPY, GASTROSCOPY, DUODENOSCOPY (EGD),  "COMBINED      3/29 13,EGD     LAPAROSCOPIC TUBAL LIGATION      No Comments Provided     Current Outpatient Medications   Medication Sig Dispense Refill     aspirin 81 MG EC tablet Take 81 mg by mouth daily       calcium carbonate-vitamin D (OSCAL W/D) 500-200 MG-UNIT tablet Take 2 tablets by mouth daily        Multiple Vitamins-Minerals (MULTIVITAMIN ADULT EXTRA C) CHEW Take 2 chew tab by mouth daily - gummy formulation       POTASSIUM PO Take 2 tablets by mouth daily       senna-docusate (SENOKOT-S/PERICOLACE) 8.6-50 MG tablet Take 1-2 tablets by mouth 2 times daily Take while on oral narcotics to prevent or treat constipation. 30 tablet 0       Allergies   Allergen Reactions     Penicillins Rash     Sulfa Drugs Rash        Social History     Tobacco Use     Smoking status: Never Smoker     Smokeless tobacco: Never Used   Substance Use Topics     Alcohol use: No     Alcohol/week: 0.0 standard drinks     Family History   Problem Relation Age of Onset     Diabetes Father         Diabetes     Hypertension Father         Hypertension     Other - See Comments Father         metastatic melanoma     Liver Cancer Father      Heart Disease Mother         Heart Disease,acute MI     Cancer Sister         uterine?     Diabetes Sister      Hypertension Sister      Hypertension Sister      Breast Cancer No family hx of         Cancer-breast     History   Drug Use No         Objective     /84 (BP Location: Right arm, Patient Position: Sitting, Cuff Size: Adult Large)   Pulse 87   Temp 99.4  F (37.4  C) (Tympanic)   Resp 16   Ht 1.562 m (5' 1.5\")   Wt 117.9 kg (260 lb)   LMP 05/25/2018 (Exact Date)   SpO2 97%   Breastfeeding No   BMI 48.33 kg/m      Physical Exam    GENERAL APPEARANCE: healthy, alert and no distress     EYES: EOMI, PERRL     HENT: ear canals and TM's normal and nose and mouth without ulcers or lesions     NECK: no adenopathy, no asymmetry, masses, or scars and thyroid normal to palpation     RESP: " lungs clear to auscultation - no rales, rhonchi or wheezes     CV: regular rates and rhythm, normal S1 S2, no S3 or S4 and no murmur, click or rub     ABDOMEN:  soft, nontender, no HSM or masses and bowel sounds normal     MS: extremities normal- no gross deformities noted, no evidence of inflammation in joints, FROM in all extremities.     SKIN: no suspicious lesions or rashes     NEURO: Normal strength and tone, sensory exam grossly normal, mentation intact and speech normal     PSYCH: mentation appears normal. and affect normal/bright     LYMPHATICS: No cervical adenopathy    Recent Labs   Lab Test 04/08/21  0530 04/07/21  0515 01/27/21  0835 01/07/21  0909   HGB 11.7 12.7 13.2  --    PLT  --   --  250  --    NA  --   --   --  140   POTASSIUM  --   --   --  4.3   CR  --   --   --  0.77        Diagnostics:  Recent Results (from the past 168 hour(s))   EKG 12-lead, tracing only    Collection Time: 08/04/21  3:54 PM   Result Value Ref Range    Systolic Blood Pressure  mmHg    Diastolic Blood Pressure  mmHg    Ventricular Rate 79 BPM    Atrial Rate 79 BPM    AL Interval 148 ms    QRS Duration 94 ms     ms    QTc 467 ms    P Axis 65 degrees    R AXIS 52 degrees    T Axis 39 degrees    Interpretation ECG       Sinus rhythm  Cannot rule out Anterior infarct , age undetermined  Abnormal ECG  When compared with ECG of 20-JAN-2021 16:02,  T wave inversion no longer evident in Inferior leads  Confirmed by DO LUGO STACY ([id number]) on 8/5/2021 6:02:52 AM     UA reflex to Microscopic    Collection Time: 08/04/21  4:00 PM   Result Value Ref Range    Color Urine Light Yellow Colorless, Straw, Light Yellow, Yellow    Appearance Urine Clear Clear    Glucose Urine Negative Negative mg/dL    Bilirubin Urine Negative Negative    Ketones Urine Negative Negative mg/dL    Specific Gravity Urine 1.030 1.000 - 1.030    Blood Urine Negative Negative    pH Urine 6.0 5.0 - 9.0    Protein Albumin Urine Negative Negative mg/dL     Urobilinogen Urine Normal Normal, 2.0 mg/dL    Nitrite Urine Negative Negative    Leukocyte Esterase Urine Negative Negative   Basic Metabolic Panel    Collection Time: 08/04/21  4:00 PM   Result Value Ref Range    Sodium 141 134 - 144 mmol/L    Potassium 4.0 3.5 - 5.1 mmol/L    Chloride 104 98 - 107 mmol/L    Carbon Dioxide (CO2) 28 21 - 31 mmol/L    Anion Gap 9 3 - 14 mmol/L    Urea Nitrogen 25 7 - 25 mg/dL    Creatinine 0.89 0.60 - 1.20 mg/dL    Calcium 9.9 8.6 - 10.3 mg/dL    Glucose 91 70 - 105 mg/dL    GFR Estimate 74 >60 mL/min/1.73m2   Hemoglobin    Collection Time: 08/04/21  4:00 PM   Result Value Ref Range    Hemoglobin 13.6 11.7 - 15.7 g/dL      EKG: Normal Sinus Rhythm, unchanged from previous tracings    Revised Cardiac Risk Index (RCRI):  The patient has the following serious cardiovascular risks for perioperative complications:   - No serious cardiac risks = 0 points     RCRI Interpretation: 0 points: Class I (very low risk - 0.4% complication rate)           Signed Electronically by: Cristal Naranjo PA-C  Copy of this evaluation report is provided to requesting physician.

## 2021-08-05 ENCOUNTER — MYC MEDICAL ADVICE (OUTPATIENT)
Dept: FAMILY MEDICINE | Facility: OTHER | Age: 56
End: 2021-08-05

## 2021-08-05 LAB
ATRIAL RATE - MUSE: 79 BPM
DIASTOLIC BLOOD PRESSURE - MUSE: NORMAL MMHG
INTERPRETATION ECG - MUSE: NORMAL
P AXIS - MUSE: 65 DEGREES
PR INTERVAL - MUSE: 148 MS
QRS DURATION - MUSE: 94 MS
QT - MUSE: 408 MS
QTC - MUSE: 467 MS
R AXIS - MUSE: 52 DEGREES
SYSTOLIC BLOOD PRESSURE - MUSE: NORMAL MMHG
T AXIS - MUSE: 39 DEGREES
VENTRICULAR RATE- MUSE: 79 BPM

## 2021-08-06 NOTE — TELEPHONE ENCOUNTER
Noted provider sent message to patient in my chart message.   Zahida Iverson RN ,....................  8/6/2021   8:34 AM

## 2021-08-16 ENCOUNTER — ALLIED HEALTH/NURSE VISIT (OUTPATIENT)
Dept: FAMILY MEDICINE | Facility: OTHER | Age: 56
End: 2021-08-16
Attending: FAMILY MEDICINE
Payer: COMMERCIAL

## 2021-08-16 DIAGNOSIS — Z20.822 COVID-19 RULED OUT: Primary | ICD-10-CM

## 2021-08-16 LAB — SARS-COV-2 RNA RESP QL NAA+PROBE: NEGATIVE

## 2021-08-16 PROCEDURE — C9803 HOPD COVID-19 SPEC COLLECT: HCPCS

## 2021-08-16 PROCEDURE — U0003 INFECTIOUS AGENT DETECTION BY NUCLEIC ACID (DNA OR RNA); SEVERE ACUTE RESPIRATORY SYNDROME CORONAVIRUS 2 (SARS-COV-2) (CORONAVIRUS DISEASE [COVID-19]), AMPLIFIED PROBE TECHNIQUE, MAKING USE OF HIGH THROUGHPUT TECHNOLOGIES AS DESCRIBED BY CMS-2020-01-R: HCPCS | Mod: ZL

## 2021-08-19 ENCOUNTER — ANESTHESIA EVENT (OUTPATIENT)
Dept: SURGERY | Facility: OTHER | Age: 56
End: 2021-08-19
Payer: COMMERCIAL

## 2021-08-20 ENCOUNTER — SURGERY (OUTPATIENT)
Age: 56
End: 2021-08-20
Payer: COMMERCIAL

## 2021-08-20 ENCOUNTER — APPOINTMENT (OUTPATIENT)
Dept: GENERAL RADIOLOGY | Facility: OTHER | Age: 56
End: 2021-08-20
Attending: ORTHOPAEDIC SURGERY
Payer: COMMERCIAL

## 2021-08-20 ENCOUNTER — ANESTHESIA (OUTPATIENT)
Dept: SURGERY | Facility: OTHER | Age: 56
End: 2021-08-20
Payer: COMMERCIAL

## 2021-08-20 ENCOUNTER — HOSPITAL ENCOUNTER (OUTPATIENT)
Facility: OTHER | Age: 56
Discharge: HOME | End: 2021-08-21
Attending: ORTHOPAEDIC SURGERY | Admitting: FAMILY MEDICINE
Payer: COMMERCIAL

## 2021-08-20 DIAGNOSIS — M25.562 CHRONIC PAIN OF BOTH KNEES: ICD-10-CM

## 2021-08-20 DIAGNOSIS — M25.561 CHRONIC PAIN OF BOTH KNEES: ICD-10-CM

## 2021-08-20 DIAGNOSIS — G89.29 CHRONIC PAIN OF BOTH KNEES: ICD-10-CM

## 2021-08-20 PROBLEM — G47.33 OSA (OBSTRUCTIVE SLEEP APNEA): Status: ACTIVE | Noted: 2021-08-20

## 2021-08-20 PROCEDURE — 370N000017 HC ANESTHESIA TECHNICAL FEE, PER MIN: Performed by: ORTHOPAEDIC SURGERY

## 2021-08-20 PROCEDURE — C1776 JOINT DEVICE (IMPLANTABLE): HCPCS | Performed by: ORTHOPAEDIC SURGERY

## 2021-08-20 PROCEDURE — 99213 OFFICE O/P EST LOW 20 MIN: CPT | Performed by: FAMILY MEDICINE

## 2021-08-20 PROCEDURE — 120N000001 HC R&B MED SURG/OB

## 2021-08-20 PROCEDURE — 27447 TOTAL KNEE ARTHROPLASTY: CPT | Mod: LT | Performed by: ORTHOPAEDIC SURGERY

## 2021-08-20 PROCEDURE — 258N000001 HC RX 258: Performed by: ORTHOPAEDIC SURGERY

## 2021-08-20 PROCEDURE — 96374 THER/PROPH/DIAG INJ IV PUSH: CPT | Mod: XU

## 2021-08-20 PROCEDURE — 250N000011 HC RX IP 250 OP 636: Performed by: NURSE ANESTHETIST, CERTIFIED REGISTERED

## 2021-08-20 PROCEDURE — 360N000077 HC SURGERY LEVEL 4, PER MIN: Performed by: ORTHOPAEDIC SURGERY

## 2021-08-20 PROCEDURE — 73560 X-RAY EXAM OF KNEE 1 OR 2: CPT | Mod: LT

## 2021-08-20 PROCEDURE — 250N000009 HC RX 250: Performed by: NURSE ANESTHETIST, CERTIFIED REGISTERED

## 2021-08-20 PROCEDURE — 76942 ECHO GUIDE FOR BIOPSY: CPT | Mod: 26 | Performed by: NURSE ANESTHETIST, CERTIFIED REGISTERED

## 2021-08-20 PROCEDURE — 96375 TX/PRO/DX INJ NEW DRUG ADDON: CPT | Mod: XU

## 2021-08-20 PROCEDURE — 64447 NJX AA&/STRD FEMORAL NRV IMG: CPT | Mod: RT | Performed by: NURSE ANESTHETIST, CERTIFIED REGISTERED

## 2021-08-20 PROCEDURE — 250N000013 HC RX MED GY IP 250 OP 250 PS 637: Performed by: ORTHOPAEDIC SURGERY

## 2021-08-20 PROCEDURE — 272N000002 HC OR SUPPLY OTHER OPNP: Performed by: ORTHOPAEDIC SURGERY

## 2021-08-20 PROCEDURE — 258N000003 HC RX IP 258 OP 636: Performed by: NURSE ANESTHETIST, CERTIFIED REGISTERED

## 2021-08-20 PROCEDURE — 27447 TOTAL KNEE ARTHROPLASTY: CPT | Performed by: NURSE ANESTHETIST, CERTIFIED REGISTERED

## 2021-08-20 PROCEDURE — 250N000011 HC RX IP 250 OP 636: Performed by: ORTHOPAEDIC SURGERY

## 2021-08-20 PROCEDURE — 999N000157 HC STATISTIC RCP TIME EA 10 MIN

## 2021-08-20 PROCEDURE — 278N000051 HC OR IMPLANT GENERAL: Performed by: ORTHOPAEDIC SURGERY

## 2021-08-20 PROCEDURE — 272N000001 HC OR GENERAL SUPPLY STERILE: Performed by: ORTHOPAEDIC SURGERY

## 2021-08-20 PROCEDURE — 710N000010 HC RECOVERY PHASE 1, LEVEL 2, PER MIN: Performed by: ORTHOPAEDIC SURGERY

## 2021-08-20 PROCEDURE — 999N000141 HC STATISTIC PRE-PROCEDURE NURSING ASSESSMENT: Performed by: ORTHOPAEDIC SURGERY

## 2021-08-20 DEVICE — IMPLANTABLE DEVICE
Type: IMPLANTABLE DEVICE | Site: KNEE | Status: FUNCTIONAL
Brand: PERSONA® VIVACIT-E®

## 2021-08-20 DEVICE — IMPLANTABLE DEVICE
Type: IMPLANTABLE DEVICE | Site: KNEE | Status: FUNCTIONAL
Brand: PERSONA®

## 2021-08-20 DEVICE — TOBRA FULL DOSE ANTIBIOTIC BONE CEMENT, 10 PACK CATALOG NUMBER IS 6197-9-010
Type: IMPLANTABLE DEVICE | Site: KNEE | Status: FUNCTIONAL
Brand: SIMPLEX

## 2021-08-20 DEVICE — IMPLANTABLE DEVICE
Type: IMPLANTABLE DEVICE | Site: KNEE | Status: FUNCTIONAL
Brand: PERSONA® NATURAL TIBIA®

## 2021-08-20 RX ORDER — OXYCODONE HYDROCHLORIDE 5 MG/1
5 TABLET ORAL EVERY 4 HOURS PRN
Status: DISCONTINUED | OUTPATIENT
Start: 2021-08-20 | End: 2021-08-21 | Stop reason: HOSPADM

## 2021-08-20 RX ORDER — LIDOCAINE HYDROCHLORIDE 10 MG/ML
INJECTION, SOLUTION INFILTRATION; PERINEURAL PRN
Status: DISCONTINUED | OUTPATIENT
Start: 2021-08-20 | End: 2021-08-20

## 2021-08-20 RX ORDER — LIDOCAINE 40 MG/G
CREAM TOPICAL
Status: DISCONTINUED | OUTPATIENT
Start: 2021-08-20 | End: 2021-08-20 | Stop reason: HOSPADM

## 2021-08-20 RX ORDER — ACETAMINOPHEN 325 MG/1
650 TABLET ORAL EVERY 4 HOURS PRN
Status: DISCONTINUED | OUTPATIENT
Start: 2021-08-23 | End: 2021-08-21 | Stop reason: HOSPADM

## 2021-08-20 RX ORDER — NALOXONE HYDROCHLORIDE 0.4 MG/ML
0.4 INJECTION, SOLUTION INTRAMUSCULAR; INTRAVENOUS; SUBCUTANEOUS
Status: DISCONTINUED | OUTPATIENT
Start: 2021-08-20 | End: 2021-08-21 | Stop reason: HOSPADM

## 2021-08-20 RX ORDER — PROCHLORPERAZINE MALEATE 10 MG
10 TABLET ORAL EVERY 6 HOURS PRN
Status: DISCONTINUED | OUTPATIENT
Start: 2021-08-20 | End: 2021-08-21 | Stop reason: HOSPADM

## 2021-08-20 RX ORDER — TRANEXAMIC ACID 650 MG/1
1950 TABLET ORAL ONCE
Status: COMPLETED | OUTPATIENT
Start: 2021-08-20 | End: 2021-08-20

## 2021-08-20 RX ORDER — FENTANYL CITRATE 50 UG/ML
50 INJECTION, SOLUTION INTRAMUSCULAR; INTRAVENOUS EVERY 5 MIN PRN
Status: DISCONTINUED | OUTPATIENT
Start: 2021-08-20 | End: 2021-08-20 | Stop reason: HOSPADM

## 2021-08-20 RX ORDER — HYDROMORPHONE HYDROCHLORIDE 1 MG/ML
0.2 INJECTION, SOLUTION INTRAMUSCULAR; INTRAVENOUS; SUBCUTANEOUS
Status: DISCONTINUED | OUTPATIENT
Start: 2021-08-20 | End: 2021-08-21 | Stop reason: HOSPADM

## 2021-08-20 RX ORDER — AMOXICILLIN 250 MG
1 CAPSULE ORAL 2 TIMES DAILY
Status: DISCONTINUED | OUTPATIENT
Start: 2021-08-20 | End: 2021-08-21 | Stop reason: HOSPADM

## 2021-08-20 RX ORDER — POLYETHYLENE GLYCOL 3350 17 G/17G
17 POWDER, FOR SOLUTION ORAL DAILY
Status: DISCONTINUED | OUTPATIENT
Start: 2021-08-21 | End: 2021-08-21 | Stop reason: HOSPADM

## 2021-08-20 RX ORDER — LIDOCAINE 40 MG/G
CREAM TOPICAL
Status: DISCONTINUED | OUTPATIENT
Start: 2021-08-20 | End: 2021-08-21 | Stop reason: HOSPADM

## 2021-08-20 RX ORDER — CLINDAMYCIN PHOSPHATE 900 MG/50ML
900 INJECTION, SOLUTION INTRAVENOUS SEE ADMIN INSTRUCTIONS
Status: DISCONTINUED | OUTPATIENT
Start: 2021-08-20 | End: 2021-08-20 | Stop reason: HOSPADM

## 2021-08-20 RX ORDER — OXYCODONE HYDROCHLORIDE 5 MG/1
5 TABLET ORAL EVERY 4 HOURS PRN
Status: DISCONTINUED | OUTPATIENT
Start: 2021-08-20 | End: 2021-08-20 | Stop reason: HOSPADM

## 2021-08-20 RX ORDER — ACETAMINOPHEN 325 MG/1
975 TABLET ORAL EVERY 8 HOURS
Status: DISCONTINUED | OUTPATIENT
Start: 2021-08-20 | End: 2021-08-21 | Stop reason: HOSPADM

## 2021-08-20 RX ORDER — BISACODYL 10 MG
10 SUPPOSITORY, RECTAL RECTAL DAILY PRN
Status: DISCONTINUED | OUTPATIENT
Start: 2021-08-20 | End: 2021-08-21 | Stop reason: HOSPADM

## 2021-08-20 RX ORDER — CLINDAMYCIN PHOSPHATE 900 MG/50ML
900 INJECTION, SOLUTION INTRAVENOUS EVERY 8 HOURS
Status: COMPLETED | OUTPATIENT
Start: 2021-08-20 | End: 2021-08-21

## 2021-08-20 RX ORDER — NALOXONE HYDROCHLORIDE 0.4 MG/ML
0.2 INJECTION, SOLUTION INTRAMUSCULAR; INTRAVENOUS; SUBCUTANEOUS
Status: DISCONTINUED | OUTPATIENT
Start: 2021-08-20 | End: 2021-08-21 | Stop reason: HOSPADM

## 2021-08-20 RX ORDER — ONDANSETRON 2 MG/ML
4 INJECTION INTRAMUSCULAR; INTRAVENOUS EVERY 6 HOURS PRN
Status: DISCONTINUED | OUTPATIENT
Start: 2021-08-20 | End: 2021-08-21 | Stop reason: HOSPADM

## 2021-08-20 RX ORDER — SODIUM CHLORIDE, SODIUM LACTATE, POTASSIUM CHLORIDE, CALCIUM CHLORIDE 600; 310; 30; 20 MG/100ML; MG/100ML; MG/100ML; MG/100ML
INJECTION, SOLUTION INTRAVENOUS CONTINUOUS
Status: DISCONTINUED | OUTPATIENT
Start: 2021-08-20 | End: 2021-08-20 | Stop reason: HOSPADM

## 2021-08-20 RX ORDER — ACETAMINOPHEN 325 MG/1
975 TABLET ORAL ONCE
Status: COMPLETED | OUTPATIENT
Start: 2021-08-20 | End: 2021-08-20

## 2021-08-20 RX ORDER — BUPIVACAINE HYDROCHLORIDE 5 MG/ML
INJECTION, SOLUTION EPIDURAL; INTRACAUDAL PRN
Status: DISCONTINUED | OUTPATIENT
Start: 2021-08-20 | End: 2021-08-20

## 2021-08-20 RX ORDER — SODIUM CHLORIDE, SODIUM LACTATE, POTASSIUM CHLORIDE, CALCIUM CHLORIDE 600; 310; 30; 20 MG/100ML; MG/100ML; MG/100ML; MG/100ML
INJECTION, SOLUTION INTRAVENOUS CONTINUOUS
Status: DISCONTINUED | OUTPATIENT
Start: 2021-08-20 | End: 2021-08-21 | Stop reason: HOSPADM

## 2021-08-20 RX ORDER — BUPIVACAINE HYDROCHLORIDE 2.5 MG/ML
INJECTION, SOLUTION EPIDURAL; INFILTRATION; INTRACAUDAL PRN
Status: DISCONTINUED | OUTPATIENT
Start: 2021-08-20 | End: 2021-08-20

## 2021-08-20 RX ORDER — HYDROMORPHONE HYDROCHLORIDE 1 MG/ML
0.4 INJECTION, SOLUTION INTRAMUSCULAR; INTRAVENOUS; SUBCUTANEOUS EVERY 5 MIN PRN
Status: DISCONTINUED | OUTPATIENT
Start: 2021-08-20 | End: 2021-08-20 | Stop reason: HOSPADM

## 2021-08-20 RX ORDER — GLYCOPYRROLATE 0.2 MG/ML
INJECTION, SOLUTION INTRAMUSCULAR; INTRAVENOUS PRN
Status: DISCONTINUED | OUTPATIENT
Start: 2021-08-20 | End: 2021-08-20

## 2021-08-20 RX ORDER — AMOXICILLIN 250 MG
1-2 CAPSULE ORAL 2 TIMES DAILY
Status: DISCONTINUED | OUTPATIENT
Start: 2021-08-20 | End: 2021-08-20

## 2021-08-20 RX ORDER — HYDROXYZINE PAMOATE 25 MG/1
25 CAPSULE ORAL EVERY 6 HOURS PRN
Status: DISCONTINUED | OUTPATIENT
Start: 2021-08-20 | End: 2021-08-21 | Stop reason: HOSPADM

## 2021-08-20 RX ORDER — MULTIPLE VITAMINS W/ MINERALS TAB 9MG-400MCG
1 TAB ORAL DAILY
Status: DISCONTINUED | OUTPATIENT
Start: 2021-08-21 | End: 2021-08-21 | Stop reason: HOSPADM

## 2021-08-20 RX ORDER — HYDROMORPHONE HYDROCHLORIDE 1 MG/ML
0.4 INJECTION, SOLUTION INTRAMUSCULAR; INTRAVENOUS; SUBCUTANEOUS
Status: DISCONTINUED | OUTPATIENT
Start: 2021-08-20 | End: 2021-08-21 | Stop reason: HOSPADM

## 2021-08-20 RX ORDER — GLYCINE 1.5 G/100ML
SOLUTION IRRIGATION PRN
Status: DISCONTINUED | OUTPATIENT
Start: 2021-08-20 | End: 2021-08-20 | Stop reason: HOSPADM

## 2021-08-20 RX ORDER — ONDANSETRON 2 MG/ML
4 INJECTION INTRAMUSCULAR; INTRAVENOUS EVERY 30 MIN PRN
Status: DISCONTINUED | OUTPATIENT
Start: 2021-08-20 | End: 2021-08-20 | Stop reason: HOSPADM

## 2021-08-20 RX ORDER — LIDOCAINE HYDROCHLORIDE 20 MG/ML
INJECTION, SOLUTION INFILTRATION; PERINEURAL PRN
Status: DISCONTINUED | OUTPATIENT
Start: 2021-08-20 | End: 2021-08-20

## 2021-08-20 RX ORDER — ONDANSETRON 4 MG/1
4 TABLET, ORALLY DISINTEGRATING ORAL EVERY 30 MIN PRN
Status: DISCONTINUED | OUTPATIENT
Start: 2021-08-20 | End: 2021-08-20 | Stop reason: HOSPADM

## 2021-08-20 RX ORDER — OXYCODONE HYDROCHLORIDE 5 MG/1
10 TABLET ORAL EVERY 4 HOURS PRN
Status: DISCONTINUED | OUTPATIENT
Start: 2021-08-20 | End: 2021-08-21 | Stop reason: HOSPADM

## 2021-08-20 RX ORDER — ONDANSETRON 4 MG/1
4 TABLET, ORALLY DISINTEGRATING ORAL EVERY 6 HOURS PRN
Status: DISCONTINUED | OUTPATIENT
Start: 2021-08-20 | End: 2021-08-21 | Stop reason: HOSPADM

## 2021-08-20 RX ORDER — PROPOFOL 10 MG/ML
INJECTION, EMULSION INTRAVENOUS CONTINUOUS PRN
Status: DISCONTINUED | OUTPATIENT
Start: 2021-08-20 | End: 2021-08-20

## 2021-08-20 RX ORDER — BUPIVACAINE HYDROCHLORIDE 7.5 MG/ML
INJECTION, SOLUTION INTRASPINAL PRN
Status: DISCONTINUED | OUTPATIENT
Start: 2021-08-20 | End: 2021-08-20

## 2021-08-20 RX ORDER — ASPIRIN 81 MG/1
81 TABLET ORAL 2 TIMES DAILY
Status: DISCONTINUED | OUTPATIENT
Start: 2021-08-21 | End: 2021-08-21 | Stop reason: HOSPADM

## 2021-08-20 RX ORDER — CLINDAMYCIN PHOSPHATE 900 MG/50ML
900 INJECTION, SOLUTION INTRAVENOUS
Status: COMPLETED | OUTPATIENT
Start: 2021-08-20 | End: 2021-08-20

## 2021-08-20 RX ORDER — DEXAMETHASONE SODIUM PHOSPHATE 10 MG/ML
INJECTION, SOLUTION INTRAMUSCULAR; INTRAVENOUS PRN
Status: DISCONTINUED | OUTPATIENT
Start: 2021-08-20 | End: 2021-08-20

## 2021-08-20 RX ORDER — KETOROLAC TROMETHAMINE 15 MG/ML
15 INJECTION, SOLUTION INTRAMUSCULAR; INTRAVENOUS EVERY 6 HOURS
Status: COMPLETED | OUTPATIENT
Start: 2021-08-20 | End: 2021-08-21

## 2021-08-20 RX ORDER — ONDANSETRON 2 MG/ML
INJECTION INTRAMUSCULAR; INTRAVENOUS PRN
Status: DISCONTINUED | OUTPATIENT
Start: 2021-08-20 | End: 2021-08-20

## 2021-08-20 RX ADMIN — PHENYLEPHRINE HYDROCHLORIDE 100 MCG: 10 INJECTION INTRAVENOUS at 14:16

## 2021-08-20 RX ADMIN — DEXAMETHASONE SODIUM PHOSPHATE 5 MG: 10 INJECTION, SOLUTION INTRAMUSCULAR; INTRAVENOUS at 13:20

## 2021-08-20 RX ADMIN — DOCUSATE SODIUM 50 MG AND SENNOSIDES 8.6 MG 1 TABLET: 8.6; 5 TABLET, FILM COATED ORAL at 21:14

## 2021-08-20 RX ADMIN — PHENYLEPHRINE HYDROCHLORIDE 100 MCG: 10 INJECTION INTRAVENOUS at 14:34

## 2021-08-20 RX ADMIN — CLINDAMYCIN IN 5 PERCENT DEXTROSE 900 MG: 18 INJECTION, SOLUTION INTRAVENOUS at 13:39

## 2021-08-20 RX ADMIN — ONDANSETRON HYDROCHLORIDE 4 MG: 2 SOLUTION INTRAMUSCULAR; INTRAVENOUS at 14:12

## 2021-08-20 RX ADMIN — PHENYLEPHRINE HYDROCHLORIDE 100 MCG: 10 INJECTION INTRAVENOUS at 14:53

## 2021-08-20 RX ADMIN — HYDROMORPHONE HYDROCHLORIDE 0.4 MG: 1 INJECTION, SOLUTION INTRAMUSCULAR; INTRAVENOUS; SUBCUTANEOUS at 21:17

## 2021-08-20 RX ADMIN — TRANEXAMIC ACID 1950 MG: 650 TABLET ORAL at 11:36

## 2021-08-20 RX ADMIN — LIDOCAINE HYDROCHLORIDE 3 ML: 10 INJECTION, SOLUTION INFILTRATION; PERINEURAL at 13:55

## 2021-08-20 RX ADMIN — SODIUM CHLORIDE 25 MCG: 900 INJECTION INTRAVENOUS at 13:15

## 2021-08-20 RX ADMIN — KETOROLAC TROMETHAMINE 15 MG: 15 INJECTION, SOLUTION INTRAMUSCULAR; INTRAVENOUS at 17:18

## 2021-08-20 RX ADMIN — KETOROLAC TROMETHAMINE 15 MG: 15 INJECTION, SOLUTION INTRAMUSCULAR; INTRAVENOUS at 23:08

## 2021-08-20 RX ADMIN — GLYCOPYRROLATE 0.2 MG: 0.2 INJECTION, SOLUTION INTRAMUSCULAR; INTRAVENOUS at 14:24

## 2021-08-20 RX ADMIN — OXYCODONE HYDROCHLORIDE 10 MG: 5 TABLET ORAL at 19:11

## 2021-08-20 RX ADMIN — PHENYLEPHRINE HYDROCHLORIDE 100 MCG: 10 INJECTION INTRAVENOUS at 14:23

## 2021-08-20 RX ADMIN — BUPIVACAINE HYDROCHLORIDE 20 ML: 5 INJECTION, SOLUTION EPIDURAL; INTRACAUDAL; PERINEURAL at 13:20

## 2021-08-20 RX ADMIN — MIDAZOLAM 6 MG: 1 INJECTION INTRAMUSCULAR; INTRAVENOUS at 13:13

## 2021-08-20 RX ADMIN — SODIUM CHLORIDE 700 ML: 900 IRRIGANT IRRIGATION at 15:12

## 2021-08-20 RX ADMIN — CLINDAMYCIN PHOSPHATE 900 MG: 900 INJECTION, SOLUTION INTRAVENOUS at 21:14

## 2021-08-20 RX ADMIN — DEXAMETHASONE SODIUM PHOSPHATE 5 MG: 10 INJECTION, SOLUTION INTRAMUSCULAR; INTRAVENOUS at 13:15

## 2021-08-20 RX ADMIN — LIDOCAINE HYDROCHLORIDE 60 MG: 20 INJECTION, SOLUTION INFILTRATION; PERINEURAL at 14:12

## 2021-08-20 RX ADMIN — ACETAMINOPHEN 975 MG: 325 TABLET, FILM COATED ORAL at 11:36

## 2021-08-20 RX ADMIN — HYDROXYZINE PAMOATE 25 MG: 25 CAPSULE ORAL at 21:17

## 2021-08-20 RX ADMIN — BUPIVACAINE HYDROCHLORIDE 2 ML: 7.5 INJECTION, SOLUTION INTRASPINAL at 14:05

## 2021-08-20 RX ADMIN — GLYCINE 450 ML: 1.5 SOLUTION IRRIGATION at 15:07

## 2021-08-20 RX ADMIN — OXYCODONE HYDROCHLORIDE 10 MG: 5 TABLET ORAL at 23:08

## 2021-08-20 RX ADMIN — SODIUM CHLORIDE 25 MCG: 900 INJECTION INTRAVENOUS at 13:20

## 2021-08-20 RX ADMIN — ACETAMINOPHEN 975 MG: 325 TABLET, FILM COATED ORAL at 21:14

## 2021-08-20 RX ADMIN — BUPIVACAINE HYDROCHLORIDE 20 ML: 2.5 INJECTION, SOLUTION EPIDURAL; INFILTRATION; INTRACAUDAL; PERINEURAL at 13:15

## 2021-08-20 RX ADMIN — PROPOFOL 50 MCG/KG/MIN: 10 INJECTION, EMULSION INTRAVENOUS at 14:12

## 2021-08-20 RX ADMIN — PHENYLEPHRINE HYDROCHLORIDE 100 MCG: 10 INJECTION INTRAVENOUS at 14:45

## 2021-08-20 RX ADMIN — PHENYLEPHRINE HYDROCHLORIDE 100 MCG: 10 INJECTION INTRAVENOUS at 15:01

## 2021-08-20 ASSESSMENT — ACTIVITIES OF DAILY LIVING (ADL): ADLS_ACUITY_SCORE: 15

## 2021-08-20 ASSESSMENT — MIFFLIN-ST. JEOR: SCORE: 1724.66

## 2021-08-20 NOTE — PHARMACY-ADMISSION MEDICATION HISTORY
Pharmacy -- Admission Medication Reconciliation    Prior to admission (PTA) medications were reviewed and the patient's PTA medication list was updated.    Sources Consulted: Patient Interview,     The reliability of this Medication Reconciliation is: Reliability: Reliable    The following significant changes were made:  Changed Calcium Supplement and Potassium supplement to 1 tab daily from 2 tabs daily.     Patient confirms not on any RX medications.     In addition, the patient's allergies were reviewed with the patient and updated as follows:   Allergies: Penicillins and Sulfa drugs    The pharmacist has reviewed with the patient that all personal medications should be removed from the building or locked in the belongings safe.  Patient shall only take medications ordered by the physician and administered by the nursing staff.       Medication barriers identified: None noted. Patient states that she has insurance.    Medication adherence concerns: None noted, uses pill box.    Understanding of emergency medications: None.     Joel Vega [facility], 8/20/2021,  5:20 PM

## 2021-08-20 NOTE — PROGRESS NOTES
"NS ADMISSION NOTE    Patient admitted to room 353 at approximately 1603 via bed from surgery. Patient was accompanied by nurse.     Verbal SBAR report received from STEVO Pop prior to patient arrival.     Patient alert and oriented X 3. The patient is not having any pain.  . Admission vital signs: Blood pressure 120/75, pulse 59, temperature (!) 96.5  F (35.8  C), temperature source Tympanic, resp. rate 16, height 1.562 m (5' 1.5\"), weight 117.9 kg (260 lb), last menstrual period 05/25/2018, SpO2 95 %, not currently breastfeeding. Patient was oriented to plan of care, call light, bed controls, tv, telephone, bathroom and visiting hours.     Risk Assessment    The following safety risks were identified during admission: fall. Yellow risk band applied: YES.     Alissa Helm RN    "

## 2021-08-20 NOTE — ANESTHESIA PROCEDURE NOTES
Other Procedure Note  Pre-Procedure   Staff -        CRNA: Kellerman, David, APRN CRNA       Performed By: CRNA       Location: pre-op       Procedure Start/Stop Times: 8/20/2021 1:11 PM and 8/20/2021 1:15 PM       Pre-Anesthestic Checklist: patient identified, IV checked, site marked, risks and benefits discussed, informed consent, monitors and equipment checked, pre-op evaluation, at physician/surgeon's request and post-op pain management  Timeout:       Correct Patient: Yes        Correct Procedure: Yes        Correct Site: Yes        Correct Position: Yes        Correct Laterality: Yes        Site Marked: Yes  Procedure Documentation  Procedure: Other       Diagnosis: LEFT KNEE OA       Laterality: left       Patient Position: lateral       Patient Prep/Sterile Barriers: sterile gloves, mask       Skin prep: Chloraprep       Needle Type: short bevel       Needle Gauge: 20.        Needle Length (Inches): 6        Needle Length (millimeters): 80        Ultrasound guided       1. Ultrasound was used to identify targeted nerve, plexus, vascular marker, or fascial plane and place a needle adjacent to it in real-time.       2. Ultrasound was used to visualize the spread of anesthetic in close proximity to the above referenced structure.       3. A permanent image is entered into the patient's record.       4. The visualized anatomic structures appeared normal.       5. There were no apparent abnormal pathologic findings.    Assessment/Narrative         The placement was negative for: blood aspirated, painful injection and site bleeding       Paresthesias: No.     Bolus given via needle..        Secured via.        Insertion/Infusion Method: Single Shot       Complications: none

## 2021-08-20 NOTE — ANESTHESIA POSTPROCEDURE EVALUATION
Patient: Michelle Du    Procedure(s):  ARTHROPLASTY, KNEE, TOTAL    Diagnosis:Chronic pain of both knees [M25.561, M25.562, G89.29]  Diagnosis Additional Information: No value filed.    Anesthesia Type:  Spinal    Note:  Disposition: Inpatient   Postop Pain Control:            Sign Out: Well controlled pain   PONV: No   Neuro/Psych:             Sign Out: Acceptable/Baseline neuro status   Airway/Respiratory:             Sign Out: Acceptable/Baseline resp. status   CV/Hemodynamics:             Sign Out: Acceptable CV status   Other NRE: NONE   DID A NON-ROUTINE EVENT OCCUR? No           Last vitals:  Vitals Value Taken Time   /69 08/20/21 1530   Temp 97.4  F (36.3  C) 08/20/21 1526   Pulse 64 08/20/21 1533   Resp 17 08/20/21 1533   SpO2 93 % 08/20/21 1533   Vitals shown include unvalidated device data.    Electronically Signed By: ROBERTA GROSS CRNA  August 20, 2021  3:34 PM

## 2021-08-20 NOTE — OP NOTE
Procedure Date: 08/20/2021    PREOPERATIVE DIAGNOSIS:  Left knee degenerative arthrosis with severe varus deformity.    POSTOPERATIVE DIAGNOSIS:  Left knee degenerative arthrosis with severe varus deformity.    PROCEDURE:  Left total knee replacement, Jericho Persona posterior stabilized.    SURGEON:  Mario Muhammad MD    ASSISTANT:  Bony Bradley PA-C A skilled first assistant was necessary for position, intraoperative decision making, retraction and exposure during the procedure.  Furthermore, a skilled first assistant was necessary to complete the procedure in a technically safe and efficient manner.    ANESTHESIA:  Spinal with block.    COMPLICATIONS:  Without.    IMPLANTS:    1.  Jericho Persona size 7 narrow posterior stabilized femoral component.  2.  D baseplate.  3.  10 PS poly.  4.  A 32 patella.    INDICATIONS FOR PROCEDURE:  Ms Du is a 55-year-old with history of left knee pain, progressive in nature, unresponsive to conservative measures.  Recommendation was for total joint reconstruction.  The patient did elect to proceed following a discussion of the risks and benefits.    DESCRIPTION OF PROCEDURE:  The patient was taken to the operative suite and administered anesthesia.  Following spinalization, the left lower extremity prepped and draped in normal sterile fashion.  Preoperative antibiotics administered and timeout was called.  At this point in time a midline incision was made.  Dissection carried down to extensor mechanism.  Medial parapatellar approach was then performed.  Medial and lateral menisci removed.  Proper retractors placed about the knee.  Anterior start point was identified.  Drill was used to gain access.  Distal valgus cut set at 5 degrees performed a distal valgus cut.  After that was complete, we then sized the patient for a size 7.  Pinned this in 3 degrees of external rotation using posterior reference and applied the 4-in-1 cut block to the distal cut surface and performed  anterior cut, posterior cut, and chamfer cuts.  Following femoral preparation, extramedullary guide was utilized on the tibial side in proper varus valgus alignment reestablished, 2 mm resection of the medial aspect was done.  Once complete checked our gap balance and had good gap balance here with a 10 mm block.  We sized the tibia for size D centered this over the medial 1/3 tubercle, secured this down used the drill, followed by the keel punch.  We then medial and lateralized the femoral component, cut our box for the PS.  After that was complete, we then trialed the 10 PS poly, felt with good flexion and extension as well as varus and valgus plane stability with that, then resurfaced the kneecap and drilled for a 32 mm component.  All trial components were removed.  Final meniscal resections were carried out.  Hemostasis was achieved.  Once complete, we then mixed cement with tobramycin applied cement to the tibial component, followed by application cement to the femur and then followed by implantation of femoral component.  All excess cement removed and the implant the polyethylene liner and cement application backside of the patella and implantation of patellar component.  Once the cement had cured, we copiously irrigated the knee itself, once again, closed the retinacular incision with #1 Vicryl followed by 2-0 Vicryl, skin staples, Dermabond and Aquacel dressing.  The patient then was transferred to recovery in stable condition.    POSTOPERATIVE PLAN:  Discharge home, potentially postop day 1 if does well and oxycodone for pain management and aspirin for DVT prophylaxis.  She is going to plan to go to outpatient therapies at [facility] at this time.    Mario Muhammad MD        D: 2021   T: 2021   MT: [id number]    Name:     ASH GALVAN  MRN:      [id number]        Account:        [id number]   :      1965           Procedure Date: 2021     Document: [id number]

## 2021-08-20 NOTE — OR NURSING
PACU Transfer Note    Michelle Du was transferred to [facility] via bed.  Equipment used for transport:  none.  Accompanied by:  RN  Prescriptions were: none    PACU Respiratory Event Documentation     1) Episodes of Apnea greater than or equal to 10 seconds: 0    2) Bradypnea - less than 8 breaths per minute: 0    3) Pain score on 0 to 10 scale: 0    4) Pain-sedation mismatch (yes or no): no    5) Repeated 02 desaturation less than 90% (yes or no): no    Anesthesia notified? (yes or no): no    Any of the above events occuring repeatedly in separate 30 minute intervals may be considered recurrent PACU respiratory events.    Patient stable and meets phase 1 discharge criteria for transport from PACU.    Report to STEVO Gregorio

## 2021-08-20 NOTE — INTERVAL H&P NOTE
Brief History of Illness:   Michelle Du is a 55 year old female who was admitted for left knee pain    H&P reviewed and patient examined with no new updates from prior exam

## 2021-08-20 NOTE — BRIEF OP NOTE
[facility]    Brief Operative Note    Pre-operative diagnosis: Chronic pain of both knees [M25.561, M25.562, G89.29]  Post-operative diagnosis Same as pre-operative diagnosis    Procedure: Procedure(s):  ARTHROPLASTY, KNEE, TOTAL  Surgeon: Surgeon(s) and Role:     * Mario Muhammad MD - Primary     * Bony Bradley PA-C - Assisting  Anesthesia: Spinal   Estimated blood loss: Less than 50 ml  Drains: None  Specimens: * No specimens in log *  Findings:   None.  Complications: None.  Implants:   Implant Name Type Inv. Item Serial No.  Lot No. LRB No. Used Action   BONE CEMENT SIMPLEX W/TOBRAMYCIN 6197-9-001 - [id number] Cement, Bone BONE CEMENT SIMPLEX W/TOBRAMYCIN 6197-9-001  [employer] [id number] Left 2 Implanted   IMP TIBIAL ZIM PSN NP STM 5DEG SZ DL -956-01 - [id number] Total Joint Component/Insert IMP TIBIAL ZIM PSN NP STM 5DEG SZ DL -843-01  [employer] [id number] Left 1 Implanted   IMP COMP FEM ZIM PSN PS CMT NARROW SZ 7 LT -928-01 - [id number] Total Joint Component/Insert IMP COMP FEM ZIM PSN PS CMT NARROW SZ 7 LT -890-01  [employer] [id number] Left 1 Implanted   IMP COMP PATELLA ZIM ALL POLY 32MM STD -960-32 - [id number] Total Joint Component/Insert IMP COMP PATELLA ZIM ALL POLY 32MM STD -373-32  [employer] [id number] Left 1 Implanted   articular surface fixed bearing Left 10mm height posterior stabilized    [employer] [id number] Left 1 Implanted

## 2021-08-20 NOTE — ANESTHESIA PROCEDURE NOTES
Adductor canal Procedure Note  Pre-Procedure   Staff -        CRNA: Kellerman, David, APRN CRNA       Performed By: CRNA       Location: pre-op       Procedure Start/Stop Times: 8/20/2021 1:15 PM and 8/20/2021 1:20 PM       Pre-Anesthestic Checklist: patient identified, IV checked, site marked, risks and benefits discussed, informed consent, monitors and equipment checked, pre-op evaluation, at physician/surgeon's request and post-op pain management  Timeout:       Correct Patient: Yes        Correct Procedure: Yes        Correct Site: Yes        Correct Position: Yes        Correct Laterality: Yes        Site Marked: Yes  Procedure Documentation  Procedure: Adductor canal       Diagnosis: RIGHT KNEE OA       Laterality: left       Patient Position: supine       Patient Prep/Sterile Barriers: sterile gloves, mask       Skin prep: Chloraprep       Needle Type: short bevel       Needle Gauge: 20.        Needle Length (Inches): 6        Needle Length (millimeters): 80        Ultrasound guided       1. Ultrasound was used to identify targeted nerve, plexus, vascular marker, or fascial plane and place a needle adjacent to it in real-time.       2. Ultrasound was used to visualize the spread of anesthetic in close proximity to the above referenced structure.       3. A permanent image is entered into the patient's record.       4. The visualized anatomic structures appeared normal.       5. There were no apparent abnormal pathologic findings.    Assessment/Narrative         The placement was negative for: blood aspirated, painful injection and site bleeding       Paresthesias: No.     Bolus given via needle..        Secured via.        Insertion/Infusion Method: Single Shot       Complications: none

## 2021-08-20 NOTE — ANESTHESIA PROCEDURE NOTES
Intrathecal injection Procedure Note  Pre-Procedure   Staff -        CRNA: Carina Saha APRN CRNA       Performed By: CRNA       Location: OR       Procedure Start/Stop Times: 8/20/2021 1:50 PM and 8/20/2021 2:05 PM       Pre-Anesthestic Checklist: patient identified, IV checked, risks and benefits discussed, informed consent, monitors and equipment checked, pre-op evaluation, at physician/surgeon's request and post-op pain management  Timeout:       Correct Patient: Yes        Correct Procedure: Yes        Correct Site: Yes        Correct Position: Yes   Procedure Documentation  Procedure: intrathecal injection       Diagnosis: Primary anesthesia       Patient Position: sitting       Patient Prep/Sterile Barriers: sterile gloves, mask       Skin prep: Chloraprep       Insertion Site: L3-4. (midline approach).       Needle Gauge: 25.        Needle Length (Inches): 5        Spinal Needle Type: Katarina tip       Introducer used       Introducer: 20 G       # of attempts: 3 and  # of redirects:  3    Assessment/Narrative         Paresthesias: No.       CSF fluid: clear.

## 2021-08-20 NOTE — PLAN OF CARE
Pt is POD 0 following total left knee. Pt numbness starting to subside. Was able to stand at edge of bed and use commode. Was incontinent of urine, voided 50 mL, and was then bladder scanned for 135 mL. BS active. Abdomen soft, non-tender. Ate 100% of dinner. Lung sounds clear. HRR stable. Post-op VSS. Left knee with ACE wrap on. Foot warm, cap refill WNL. IV infusing LR. Pain controlled at this time.   Alissa Helm RN on 8/20/2021 at 6:59 PM    Temp: 98.2  F (36.8  C) Temp src: Tympanic BP: 136/84 Pulse: 65   Resp: 16 SpO2: 99 % O2 Device: None (Room air) Oxygen Delivery: 2 LPM

## 2021-08-20 NOTE — ANESTHESIA CARE TRANSFER NOTE
Patient: Michelle Du    Procedure(s):  ARTHROPLASTY, KNEE, TOTAL    Diagnosis: Chronic pain of both knees [M25.561, M25.562, G89.29]  Diagnosis Additional Information: No value filed.    Anesthesia Type:   Spinal     Note:      Level of Consciousness: awake  Oxygen Supplementation: room air  Level of Supplemental Oxygen (L/min / FiO2): 97%  Independent Airway: airway patency satisfactory and stable  Dentition: dentition unchanged  Vital Signs Stable: post-procedure vital signs reviewed and stable  Report to RN Given: handoff report given  Patient transferred to: PACU    Handoff Report: Identifed the Patient, Identified the Reponsible Provider, Reviewed the pertinent medical history, Discussed the surgical course, Reviewed Intra-OP anesthesia mangement and issues during anesthesia, Set expectations for post-procedure period and Allowed opportunity for questions and acknowledgement of understanding      Vitals:  Vitals Value Taken Time   /69 08/20/21 1530   Temp 97.4  F (36.3  C) 08/20/21 1526   Pulse 64 08/20/21 1533   Resp 17 08/20/21 1533   SpO2 93 % 08/20/21 1533   Vitals shown include unvalidated device data.    Electronically Signed By: ROBERTA GROSS CRNA  August 20, 2021  3:35 PM

## 2021-08-20 NOTE — PROGRESS NOTES
[facility]    Hospitalist Progress Note      Assessment & Plan   Michelle Du is a 55 year old female who was admitted on 8/20/2021 after left total knee arthroplasty by Dr. Muhammad    Active Problems:    Left knee pain    Assessment: Longstanding requiring joint reconstruction. Left total knee arthroplasty today by Dr. Muhammad    Plan: Pain control, PT OT      SVETLANA (obstructive sleep apnea)    Assessment: Longstanding, controlled with CPAP    Plan: To use own CPAP. Forgot to bring it with her.  can bring it later to monitor for desaturations at night    Diet: Advance Diet as Tolerated: Regular Diet Adult    DVT Prophylaxis: Pneumatic Compression Devices  Taylor Catheter: Not present  Code Status: Full Code           Disposition Plan   Expected discharge: 2 days recommended to prior living arrangement once adequate pain management/ tolerating PO medications.  Entered: Efra Ryan MD 08/20/2021, 5:52 PM       The patient's care was discussed with the Patient.    Efra Ryan MD  Hospitalist Service  [facility]  Contact information available via [facility] Paging/Directory    ______________________________________________________________________    Interval History   Currently with good pain control. Had both epidural and knee block. No nausea    -Data reviewed today: I reviewed all new labs and imaging results over the last 24 hours. I personally reviewed the Left knee image(s) showing New left knee arthroplasty.    Physical Exam   Temp: 97.8  F (36.6  C) Temp src: Tympanic BP: 133/84 Pulse: 54   Resp: 16 SpO2: 100 % O2 Device: None (Room air) Oxygen Delivery: 2 LPM  Vitals:    08/20/21 1118   Weight: 117.9 kg (260 lb)     Vital Signs with Ranges  Temp:  [96.5  F (35.8  C)-99  F (37.2  C)] 97.8  F (36.6  C)  Pulse:  [54-73] 54  Resp:  [12-17] 16  BP: (100-147)/(56-84) 133/84  SpO2:  [93 %-100 %] 100 %  No intake/output data recorded.    Constitutional: A little  sleepy but otherwise alert and cooperative, no distress  Respiratory: Lungs are clear  Cardiovascular: RRR without murmur  GI: Abdomen obese, soft, nontender  Skin/Integumen: No open areas or rashes  Other: Left knee dressing clean dry and intact    Medications     lactated ringers 75 mL/hr at 08/20/21 1620       acetaminophen  975 mg Oral Q8H     [START ON 8/21/2021] aspirin  81 mg Oral BID     [START ON 8/21/2021] calcium carbonate-vitamin D  1 tablet Oral Daily     clindamycin  900 mg Intravenous Q8H     ketorolac  15 mg Intravenous Q6H     [START ON 8/21/2021] multivitamin w/minerals  1 tablet Oral Daily     [START ON 8/21/2021] polyethylene glycol  17 g Oral Daily     senna-docusate  1 tablet Oral BID     sodium chloride (PF)  3 mL Intracatheter Q8H       Data   No lab results found in last 7 days.    Recent Results (from the past 24 hour(s))   XR Knee Port Left 1/2 Views    Narrative    Exam: XR KNEE PORT LEFT 1/2 VIEWS    Technique: Left knee, 2 views    Comparison: The radiographs on 1/11/2018, 1/7/2021    Exam reason: Post-Op Total Knee    Findings:  Left total knee arthroplasty with prosthetic components in the  expected position. Alignment is anatomic. No periprosthetic fracture.    There is postoperative gas within the soft tissues about the left  knee. Skin staples are noted.      Impression    Impression:  Left total knee arthroplasty without identified complication.    JASON DELATORRE MD         SYSTEM ID:  [id number]

## 2021-08-20 NOTE — PROGRESS NOTES
Chart accessed pending admission to [facility].  Alissa Helm RN on 8/20/2021 at 3:08 PM

## 2021-08-20 NOTE — PROGRESS NOTES
Incentive Spirometry education completed.  Pt goal 1900 mls.  Pt achieved 2000 mls.  Pt instructed to perform 10/hr while awake with at least one deep breath and cough per hour until able to perform baseline activity.  RT will follow and re-assess as need.      Adeola Juarez, RT on 8/20/2021 at 5:51 PM

## 2021-08-20 NOTE — ANESTHESIA PREPROCEDURE EVALUATION
Anesthesia Pre-Procedure Evaluation    Patient: Michelle Du   MRN: [id number] : 1965        Preoperative Diagnosis: Chronic pain of both knees [M25.561, M25.562, G89.29]   Procedure : Procedure(s):  ARTHROPLASTY, KNEE, TOTAL     Past Medical History:   Diagnosis Date     Anemia 3/28/2013     Benign neoplasm of colon     2014,2013-repeat colonoscopy in 2018     Diverticulosis of large intestine without perforation or abscess without bleeding     3/29/2013     Gastritis without bleeding     3/29/2013     Gastro-esophageal reflux disease without esophagitis     No Comments Provided     History of colonic polyps     3/29/2013     Insomnia     No Comments Provided     Postmenopausal bleeding 2021    Added automatically from request for surgery [id number]     Urge incontinence     2011      Past Surgical History:   Procedure Laterality Date     ARTHROPLASTY KNEE Right 2021    Procedure: ARTHROPLASTY, KNEE, TOTAL;  Surgeon: Mario Muhammad MD;  Location:  OR     ARTHROSCOPY KNEE BILATERAL       carpel tunnel surgery - bilateral        SECTION      x 2     COLONOSCOPY  2013    3/29/13,Colonoscopy F/U      DILATION AND CURETTAGE, OPERATIVE HYSTEROSCOPY, COMBINED N/A 2021    Procedure: HYSTEROSCOPY, WITH DILATION AND CURETTAGE OF UTERUS;  Surgeon: Caridad Amaya MD;  Location:  OR     ESOPHAGOSCOPY, GASTROSCOPY, DUODENOSCOPY (EGD), COMBINED      3/29 13,EGD     LAPAROSCOPIC TUBAL LIGATION      No Comments Provided      Allergies   Allergen Reactions     Penicillins Rash     Sulfa Drugs Rash      Social History     Tobacco Use     Smoking status: Never Smoker     Smokeless tobacco: Never Used   Substance Use Topics     Alcohol use: No     Alcohol/week: 0.0 standard drinks      Wt Readings from Last 1 Encounters:   21 117.9 kg (260 lb)        Anesthesia Evaluation   Pt has had prior anesthetic.         ROS/MED HX  ENT/Pulmonary:     (+) SVETLANA risk factors,  snores loudly, hypertension, obese,     Neurologic:  - neg neurologic ROS     Cardiovascular:     (+) hypertension-----    METS/Exercise Tolerance: >4 METS    Hematologic:  - neg hematologic  ROS     Musculoskeletal: Comment: Left knee OA  (+) arthritis,     GI/Hepatic:     (+) GERD,     Renal/Genitourinary:  - neg Renal ROS     Endo: Comment: BMI >48    (+) Obesity,     Psychiatric/Substance Use:  - neg psychiatric ROS     Infectious Disease:  - neg infectious disease ROS     Malignancy:  - neg malignancy ROS     Other:  - neg other ROS          Physical Exam    Airway        Mallampati: II   TM distance: > 3 FB   Neck ROM: full   Mouth opening: > 3 cm    Respiratory Devices and Support         Dental  no notable dental history         Cardiovascular   cardiovascular exam normal       Rhythm and rate: regular and normal     Pulmonary   pulmonary exam normal        breath sounds clear to auscultation           OUTSIDE LABS:  CBC:   Lab Results   Component Value Date    WBC 6.1 01/27/2021    WBC 8.0 11/28/2018    HGB 13.6 08/04/2021    HGB 11.7 04/08/2021    HCT 41.4 01/27/2021    HCT 41.5 11/28/2018     01/27/2021     11/28/2018     BMP:   Lab Results   Component Value Date     08/04/2021     01/07/2021    POTASSIUM 4.0 08/04/2021    POTASSIUM 4.3 01/07/2021    CHLORIDE 104 08/04/2021    CHLORIDE 105 01/07/2021    CO2 28 08/04/2021    CO2 28 01/07/2021    BUN 25 08/04/2021    BUN 13 01/07/2021    CR 0.89 08/04/2021    CR 0.77 01/07/2021    GLC 91 08/04/2021    GLC 97 01/07/2021     COAGS:   Lab Results   Component Value Date    INR 1.2 03/25/2013     POC: No results found for: BGM, HCG, HCGS  HEPATIC:   Lab Results   Component Value Date    ALBUMIN 3.9 04/14/2017    PROTTOTAL 7.0 04/14/2017    ALKPHOS 77 04/14/2017    BILITOTAL 0.7 04/14/2017     OTHER:   Lab Results   Component Value Date    ELAN 9.9 08/04/2021       Anesthesia Plan    ASA Status:  3   NPO Status:  NPO Appropriate     Anesthesia Type: Spinal.              Consents    Anesthesia Plan(s) and associated risks, benefits, and realistic alternatives discussed. Questions answered and patient/representative(s) expressed understanding.     - Discussed with:  Patient         Postoperative Care    Pain management: Peripheral nerve block (Single Shot).        Comments:                David Kellerman, APRN CRNA

## 2021-08-21 ENCOUNTER — APPOINTMENT (OUTPATIENT)
Dept: OCCUPATIONAL THERAPY | Facility: OTHER | Age: 56
End: 2021-08-21
Attending: ORTHOPAEDIC SURGERY
Payer: COMMERCIAL

## 2021-08-21 ENCOUNTER — APPOINTMENT (OUTPATIENT)
Dept: PHYSICAL THERAPY | Facility: OTHER | Age: 56
End: 2021-08-21
Attending: ORTHOPAEDIC SURGERY
Payer: COMMERCIAL

## 2021-08-21 VITALS
SYSTOLIC BLOOD PRESSURE: 108 MMHG | HEART RATE: 58 BPM | DIASTOLIC BLOOD PRESSURE: 66 MMHG | HEIGHT: 62 IN | OXYGEN SATURATION: 94 % | BODY MASS INDEX: 47.84 KG/M2 | RESPIRATION RATE: 16 BRPM | WEIGHT: 260 LBS | TEMPERATURE: 98.7 F

## 2021-08-21 LAB — HGB BLD-MCNC: 12.2 G/DL (ref 11.7–15.7)

## 2021-08-21 PROCEDURE — 97110 THERAPEUTIC EXERCISES: CPT | Mod: GP

## 2021-08-21 PROCEDURE — 97530 THERAPEUTIC ACTIVITIES: CPT | Mod: GP

## 2021-08-21 PROCEDURE — 97116 GAIT TRAINING THERAPY: CPT | Mod: GP

## 2021-08-21 PROCEDURE — G0378 HOSPITAL OBSERVATION PER HR: HCPCS

## 2021-08-21 PROCEDURE — 36415 COLL VENOUS BLD VENIPUNCTURE: CPT | Performed by: ORTHOPAEDIC SURGERY

## 2021-08-21 PROCEDURE — 999N000157 HC STATISTIC RCP TIME EA 10 MIN

## 2021-08-21 PROCEDURE — 97165 OT EVAL LOW COMPLEX 30 MIN: CPT | Mod: GO | Performed by: OCCUPATIONAL THERAPIST

## 2021-08-21 PROCEDURE — 250N000011 HC RX IP 250 OP 636: Performed by: ORTHOPAEDIC SURGERY

## 2021-08-21 PROCEDURE — 96376 TX/PRO/DX INJ SAME DRUG ADON: CPT

## 2021-08-21 PROCEDURE — 97161 PT EVAL LOW COMPLEX 20 MIN: CPT | Mod: GP

## 2021-08-21 PROCEDURE — 85018 HEMOGLOBIN: CPT | Performed by: ORTHOPAEDIC SURGERY

## 2021-08-21 PROCEDURE — 250N000013 HC RX MED GY IP 250 OP 250 PS 637: Performed by: ORTHOPAEDIC SURGERY

## 2021-08-21 PROCEDURE — 99213 OFFICE O/P EST LOW 20 MIN: CPT | Performed by: FAMILY MEDICINE

## 2021-08-21 PROCEDURE — 97535 SELF CARE MNGMENT TRAINING: CPT | Mod: GO,XU | Performed by: OCCUPATIONAL THERAPIST

## 2021-08-21 RX ORDER — OXYCODONE HYDROCHLORIDE 5 MG/1
5-10 TABLET ORAL
Qty: 30 TABLET | Refills: 0 | Status: SHIPPED | OUTPATIENT
Start: 2021-08-21 | End: 2021-08-24

## 2021-08-21 RX ORDER — HYDROXYZINE HYDROCHLORIDE 25 MG/1
25 TABLET, FILM COATED ORAL EVERY 6 HOURS PRN
Qty: 30 TABLET | Refills: 0 | Status: SHIPPED | OUTPATIENT
Start: 2021-08-21 | End: 2022-01-24

## 2021-08-21 RX ORDER — ASPIRIN 81 MG/1
81 TABLET ORAL 2 TIMES DAILY
Qty: 60 TABLET | Refills: 0 | Status: SHIPPED | OUTPATIENT
Start: 2021-08-21 | End: 2022-11-11

## 2021-08-21 RX ORDER — ACETAMINOPHEN 325 MG/1
650 TABLET ORAL EVERY 4 HOURS PRN
Qty: 100 TABLET | Refills: 0 | Status: SHIPPED | OUTPATIENT
Start: 2021-08-21 | End: 2022-01-24

## 2021-08-21 RX ORDER — AMOXICILLIN 250 MG
1-2 CAPSULE ORAL 2 TIMES DAILY
Qty: 30 TABLET | Refills: 0 | Status: SHIPPED | OUTPATIENT
Start: 2021-08-21 | End: 2022-01-24

## 2021-08-21 RX ADMIN — KETOROLAC TROMETHAMINE 15 MG: 15 INJECTION, SOLUTION INTRAMUSCULAR; INTRAVENOUS at 11:50

## 2021-08-21 RX ADMIN — KETOROLAC TROMETHAMINE 15 MG: 15 INJECTION, SOLUTION INTRAMUSCULAR; INTRAVENOUS at 05:46

## 2021-08-21 RX ADMIN — DOCUSATE SODIUM 50 MG AND SENNOSIDES 8.6 MG 1 TABLET: 8.6; 5 TABLET, FILM COATED ORAL at 10:31

## 2021-08-21 RX ADMIN — ACETAMINOPHEN 975 MG: 325 TABLET, FILM COATED ORAL at 05:46

## 2021-08-21 RX ADMIN — OXYCODONE HYDROCHLORIDE 10 MG: 5 TABLET ORAL at 08:30

## 2021-08-21 RX ADMIN — ACETAMINOPHEN 975 MG: 325 TABLET, FILM COATED ORAL at 13:46

## 2021-08-21 RX ADMIN — Medication 1 TABLET: at 10:31

## 2021-08-21 RX ADMIN — OXYCODONE HYDROCHLORIDE 10 MG: 5 TABLET ORAL at 03:12

## 2021-08-21 RX ADMIN — ASPIRIN 81 MG: 81 TABLET, DELAYED RELEASE ORAL at 10:31

## 2021-08-21 RX ADMIN — CLINDAMYCIN PHOSPHATE 900 MG: 900 INJECTION, SOLUTION INTRAVENOUS at 05:46

## 2021-08-21 ASSESSMENT — ACTIVITIES OF DAILY LIVING (ADL)
ADLS_ACUITY_SCORE: 25

## 2021-08-21 NOTE — PHARMACY - DISCHARGE MEDICATION RECONCILIATION
"Pharmacy:  Discharge Counseling and Medication Reconciliation    Michelle Du  [address] [facility] [address]  [phone] (home)   55 year old female  PCP: Cristal Naranjo    Allergies: Penicillins and Sulfa drugs    Discharge Counseling:    Pharmacist met with patient (and/or family) today to review the medication portion of the After Visit Summary (with an emphasis on NEW medications) and to address patient's questions/concerns.    Summary of Education: Met with patient and reviewed indication, expected duration, potential side effects, and answered questions.      Materials Provided:  MedCounselor sheets printed from Clinical Pharmacology on: \"Acetaminophen\", \"Hydroxyzine\" and \"Oxycodone\"    Discharge Medication Reconciliation:    It has been determined that the patient has an adequate supply of medications available or which can be obtained from the patient's preferred pharmacy.  Thank you for the consult.    Joel Vega RPH........August 21, 2021 1:43 PM        "

## 2021-08-21 NOTE — PROGRESS NOTES
NSG DISCHARGE NOTE    Patient discharged to home at 2:00 PM via wheel chair. Accompanied by spouse and staff. Discharge instructions reviewed with patient, opportunity offered to ask questions. Prescriptions sent to patients preferred pharmacy. All belongings sent with patient.    Elizabeth Walker RN

## 2021-08-21 NOTE — PLAN OF CARE
Physical Therapy Discharge Summary    Reason for therapy discharge:    Discharged to home with outpatient therapy.    Progress towards therapy goal(s). See goals on Care Plan in [facility] electronic health record for goal details.  Patient demonstrating functional mobility.    Therapy recommendation(s):    Continued therapy is recommended.  Rationale/Recommendations:  to promote strength, stability, safe mobility and return of left knee function.

## 2021-08-21 NOTE — PROGRESS NOTES
Shift summary  IS started and pt currently on RA with no respiratory issues as this time.  Adeola Juarez, RT on 8/20/2021 at 7:30 PM

## 2021-08-21 NOTE — DISCHARGE SUMMARY
[facility]    Discharge Summary  Hospitalist    Date of Admission:  8/20/2021  Date of Discharge:  8/21/2021  Discharging Provider: Efra Ryan  Date of Service (when I saw the patient): 08/21/21    Discharge Diagnoses   Principal Problem:    Left knee pain (2/4/2021)  Active Problems:    SVETLANA (obstructive sleep apnea) (8/20/2021)      History of Present Illness   Michelle Du is an 55 year old female who presented for left total knee arthroplasty.     Hospital Course   Michelle Du was admitted on 8/20/2021.  She underwent left total knee arthroplasty by Dr. Muhammad.  She had an uneventful postoperative course.  She was ambulating with a walker and was evaluated by physical therapy and felt to be ready to be discharged.  She was tolerating regular diet and had good pain control with oral pain medications.  She will be on 81 mg of aspirin twice a day for DVT prophylaxis.    She will follow up with Dr. Muhammad on September 1 at 12:45 PM for hospital follow-up.    Efra Ryan MD    Significant Results and Procedures       Pending Results     Unresulted Labs Ordered in the Past 30 Days of this Admission     No orders found for last 31 day(s).          Code Status   Full Code       Primary Care Physician   Cristal Naranjo    Physical Exam   Temp: 98.7  F (37.1  C) Temp src: Tympanic BP: 108/66 Pulse: 58   Resp: 16 SpO2: 94 % O2 Device: None (Room air)    Vitals:    08/20/21 1118   Weight: 117.9 kg (260 lb)     Vital Signs with Ranges  Temp:  [96.5  F (35.8  C)-99.1  F (37.3  C)] 98.7  F (37.1  C)  Pulse:  [54-72] 58  Resp:  [12-17] 16  BP: (100-136)/(60-84) 108/66  SpO2:  [90 %-100 %] 94 %  I/O last 3 completed shifts:  In: 1993 [P.O.:1020; I.V.:973]  Out: 950 [Urine:950]    Constitutional: Alert and cooperative, no distress  Eyes: PERRLA, EOMI  ENT: Mucous membranes are moist  Respiratory: Lungs are clear  Cardiovascular: RRR without murmur  GI: Obese, soft, nontender  Musculoskeletal: Left  "knee dressing clean, dry, intact.  Mild swelling noted but no calf tenderness.    Discharge Disposition   Discharged to home  Condition at discharge: Stable    Consultations This Hospital Stay   SOCIAL WORK IP CONSULT  HOSPITALIST IP CONSULT  PHYSICAL THERAPY ADULT IP CONSULT  OCCUPATIONAL THERAPY ADULT IP CONSULT    Time Spent on this Encounter   IEfra MD, personally saw the patient today and spent greater than 30 minutes discharging this patient.    Discharge Orders      Reason for your hospital stay    Left total knee replacement     When to call - Contact Surgeon Team    You may experience symptoms that require follow-up before your scheduled appointment. Refer to the \"[employer]\" for instructions on when to contact your Surgeon Team if you are concerned about pain control, blood clots, constipation, or if you are unable to urinate.     When to call - Reach out to Urgent Care    If you are not able to reach your Surgeon Team and you need immediate care, go to the Orthopedic Walk-in Clinic or Urgent Care at your Surgeon's office.  Do NOT go to the Emergency Room unless you have shortness of breath, chest pain, or other signs of a medical emergency.     When to call - Reasons to Call 911    Call 911 immediately if you experience sudden-onset chest pain, arm weakness/numbness, slurred speech, or shortness of breath     Discharge Instruction - Breathing exercises    Perform breathing exercises using your Incentive Spirometer 10 times per hour while awake for 2 weeks.     Symptoms - Fever Management    A low grade fever can be expected after surgery.  Use acetaminophen (TYLENOL) as needed for fever management.  Contact your Surgeon Team if you have a fever greater than 101.5 F, chills, and/or night sweats.     Symptoms - Constipation management    Constipation (hard, dry bowel movements) is expected after surgery due to the combination of being less active, the anesthetic, and the opioid pain " medication.  You can do the following to help reduce constipation:  ~  FLUIDS:  Drink clear liquids (water or Gatorade), or juice (apple/prune).  ~  DIET:  Eat a fiber rich diet.    ~  ACTIVITY:  Get up and move around several times a day.  Increase your activity as you are able.  MEDICATIONS:  Reduce the risk of constipation by starting medications before you are constipated.  You can take Miralax   (1 packet as directed) and/or a stool softener (Senokot 1-2 tablets 1-2 times a day).  If you already have constipation and these medications are not working, you can get magnesium citrate and use as directed.  If you continue to have constipation you can try an over the counter suppository or enema.  Call your Surgeon Team if it has been greater than 3 days since your last bowel movement.     Symptoms - Reduced Urine Output    Changes in the amount of fluids you drank before and after surgery may result in problems urinating.  It is important to stay well-hydrated after surgery and drink plenty of water. If it has been greater than 8 hours since you have urinated despite drinking plenty of water, call your Surgeon Team.     Activity - Exercises to prevent blood clots    Unless otherwise directed by your Surgeon team, perform the following exercises at least three times per day for the first four weeks after surgery to prevent blood clots in your legs: 1) Point and flex your feet (Ankle Pumps), 2) Move your ankle around in big circles, 3) Wiggle your toes, 4) Walk, even for short distances, several times a day, will help decrease the risk of blood clots.     Comfort and Pain Management - Pain after Surgery    Pain after surgery is normal and expected.  You will have some amount of pain for several weeks after surgery.  Your pain will improve with time.  There are several things you can do to help reduce your pain including: rest, ice, elevation, and using pain medications as needed. Contact your Surgeon Team if you have  pain that persists or worsens after surgery despite rest, ice, elevation, and taking your medication(s) as prescribed. Contact your Surgeon Team if you have new numbness, tingling, or weakness in your operative extremity.     Comfort and Pain Management - Swelling after Surgery    Swelling and/or bruising of the surgical extremity is common and may persist for several months after surgery. In addition to frequent icing and elevation, gentle compressive support with an ACE wrap or tubigrip may help with swelling. Apply compression regularly, removing at least twice daily to perform skin checks. Contact your Surgeon Team if your swelling increases and is NOT associated with an increase in your activity level, or if your swelling increases and is associated with redness and pain.     Comfort and Pain Management - Cold therapy    Ice can be used to control swelling and discomfort after surgery. Place a thin towel over your operative site and apply the ice pack overtop. Leave ice pack in place for 20 minutes, then remove for 20 minutes. Repeat this 20 minutes on/20 minutes off routine as often as tolerated.     Medication Instructions - Acetaminophen (TYLENOL) Instructions    You were discharged with acetaminophen (TYLENOL) for pain management after surgery. Acetaminophen most effectively manages pain symptoms when it is taken on a schedule without missing doses (every four, six, or eight hours). Your Provider will prescribe a safe daily dose between 3000 - 4000 mg.  Do NOT exceed this daily dose. Most patients use acetaminophen for pain control for the first four weeks after surgery.  You can wean from this medication as your pain decreases.     Medication Instructions - NSAID Instructions    You were discharged with an anti-inflammatory medication for pain management to use in combination with acetaminophen (TYLENOL) and the narcotic pain medication.  Take this medication exactly as directed.  You should only take one  "anti-inflammatory at a time.  Some common anti-inflammatories include: ibuprofen (ADVIL, MOTRIN), naproxen (ALEVE, NAPROSYN), celecoxib (CELEBREX), meloxicam (MOBIC), ketorolac (TORADOL).  Take this medication with food and water.     Medication Instructions - Opioids - Tapering Instructions    In the first three days following surgery, your symptoms may warrant use of the narcotic pain medication every four to six hours as prescribed. This is normal. As your pain symptoms improve, focus your efforts on decreasing (tapering) use of narcotic medications. The most successful tapering strategy is to first, decrease the number of tablets you take every 4-6 hours to the minimum prescribed. Then, increase the amount of time between doses.  For example:  First, taper to   or 1 tablet every 4-6 hours.  Then, taper to   or 1 tablet every 6-8 hours.  Then, taper to   or 1 tablet every 8-10 hours.  Then, taper to   or 1 tablet every 10-12 hours.  Then, taper to   or 1 tablet at bedtime.  The bedtime dose can help with comfort during sleep and is typically the last dose to be discontinued after surgery.     Follow Up Care    Follow-up with your Surgeon Team in 2 weeks for wound check.     Medication instructions -  Anticoagulation - aspirin    Take the aspirin as prescribed for a total of four weeks after surgery.  This is given to help minimize your risk of blood clot.     Comfort and Pain Management - LOWER Extremity Elevation    Swelling is expected for several months after surgery. This type of swelling is usually associated with gravity and activity, and can be improved with elevation.   The best way to do this is to get your \"toes above your nose\" by laying down and placing several pillows lengthwise under your calf and heel. When elevating your leg keep your knee completely straight. Perform this elevation as often as possible especially for the first two weeks after surgery.     Medication Instructions - Opioid " Instructions (Less than 65 years)    You were discharged with an opioid medication (hydromorphone, oxycodone, hydrocodone, or tramadol). This medication should only be taken for breakthrough pain that is not controlled with acetaminophen (TYLENOL). If you rate your pain less than 3 you do not need this medication.  Pain rating 0-3:  You do not need this medication.  Pain rating 4-6:  Take 1 tablet every 4-6 hours as needed  Pain rating 7-10:  Take 2 tablets every 4-6 hours as needed.  Do not exceed 6 tablets per day     Activity - Total Knee Arthroplasty     Return to Driving    Return to driving - Driving is NOT permitted until directed by your provider. Under no circumstance are you permitted to drive while using narcotic pain medications.     Dressing / Wound Care - Wound    You have a clean dressing on your surgical wound. Dressing change instructions as follows: dressing will be removed at your follow-up appointment. Contact your Surgeon Team if you have increased redness, warmth around the surgical wound, and/or drainage from the surgical wound.     Dressing / Wound Care - NO Tub Bathing    Tub bathing, swimming, or any other activities that will cause your incision to be submerged in water should be avoided until allowed by your Surgeon.     NO Precautions    No precautions directed by your Provider.  You may perform range of motion activities as tolerated.     Weight bearing as tolerated    Weight bearing as tolerated on your operative extremity.     Dressing / Wound care - Shower with wound/dressing covered    You must COVER your dressing or incision with saran wrap (or any other non-permeable covering) to allow the incision to remain dry while showering.  You may shower 3 days after surgery as long as the surgical wound stays dry. Continue to cover your dressing or incision for showering until your first office visit.  You are strictly prohibited from soaking   or submerging the surgical wound underwater.      Reason for your hospital stay    Left total knee arthroplasty     Follow-up and recommended labs and tests     Follow up with Dr. Muhammad , at (location with clinic name or city) [facility] on 9/1/21 at 12:45   for hospital follow- up. No follow up labs or test are needed.     Activity    Your activity upon discharge: as directed by PT     Full Code     Discharge Instruction - Regular Diet Adult    Return to your pre-surgery diet unless instructed otherwise     Diet    Follow this diet upon discharge: Orders Placed This Encounter      Advance Diet as Tolerated: Regular Diet Adult     Discharge Medications   Current Discharge Medication List      START taking these medications    Details   acetaminophen (TYLENOL) 325 MG tablet Take 2 tablets (650 mg) by mouth every 4 hours as needed for other (mild pain)  Qty: 100 tablet, Refills: 0    Associated Diagnoses: Chronic pain of both knees      hydrOXYzine (ATARAX) 25 MG tablet Take 1 tablet (25 mg) by mouth every 6 hours as needed for itching or anxiety (with pain, moderate pain)  Qty: 30 tablet, Refills: 0    Associated Diagnoses: Chronic pain of both knees      oxyCODONE (ROXICODONE) 5 MG tablet Take 1-2 tablets (5-10 mg) by mouth every 3 hours as needed for pain (Moderate to Severe)  Qty: 30 tablet, Refills: 0    Associated Diagnoses: Chronic pain of both knees      !! senna-docusate (SENOKOT-S/PERICOLACE) 8.6-50 MG tablet Take 1-2 tablets by mouth 2 times daily Take while on oral narcotics to prevent or treat constipation.  Qty: 30 tablet, Refills: 0    Comments: While taking narcotics  Associated Diagnoses: Chronic pain of both knees       !! - Potential duplicate medications found. Please discuss with provider.      CONTINUE these medications which have CHANGED    Details   aspirin 81 MG EC tablet Take 1 tablet (81 mg) by mouth 2 times daily  Qty: 60 tablet, Refills: 0    Associated Diagnoses: Chronic pain of both knees         CONTINUE  these medications which have NOT CHANGED    Details   calcium carbonate-vitamin D (OSCAL W/D) 500-200 MG-UNIT tablet Take 1 tablet by mouth daily   Qty:        Multiple Vitamins-Minerals (MULTIVITAMIN ADULT EXTRA C) CHEW Take 2 chew tab by mouth daily - gummy formulation      POTASSIUM PO Take 1 tablet by mouth daily       !! senna-docusate (SENOKOT-S/PERICOLACE) 8.6-50 MG tablet Take 1-2 tablets by mouth 2 times daily Take while on oral narcotics to prevent or treat constipation.  Qty: 30 tablet, Refills: 0    Comments: While taking narcotics  Associated Diagnoses: Chronic pain of both knees       !! - Potential duplicate medications found. Please discuss with provider.        Allergies   Allergies   Allergen Reactions     Penicillins Rash     Sulfa Drugs Rash     Data   Most Recent 3 CBC's:  Recent Labs   Lab Test 08/21/21  0546 08/04/21  1600 04/08/21  0530 01/27/21  0835 11/28/18  1327 08/02/18  1357   WBC  --   --   --  6.1 8.0 6.6   HGB 12.2 13.6 11.7 13.2 13.5 13.5   MCV  --   --   --  89 89 89   PLT  --   --   --  250 258 267      Most Recent 3 BMP's:  Recent Labs   Lab Test 08/04/21  1600 01/07/21  0909 11/28/18  1327    140 141   POTASSIUM 4.0 4.3 4.4   CHLORIDE 104 105 104   CO2 28 28 29   BUN 25 13 22   CR 0.89 0.77 0.65   ANIONGAP 9 7 8   ELAN 9.9 9.6 9.3   GLC 91 97 88     Most Recent 2 LFT's:  Recent Labs   Lab Test 04/14/17  0829   ALKPHOS 77   BILITOTAL 0.7     Most Recent INR's and Anticoagulation Dosing History:  Anticoagulation Dose History     Recent Dosing and Labs Latest Ref Rng & Units 3/25/2013    INR <1.3 INR 1.2        Most Recent 3 Troponin's:No lab results found.  Most Recent Cholesterol Panel:  Recent Labs   Lab Test 01/07/21  0909   CHOL 260*   *   HDL 59   TRIG 129     Most Recent 6 Bacteria Isolates From Any Culture (See EPIC Reports for Culture Details):  Recent Labs   Lab Test 01/20/21  1709   CULT 10,000 to 50,000 colonies/mL  mixed urogenital bryan  No further  identification or sensitivity done       Most Recent TSH, T4 and A1c Labs:No lab results found.  Results for orders placed or performed during the hospital encounter of 08/20/21   XR Knee Port Left 1/2 Views    Narrative    Exam: XR KNEE PORT LEFT 1/2 VIEWS    Technique: Left knee, 2 views    Comparison: The radiographs on 1/11/2018, 1/7/2021    Exam reason: Post-Op Total Knee    Findings:  Left total knee arthroplasty with prosthetic components in the  expected position. Alignment is anatomic. No periprosthetic fracture.    There is postoperative gas within the soft tissues about the left  knee. Skin staples are noted.      Impression    Impression:  Left total knee arthroplasty without identified complication.    JASON DELATORRE MD         SYSTEM ID:  [id number]

## 2021-08-21 NOTE — PROGRESS NOTES
08/21/21 0800   Quick Adds   Type of Visit Initial PT Evaluation   Living Environment   People in home spouse   Current Living Arrangements house   Home Accessibility no concerns;stairs to enter home   Number of Stairs, Main Entrance 3   Self-Care   Usual Activity Tolerance excellent   Current Activity Tolerance moderate   Equipment Currently Used at Home walker, rolling;cane, straight;crutches   Disability/Function   Hearing Difficulty or Deaf no   Wear Glasses or Blind yes   Vision Management glasses   Concentrating, Remembering or Making Decisions Difficulty no   Difficulty Communicating no   Difficulty Eating/Swallowing no   Walking or Climbing Stairs Difficulty no   Dressing/Bathing Difficulty no   Toileting issues no   Doing Errands Independently Difficulty (such as shopping) no   Fall history within last six months no   General Information   Referring Physician Sabina   Patient/Family Therapy Goals Statement (PT) return home   Existing Precautions/Restrictions fall  (s/p left TKA)   Weight-Bearing Status - LLE weight-bearing as tolerated   Weight-Bearing Status - RLE full weight-bearing   Cognition   Orientation Status (Cognition) oriented x 4   Affect/Mental Status (Cognition) WFL   Follows Commands (Cognition) WFL   Pain Assessment   Patient Currently in Pain Yes, see Vital Sign flowsheet   Integumentary/Edema   Integumentary/Edema Comments surgical incision   Posture    Posture Not impaired   Range of Motion (ROM)   ROM Quick Adds ROM WFL   ROM Comment left   Strength   Manual Muscle Testing Quick Adds Strength WFL   Strength Comments with exception of left knee not tested post-op   Bed Mobility   Comment (Bed Mobility) minimal assist for left LE support   Transfers   Transfer Safety Comments SBA with Fww   Gait/Stairs (Locomotion)   [place] Level (Gait) supervision   Assistive Device (Gait) walker, front-wheeled   Distance in Feet (Required for LE Total Joints) 200   Pattern (Gait) 2-point    Balance   Balance Comments good wih Fww   Sensory Examination   Sensory Perception WFL   Coordination   Coordination no deficits were identified   Muscle Tone   Muscle Tone no deficits were identified   Clinical Impression   Criteria for Skilled Therapeutic Intervention yes, treatment indicated   PT Diagnosis (PT) impaired mobility   Influenced by the following impairments pain,fatigue and weakness   Functional limitations due to impairments activity/gait tolerance   Clinical Presentation Stable/Uncomplicated   Clinical Decision Making (Complexity) low complexity   Therapy Frequency (PT) Daily   Predicted Duration of Therapy Intervention (days/wks) 1-2 days   Planned Therapy Interventions (PT) bed mobility training;gait training;ROM (range of motion);transfer training;strengthening   Anticipated Equipment Needs at Discharge (PT) walker, rolling   Risk & Benefits of therapy have been explained risks/benefits reviewed;patient   PT Discharge Planning    PT Discharge Recommendation (DC Rec) home with assist;home with outpatient physical therapy   PT Rationale for DC Rec to promote strength, stability, safe mobility and return of left knee function   PT Brief overview of current status  minimal assist with bed mobilities for left LE support; SBA for transfers and ambulation using a Fww   Total Evaluation Time   Total Evaluation Time (Minutes) 15

## 2021-08-21 NOTE — PLAN OF CARE
Occupational Therapy Discharge Summary    Reason for therapy discharge:    Discharged to home.    Progress towards therapy goal(s). See goals on Care Plan in [facility] electronic health record for goal details.  Goals met    Therapy recommendation(s):    No further therapy is recommended.pt will have assist from  as needed and outpatient PT.

## 2021-08-21 NOTE — PLAN OF CARE
Patient admitted POD #1 following L TKA. VSS and WNL. Ace wrap WNL to L knee, UTV incision. Pain to L knee managed with PRN Oxycodone, Dilaudid and scheduled meds. Polar care to L knee. Patient numbness to BLLE improved. Patient able to stand and pivot to commode but has limited activity due to increased pain. Adequate urine output. Active bowel sounds. Denies n/v. Tolerating regular diet. Oral fluid intake encouraged. Will continue to monitor. Temp: 97.5  F (36.4  C) Temp src: Oral BP: 120/71 Pulse: 61   Resp: 16 SpO2: 95 % O2 Device: None (Room air) Oxygen Delivery: 2 LPM    Brooke Gallo RN on 8/21/2021 at 6:30 AM

## 2021-08-21 NOTE — PROGRESS NOTES
08/21/21 1200   Quick Adds   Type of Visit Initial Occupational Therapy Evaluation   Living Environment   People in home spouse   Current Living Arrangements house   Home Accessibility stairs to enter home   Self-Care   Usual Activity Tolerance good   Current Activity Tolerance moderate   Equipment Currently Used at Home walker, rolling;shower chair   Disability/Function   Hearing Difficulty or Deaf no   Wear Glasses or Blind no   Cognitive Status Examination   Orientation Status orientation to person, place and time   Affect/Mental Status (Cognitive) WFL   Pain Assessment   Patient Currently in Pain Yes, see Vital Sign flowsheet   Range of Motion Comprehensive   General Range of Motion no range of motion deficits identified   Coordination   Upper Extremity Coordination No deficits were identified   Transfers   Transfers toilet transfer;shower transfer   Shower Transfer   [place] Level (Shower Transfer) supervision   Assistive Device (Shower Transfer) walker, front-wheeled   Toilet Transfer   [place] Level (Toilet Transfer) supervision   Assistive Device (Toilet Transfer) grab bars/safety frame   Clinical Impression   Criteria for Skilled Therapeutic Interventions Met (OT) no   OT Diagnosis left TKA    OT Problem List-Impairments impacting ADL activity tolerance impaired;pain;post-surgical precautions   Assessment of Occupational Performance 1-3 Performance Deficits   Identified Performance Deficits limited ADL's    Planned Therapy Interventions (OT)   (eval only )   Clinical Decision Making Complexity (OT) low complexity   Therapy Frequency (OT) 1x eval and treat   Predicted Duration of Therapy eval only    Risk & Benefits of therapy have been explained risks/benefits reviewed   OT Discharge Planning    OT Discharge Recommendation (DC Rec) Home with assist   OT Rationale for DC Rec pt will likely have no OT needs upon D/C    OT Brief overview of current status  Pt has progressed very well, ambulating  in hallway and room with SBA, took a full shower with distant supervision, needed min assist with L/B dressing but will have assist from  at home as needed.    Total Evaluation Time (Minutes)   Total Evaluation Time (Minutes) 15

## 2021-08-21 NOTE — PLAN OF CARE
Patient is alert and oriented. Left knee pain controlled; rating 5/10. Cold therapy maintained. PRN oxycodone administered per MAR. Tolerated PT/OT. Patient ambulated in room and to bathroom. Voiding without difficulty. Dressing over incision is clean, dry, and intact. Dime size amount of drainage on lower portion of aquacel. Some swelling and bruising noted around surgical dressing. CMS intact. LS clear. VSS.    Temp: 98.7  F (37.1  C) Temp src: Tympanic BP: 108/66 Pulse: 58   Resp: 16 SpO2: 94 % O2 Device: None (Room air)

## 2021-08-23 ENCOUNTER — PATIENT OUTREACH (OUTPATIENT)
Dept: CARE COORDINATION | Facility: CLINIC | Age: 56
End: 2021-08-23

## 2021-08-23 NOTE — PROGRESS NOTES
Transitional Care Management Phone Call  Surgical. Patient discharged with orthopedic surgical follow-up only. No TCM call required per policy.   Christina Morton RN on 8/23/2021 at 10:32 AM

## 2021-08-24 ENCOUNTER — TRANSFERRED RECORDS (OUTPATIENT)
Dept: HEALTH INFORMATION MANAGEMENT | Facility: OTHER | Age: 56
End: 2021-08-24

## 2021-09-01 ENCOUNTER — OFFICE VISIT (OUTPATIENT)
Dept: ORTHOPEDICS | Facility: OTHER | Age: 56
End: 2021-09-01
Attending: ORTHOPAEDIC SURGERY
Payer: COMMERCIAL

## 2021-09-01 DIAGNOSIS — Z96.652 STATUS POST TOTAL LEFT KNEE REPLACEMENT: Primary | ICD-10-CM

## 2021-09-01 PROCEDURE — 99024 POSTOP FOLLOW-UP VISIT: CPT | Performed by: ORTHOPAEDIC SURGERY

## 2021-09-01 NOTE — PROGRESS NOTES
Visit Date: 2021    REASON FOR EVALUATION:  Left knee replacement.    HISTORY OF PRESENT ILLNESS:  Michelle comes back, left knee replacement.  Overall, reports doing reasonably well.  Working through therapies at this time.  She is set up for that.  She is going to [facility] for that at this point.    PHYSICAL EXAMINATION:  Examination of the knee shows motion 0 to about 190.  Ligament exam is stable and balanced.  Incision sites healing appropriately well.  No signs or symptoms of infection otherwise present.    IMPRESSION:  Left total knee replacement.    PLAN:  Continue to work through the recovery process.  I will see patient back otherwise in about a month.  X-rays 3 views of the knee at that time.    Mario Muhammad MD        D: 2021   T: 2021   MT: md    Name:     MICHELLE GALVAN  MRN:      [id number]        Account:    [id number]   :      1965           Visit Date: 2021     Document: [id number]

## 2021-09-07 ENCOUNTER — TELEPHONE (OUTPATIENT)
Dept: ORTHOPEDICS | Facility: OTHER | Age: 56
End: 2021-09-07

## 2021-09-07 ENCOUNTER — OFFICE VISIT (OUTPATIENT)
Dept: ORTHOPEDICS | Facility: OTHER | Age: 56
End: 2021-09-07
Attending: ORTHOPAEDIC SURGERY
Payer: COMMERCIAL

## 2021-09-07 DIAGNOSIS — Z96.652 STATUS POST TOTAL LEFT KNEE REPLACEMENT: Primary | ICD-10-CM

## 2021-09-07 NOTE — TELEPHONE ENCOUNTER
Patient left a message on [address] voicemail stating that she had her staples removed on Sept 1 but there is one still in. Please advise.    Lissa Luke on 9/7/2021 at 7:43 AM

## 2021-09-07 NOTE — PROGRESS NOTES
Patient presents to clinic today with 1 remaining staple in place she happened to see yesterday. Had staples removed last week with Dr. Muhammad for her left total knee. The 1 remaining staple was removed without difficulty. Steri strip applied. Patient will follow up as scheduled.   Shikha Cuellar LPN .....................9/7/2021 9:48 AM

## 2021-09-27 DIAGNOSIS — Z96.652 STATUS POST TOTAL LEFT KNEE REPLACEMENT: Primary | ICD-10-CM

## 2021-09-28 ENCOUNTER — HOSPITAL ENCOUNTER (OUTPATIENT)
Dept: GENERAL RADIOLOGY | Facility: OTHER | Age: 56
End: 2021-09-28
Attending: ORTHOPAEDIC SURGERY
Payer: COMMERCIAL

## 2021-09-28 ENCOUNTER — OFFICE VISIT (OUTPATIENT)
Dept: ORTHOPEDICS | Facility: OTHER | Age: 56
End: 2021-09-28
Attending: ORTHOPAEDIC SURGERY
Payer: COMMERCIAL

## 2021-09-28 DIAGNOSIS — Z96.652 STATUS POST TOTAL LEFT KNEE REPLACEMENT: ICD-10-CM

## 2021-09-28 DIAGNOSIS — Z96.652 STATUS POST TOTAL LEFT KNEE REPLACEMENT: Primary | ICD-10-CM

## 2021-09-28 PROCEDURE — 99024 POSTOP FOLLOW-UP VISIT: CPT | Performed by: ORTHOPAEDIC SURGERY

## 2021-09-28 PROCEDURE — 73562 X-RAY EXAM OF KNEE 3: CPT | Mod: LT

## 2021-09-28 NOTE — PROGRESS NOTES
Visit Date: 2021    REASON FOR EVALUATION:  Left knee replacement.    HISTORY:  Michelle comes in, left knee replacement.  Overall, she reports doing fairly well.  Did have a fall here that set her back a little bit, but short of that, she is feeling like she is recovering at this point in time.    PHYSICAL EXAMINATION:  Examination of the knee shows range of motion about -5 to about 110.  Ligament exam stable and balanced.  Intact extensor mechanism.  Incision sites well healed.    IMAGING:  X-rays were reviewed, 2 views of the knee.  The patient's components are in stable alignment and position at this current time.  No evidence of any fractures identified at this time.    IMPRESSION:  Left total knee replacement.    PLAN:  Continue to work through the recovery process here.  She is going to resume working status here in November.  I will see her back in a month.  Simple rehab check at that time.    Mario Muhammad MD        D: 2021   T: 2021   MT: CHRISTEN    Name:     MICHELLE GALVAN  MRN:      [id number]        Account:    [id number]   :      1965           Visit Date: 2021     Document: [id number]

## 2021-09-28 NOTE — PROGRESS NOTES
Patient is here for follow up on her left total knee.   Shikha Cuellar LPN .....................9/28/2021 11:44 AM

## 2021-10-12 ENCOUNTER — TRANSFERRED RECORDS (OUTPATIENT)
Dept: HEALTH INFORMATION MANAGEMENT | Facility: OTHER | Age: 56
End: 2021-10-12

## 2021-10-26 ENCOUNTER — OFFICE VISIT (OUTPATIENT)
Dept: ORTHOPEDICS | Facility: OTHER | Age: 56
End: 2021-10-26
Attending: ORTHOPAEDIC SURGERY
Payer: COMMERCIAL

## 2021-10-26 DIAGNOSIS — Z96.652 STATUS POST TOTAL LEFT KNEE REPLACEMENT: Primary | ICD-10-CM

## 2021-10-26 PROCEDURE — 99024 POSTOP FOLLOW-UP VISIT: CPT | Performed by: ORTHOPAEDIC SURGERY

## 2021-10-26 NOTE — PROGRESS NOTES
Chief Complaint   Patient presents with     Surgical Followup     s/p left TKA       Karime Sullivan LPN

## 2021-10-26 NOTE — PROGRESS NOTES
Visit Date: 10/26/2021    REASON FOR EVALUATION:  Left knee replacement.    HISTORY OF PRESENT ILLNESS:  Michelle comes in, left knee replacement.  Overall, reports she is doing fairly well.  Did have a little bit of a setback after a fall, but she feels like she is at least trending in the right direction at this point in time.  She is here for reassessment and examination at this time.    PHYSICAL EXAMINATION:  Examination of her knee shows motion 0 to about 115.  Ligament exam is stable and balanced.  Kneecap tracking is acceptable.  Minimal swelling seen within the joint itself.  Quad strength is still improving, but certainly not back to baseline at this time.    IMPRESSION:  Left total knee replacement.    PLAN:  Continue to work through the therapy process here.  Reassess in a month just to determine workability status at that time.  She is otherwise off to a good start here and certainly catching up in regards to her knee after her fall and trauma.    Mario Muhammad MD        D: 10/26/2021   T: 10/26/2021   MT: [id number]    Name:     MICHELLE GALVAN  MRN:      [id number]        Account:    [id number]   :      1965           Visit Date: 10/26/2021     Document: [id number]

## 2021-11-11 ENCOUNTER — TRANSFERRED RECORDS (OUTPATIENT)
Dept: HEALTH INFORMATION MANAGEMENT | Facility: OTHER | Age: 56
End: 2021-11-11
Payer: COMMERCIAL

## 2021-11-17 ENCOUNTER — OFFICE VISIT (OUTPATIENT)
Dept: ORTHOPEDICS | Facility: OTHER | Age: 56
End: 2021-11-17
Attending: ORTHOPAEDIC SURGERY
Payer: COMMERCIAL

## 2021-11-17 DIAGNOSIS — Z96.652 STATUS POST TOTAL LEFT KNEE REPLACEMENT: Primary | ICD-10-CM

## 2021-11-17 PROCEDURE — 99024 POSTOP FOLLOW-UP VISIT: CPT | Performed by: ORTHOPAEDIC SURGERY

## 2021-11-17 NOTE — PROGRESS NOTES
Patient is here for follow up on her left total knee.   Shikha Cuellar LPN .....................11/17/2021 11:10 AM

## 2021-11-17 NOTE — PROGRESS NOTES
Visit Date: 2021    REASON FOR EVALUATION:  Left knee replacement.    HISTORY OF PRESENT ILLNESS:  Michelle comes in with left knee replacement.  Overall, reports that she is doing fairly well as far as her knee is concerned, no major concerns.  Happy with the outcome of surgery at this point in time.  She is just here for standard recheck and examination.     PHYSICAL EXAMINATION:  Examination of the knee shows excellent range of motion 0 to about 120.  Limb exam stable and balanced.  Strength is slowly, but surely improving here with time.  Not quite back to baseline, but definitely making some headway at this point in time as well.    IMPRESSION:  Left total knee replacement.    PLAN:  See her back at the 1-year checkup point.  X-rays, 3 views, of the knee at that time.  No other concerns seen and identified in today's clinical examination.    Mario Muhammad MD        D: 2021   T: 2021   MT: LEXI    Name:     MICHELLE GALVAN  MRN:      [id number]        Account:    [id number]   :      1965           Visit Date: 2021     Document: [id number]

## 2021-12-05 ENCOUNTER — MYC MEDICAL ADVICE (OUTPATIENT)
Dept: ORTHOPEDICS | Facility: OTHER | Age: 56
End: 2021-12-05
Payer: COMMERCIAL

## 2021-12-06 ENCOUNTER — MYC MEDICAL ADVICE (OUTPATIENT)
Dept: FAMILY MEDICINE | Facility: OTHER | Age: 56
End: 2021-12-06
Payer: COMMERCIAL

## 2022-01-24 ENCOUNTER — OFFICE VISIT (OUTPATIENT)
Dept: FAMILY MEDICINE | Facility: OTHER | Age: 57
End: 2022-01-24
Attending: FAMILY MEDICINE
Payer: COMMERCIAL

## 2022-01-24 ENCOUNTER — HOSPITAL ENCOUNTER (OUTPATIENT)
Dept: GENERAL RADIOLOGY | Facility: OTHER | Age: 57
End: 2022-01-24
Attending: FAMILY MEDICINE
Payer: COMMERCIAL

## 2022-01-24 ENCOUNTER — HOSPITAL ENCOUNTER (OUTPATIENT)
Dept: MAMMOGRAPHY | Facility: OTHER | Age: 57
End: 2022-01-24
Attending: PHYSICIAN ASSISTANT
Payer: COMMERCIAL

## 2022-01-24 VITALS
HEART RATE: 100 BPM | OXYGEN SATURATION: 95 % | DIASTOLIC BLOOD PRESSURE: 82 MMHG | HEIGHT: 62 IN | SYSTOLIC BLOOD PRESSURE: 138 MMHG | RESPIRATION RATE: 20 BRPM | WEIGHT: 272.4 LBS | TEMPERATURE: 97.7 F | BODY MASS INDEX: 50.13 KG/M2

## 2022-01-24 DIAGNOSIS — Z12.31 VISIT FOR SCREENING MAMMOGRAM: ICD-10-CM

## 2022-01-24 DIAGNOSIS — M25.571 PAIN IN JOINT, ANKLE AND FOOT, RIGHT: ICD-10-CM

## 2022-01-24 DIAGNOSIS — M25.571 PAIN IN JOINT, ANKLE AND FOOT, RIGHT: Primary | ICD-10-CM

## 2022-01-24 PROCEDURE — 77067 SCR MAMMO BI INCL CAD: CPT

## 2022-01-24 PROCEDURE — 73610 X-RAY EXAM OF ANKLE: CPT | Mod: RT

## 2022-01-24 PROCEDURE — 99213 OFFICE O/P EST LOW 20 MIN: CPT | Performed by: FAMILY MEDICINE

## 2022-01-24 ASSESSMENT — MIFFLIN-ST. JEOR: SCORE: 1775.91

## 2022-01-24 ASSESSMENT — PAIN SCALES - GENERAL: PAINLEVEL: MILD PAIN (2)

## 2022-01-24 NOTE — PROGRESS NOTES
"SUBJECTIVE:  Michelle Du is a 56 year old female here for right ankle pain.  She reports that she has had right ankle pain for the last 1 to 2 months.  She is not sure of any particular trauma, activity or injury that brought this on.  She has a history of right heel pain but this has felt different.  Interestingly, her ankle pain has recently improved significantly.  She describes her ankle pain has been on the lateral aspect of her ankle and painful anytime she is standing or walking.  No change in footwear.  No change in activity.    ROS:    As above otherwise ROS is unremarkable.    OBJECTIVE:  /82   Pulse 100   Temp 97.7  F (36.5  C)   Resp 20   Ht 1.562 m (5' 1.5\")   Wt 123.6 kg (272 lb 6.4 oz)   LMP 05/25/2018 (Exact Date)   SpO2 95%   BMI 50.64 kg/m      EXAM:  General Appearance: Pleasant, alert, appropriate appearance for age. No acute distress  Musculoskeletal: Right ankle shows normal range of motion.  Her strength is intact and normal.  She has some mild tenderness over calcaneofibular ligament, no pain over her ATFL or PTFL.  Mild tenderness over the insertion of her Achilles.  No pain over the base of her calcaneus, midfoot, medial malleolus or fifth metatarsal.  Skin: No bruising.  Normal capillary refill distally.    Neurologic Exam: Normal distal sensation light touch.    ASSESSEMENT AND PLAN:    1. Pain in joint, ankle and foot, right      X-rays were performed, personally reviewed and shows significant spurring of her inferior and posterior calcaneus.  Suspect some of her lateral pain was due to compensation.  Recommend good arch support, ice and anti-inflammatories.  If her symptoms return and worsen despite the above treatments she will follow-up in clinic.    Zion Nunn MD  Family Medicine  "

## 2022-01-24 NOTE — NURSING NOTE
Patient presents today for right ankle and heel pain for the last month.     Medication Reconciliation Complete    Lyndsey Haley LPN  1/24/2022 1:39 PM

## 2022-04-09 ENCOUNTER — MYC MEDICAL ADVICE (OUTPATIENT)
Dept: ORTHOPEDICS | Facility: OTHER | Age: 57
End: 2022-04-09
Payer: COMMERCIAL

## 2022-08-22 DIAGNOSIS — Z96.652 STATUS POST TOTAL LEFT KNEE REPLACEMENT: Primary | ICD-10-CM

## 2022-08-23 ENCOUNTER — HOSPITAL ENCOUNTER (OUTPATIENT)
Dept: GENERAL RADIOLOGY | Facility: OTHER | Age: 57
Discharge: HOME | End: 2022-08-23
Attending: ORTHOPAEDIC SURGERY
Payer: COMMERCIAL

## 2022-08-23 ENCOUNTER — OFFICE VISIT (OUTPATIENT)
Dept: ORTHOPEDICS | Facility: OTHER | Age: 57
End: 2022-08-23
Attending: ORTHOPAEDIC SURGERY
Payer: COMMERCIAL

## 2022-08-23 DIAGNOSIS — Z96.651 STATUS POST TOTAL RIGHT KNEE REPLACEMENT: ICD-10-CM

## 2022-08-23 DIAGNOSIS — Z96.652 STATUS POST TOTAL LEFT KNEE REPLACEMENT: Primary | ICD-10-CM

## 2022-08-23 DIAGNOSIS — Z96.652 STATUS POST TOTAL LEFT KNEE REPLACEMENT: ICD-10-CM

## 2022-08-23 PROCEDURE — 99212 OFFICE O/P EST SF 10 MIN: CPT | Performed by: ORTHOPAEDIC SURGERY

## 2022-08-23 PROCEDURE — 73560 X-RAY EXAM OF KNEE 1 OR 2: CPT | Mod: RT

## 2022-08-23 NOTE — PROGRESS NOTES
Visit Date: 2022    REASON FOR EVALUATION:  Left knee replacement.    HISTORY OF PRESENT ILLNESS:  Michelle comes back, left knee replacement, 1 year out.  She is here for just routine check and examination per our request.  The patient denies any other major concerns at this time.  The patient is overall doing well.  Does have a little bit stiffness here, but feels like she is functioning and getting better here with time.    PHYSICAL EXAMINATION:  Both knee shows excellent motion 0 to about 115-120.  Ligament exam is stable and balanced.  Neurovascular examination otherwise intact.    IMAGING:  X-rays reviewed of the left knee, 3 views.  The patient's components are in stable alignment and position at this current time.    IMPRESSION:  Left knee replacement, doing very well.    PLAN:  At this time, continue to increase activities as tolerated.  She is going to plan for getting set up on antibiotics here right before her dental appointment here per her request and she will get it set forward on that point.  No other concerns otherwise seen or identified at this point in time.  She is doing great.    Mario Muhammad MD        D: 2022   T: 2022   MT: md    Name:     MICHELLE GALVAN  MRN:      [id number]        Account:    [id number]   :      1965           Visit Date: 2022     Document: [id number]

## 2022-08-23 NOTE — NURSING NOTE
"Chief Complaint   Patient presents with     Left Knee - Post-op Visit     Left total TKA s/p 1 year         Initial LMP 05/25/2018 (Exact Date)  Estimated body mass index is 50.64 kg/m  as calculated from the following:    Height as of 1/24/22: 1.562 m (5' 1.5\").    Weight as of 1/24/22: 123.6 kg (272 lb 6.4 oz).       Medication Reconciliation: Complete    Maxine Alexandra LPN .......  8/23/2022  2:32 PM   "

## 2022-11-11 ENCOUNTER — OFFICE VISIT (OUTPATIENT)
Dept: FAMILY MEDICINE | Facility: OTHER | Age: 57
End: 2022-11-11
Attending: PHYSICIAN ASSISTANT
Payer: COMMERCIAL

## 2022-11-11 VITALS
RESPIRATION RATE: 20 BRPM | OXYGEN SATURATION: 96 % | DIASTOLIC BLOOD PRESSURE: 72 MMHG | TEMPERATURE: 99.4 F | HEIGHT: 61 IN | BODY MASS INDEX: 46.44 KG/M2 | HEART RATE: 94 BPM | SYSTOLIC BLOOD PRESSURE: 144 MMHG | WEIGHT: 246 LBS

## 2022-11-11 DIAGNOSIS — J02.9 ACUTE PHARYNGITIS, UNSPECIFIED ETIOLOGY: Primary | ICD-10-CM

## 2022-11-11 DIAGNOSIS — R05.1 ACUTE COUGH: ICD-10-CM

## 2022-11-11 DIAGNOSIS — J06.9 VIRAL URI WITH COUGH: ICD-10-CM

## 2022-11-11 PROCEDURE — 99213 OFFICE O/P EST LOW 20 MIN: CPT | Performed by: NURSE PRACTITIONER

## 2022-11-11 RX ORDER — GUAIFENESIN AND DEXTROMETHORPHAN HYDROBROMIDE 600; 30 MG/1; MG/1
1 TABLET, EXTENDED RELEASE ORAL EVERY 12 HOURS
COMMUNITY
End: 2023-11-07

## 2022-11-11 ASSESSMENT — PAIN SCALES - GENERAL: PAINLEVEL: NO PAIN (0)

## 2022-11-12 NOTE — PROGRESS NOTES
ASSESSMENT/PLAN:    I have reviewed the nursing notes.  I have reviewed the findings, diagnosis, plan and need for follow up with the patient.    1. Acute pharyngitis, unspecified etiology  2. Acute cough  3. Viral URI with cough  Provided reassurance to Michelle that symptoms and exam today are most consistent with viral upper respiratory infection with cough.  She has taken a few COVID test at home that have all been negative.  Low suspicion for influenza or RSV.  Discussed that there is no indication to test for these as treatment would not change.  Continue symptomatic treatment over-the-counter medication if desired.  I discussed the majority of sore throats are caused by viruses at this time there is no indication to test for strep.  She is agreeable to this.  No antibiotics indicated for viral infections as also discussed with patient who verbalized understanding.  May continue to use over-the-counter medications as desired if needed.  -Symptomatic treatment - Encouraged fluids, salt water gargles, honey (only if greater than 1 year in age due to risk of botulism), elevation, humidifier, sinus rinse/netti pot, lozenges, tea, topical vapor rub, popsicles, rest, etc   -May use over-the-counter Tylenol or ibuprofen PRN    Discussed warning signs/symptoms indicative of need to f/u    Follow up if symptoms persist or worsen or concerns    I explained my diagnostic considerations and recommendations to the patient, who voiced understanding and agreement with the treatment plan. All questions were answered. We discussed potential side effects of any prescribed or recommended therapies, as well as expectations for response to treatments.    Alissa Cooper NP  11/11/2022  7:53 PM    HPI:  Michelle Du is a 57 year old female who presents to [facility] today for concerns of sore throat, swollen glands, low grade fever, right ear hurts. Has a cough is dry. Has had 2 weeks and 2 days of the cough which is starting to  improve. Sore throat is new. No known exposures. Had tested 2x at home for covid that were both negative. No one else at home is sick, just Michelle. Works in [employer]. Non smoker. No hx of pneumonia.   Denies fatigue and body aches. Also has slight nasal congestion/runny nose.     ROS otherwise negative.     Past Medical History:   Diagnosis Date     Anemia 3/28/2013     Benign neoplasm of colon     2014,2013-repeat colonoscopy in 2018     Diverticulosis of large intestine without perforation or abscess without bleeding     3/29/2013     Gastritis without bleeding     3/29/2013     Gastro-esophageal reflux disease without esophagitis     No Comments Provided     History of colonic polyps     3/29/2013     Insomnia     No Comments Provided     Postmenopausal bleeding 2021    Added automatically from request for surgery [id number]     Urge incontinence     2011     Past Surgical History:   Procedure Laterality Date     ARTHROPLASTY KNEE Right 2021    Procedure: ARTHROPLASTY, KNEE, TOTAL;  Surgeon: Mario Muhammad MD;  Location:  OR     ARTHROPLASTY KNEE Left 2021    Procedure: ARTHROPLASTY, KNEE, TOTAL;  Surgeon: Mario Muhammad MD;  Location:  OR     ARTHROSCOPY KNEE BILATERAL       carpel tunnel surgery - bilateral        SECTION      x 2     COLONOSCOPY  2013    3/29/13,Colonoscopy F/U      DILATION AND CURETTAGE, OPERATIVE HYSTEROSCOPY, COMBINED N/A 2021    Procedure: HYSTEROSCOPY, WITH DILATION AND CURETTAGE OF UTERUS;  Surgeon: Caridad Amaya MD;  Location:  OR     ESOPHAGOSCOPY, GASTROSCOPY, DUODENOSCOPY (EGD), COMBINED      3/29 13,EGD     LAPAROSCOPIC TUBAL LIGATION      No Comments Provided     Social History     Tobacco Use     Smoking status: Never     Smokeless tobacco: Never   Substance Use Topics     Alcohol use: No     Alcohol/week: 0.0 standard drinks     Current Outpatient Medications   Medication Sig Dispense Refill     calcium  "carbonate-vitamin D (OSCAL W/D) 500-200 MG-UNIT tablet Take 1 tablet by mouth daily        dextromethorphan-guaiFENesin (MUCINEX DM)  MG 12 hr tablet Take 1 tablet by mouth every 12 hours       Multiple Vitamins-Minerals (MULTIVITAMIN ADULT EXTRA C) CHEW Take 2 chew tab by mouth daily - gummy formulation       POTASSIUM PO Take 1 tablet by mouth daily        Allergies   Allergen Reactions     Penicillins Rash     Sulfa Drugs Rash     Past medical history, past surgical history, current medications and allergies reviewed and accurate to the best of my knowledge.      ROS:  Refer to HPI    BP (!) 144/72 (BP Location: Right arm, Patient Position: Sitting, Cuff Size: Adult Large)   Pulse 94   Temp 99.4  F (37.4  C) (Tympanic)   Resp 20   Ht 1.537 m (5' 0.5\")   Wt 111.6 kg (246 lb)   LMP 05/25/2018 (Exact Date)   SpO2 96%   BMI 47.25 kg/m      EXAM:  General Appearance: Well appearing 57 year old female, appropriate appearance for age. No acute distress   Ears: Left TM intact, translucent with bony landmarks appreciated, no erythema, no effusion, no bulging, no purulence.  Right TM intact, translucent with bony landmarks appreciated, no erythema, no effusion, no bulging, no purulence.  Left auditory canal clear.  Right auditory canal clear.  Normal external ears, non tender.  Eyes: conjunctivae normal without erythema or irritation, corneas clear, no drainage or crusting, no eyelid swelling, pupils equal   Oropharynx: moist mucous membranes, posterior pharynx without erythema, tonsils symmetric and 1+, no erythema, no exudates or petechiae, no post nasal drip seen, no trismus, voice clear.    Nose:  Bilateral nares: no erythema, no edema, + nasal congestion  Neck: supple without adenopathy  Respiratory: normal chest wall and respirations.  Normal effort.  Clear to auscultation bilaterally, no wheezing, crackles or rhonchi.  No increased work of breathing.  No cough appreciated.  Cardiac: RRR with no " murmurs  Psychological: normal affect, alert, oriented, and pleasant.

## 2022-11-12 NOTE — NURSING NOTE
Chief Complaint   Patient presents with     Throat Problem     X 2 days with swollen glands       Patient tx with mucinex, which has helped cough so she can sleep.  Tried tylenol cold and allergy meds without relief.    Advanced Care Planning on file? yes    Medication Review Completed: complete    FOOD SECURITY SCREENING QUESTIONS:    The next two questions are to help us understand your food security.  If you are feeling you need any assistance in this area, we have resources available to support you today.    Hunger Vital Signs:  Within the past 12 months we worried whether our food would run out before we got money to buy more. Never  Within the past 12 months the food we bought just didn't last and we didn't have money to get more. Never    Mariangel Mendoza LPN

## 2023-01-23 ENCOUNTER — HEALTH MAINTENANCE LETTER (OUTPATIENT)
Age: 58
End: 2023-01-23

## 2023-06-23 ENCOUNTER — HOSPITAL ENCOUNTER (OUTPATIENT)
Dept: MAMMOGRAPHY | Facility: OTHER | Age: 58
Discharge: HOME | End: 2023-06-23
Attending: PHYSICIAN ASSISTANT | Admitting: PHYSICIAN ASSISTANT
Payer: COMMERCIAL

## 2023-06-23 DIAGNOSIS — Z12.31 VISIT FOR SCREENING MAMMOGRAM: ICD-10-CM

## 2023-06-23 PROCEDURE — 77067 SCR MAMMO BI INCL CAD: CPT

## 2023-11-07 ENCOUNTER — MYC MEDICAL ADVICE (OUTPATIENT)
Dept: FAMILY MEDICINE | Facility: OTHER | Age: 58
End: 2023-11-07
Payer: COMMERCIAL

## 2023-11-07 DIAGNOSIS — M25.562 CHRONIC PAIN OF LEFT KNEE: Primary | ICD-10-CM

## 2023-11-07 DIAGNOSIS — Z96.652 S/P TKR (TOTAL KNEE REPLACEMENT), LEFT: ICD-10-CM

## 2023-11-07 DIAGNOSIS — G89.29 CHRONIC PAIN OF LEFT KNEE: Primary | ICD-10-CM

## 2024-02-24 ENCOUNTER — HEALTH MAINTENANCE LETTER (OUTPATIENT)
Age: 59
End: 2024-02-24

## 2024-02-28 NOTE — PROGRESS NOTES
02/28/24 0853   [facility] Disability Status   Are you deaf or do you have serious difficulty hearing? N   Are you blind or do you have serious difficulty seeing, even when wearing glasses? N   Because of a physical, mental, or emotional condition, do you have serious difficulty concentrating, remembering, or making decisions? (5 years old or older) N   Do you have serious difficulty walking or climbing stairs? N   Do you have serious difficulty dressing or bathing? N   Because of a physical, mental, or emotional condition, do you have serious difficulty doing errands alone such as visiting the doctor? N        Subjective: The patient is POD #1 s/p R Total Knee Arthroplasty.  The patients pain is controlled fairly well.  They deny shortness of breath, chest pain, nausea or vomiting.  There have been no acute perioperative complications. Patient reports pain but controllable    Objective:   B/P: 125/66, Temp: 98.5, Pulse: 73, Resp: 18    Alert and Orientated x3; No acute distress; Appears comfortable    Knee Exam: dressing/wound is clean, dry, intact without significant drainage.  No erythema or signs of infection.     No evidence of DVT    Distal motor/sensory exam is intact in the superficial peroneal, deep peroneal and tibial nerve distributions.      Normal capillary refill with a palpable dorsalis pedis pulse.    Hemoglobin   Date Value Ref Range Status   04/07/2021 12.7 11.7 - 15.7 g/dL Final       Assessment/Plan:     1) POD # 1 S/P R Total Knee Arthroplasty  -Pain Controlled  -PT/OT--ROM and Gait training  -DVT prophylaxis  -Dispo--To home POD#2 when cleared Medically and by PT  -Follow-up in 2 weeks [facility]  -Hospitalist co-management  PT orders sent to [facility]

## 2024-05-28 SDOH — HEALTH STABILITY: PHYSICAL HEALTH: ON AVERAGE, HOW MANY DAYS PER WEEK DO YOU ENGAGE IN MODERATE TO STRENUOUS EXERCISE (LIKE A BRISK WALK)?: 0 DAYS

## 2024-05-28 SDOH — HEALTH STABILITY: PHYSICAL HEALTH: ON AVERAGE, HOW MANY MINUTES DO YOU ENGAGE IN EXERCISE AT THIS LEVEL?: 0 MIN

## 2024-05-28 ASSESSMENT — ANXIETY QUESTIONNAIRES
7. FEELING AFRAID AS IF SOMETHING AWFUL MIGHT HAPPEN: NOT AT ALL
6. BECOMING EASILY ANNOYED OR IRRITABLE: NOT AT ALL
GAD7 TOTAL SCORE: 0
IF YOU CHECKED OFF ANY PROBLEMS ON THIS QUESTIONNAIRE, HOW DIFFICULT HAVE THESE PROBLEMS MADE IT FOR YOU TO DO YOUR WORK, TAKE CARE OF THINGS AT HOME, OR GET ALONG WITH OTHER PEOPLE: NOT DIFFICULT AT ALL
GAD7 TOTAL SCORE: 0
4. TROUBLE RELAXING: NOT AT ALL
7. FEELING AFRAID AS IF SOMETHING AWFUL MIGHT HAPPEN: NOT AT ALL
1. FEELING NERVOUS, ANXIOUS, OR ON EDGE: NOT AT ALL
5. BEING SO RESTLESS THAT IT IS HARD TO SIT STILL: NOT AT ALL
8. IF YOU CHECKED OFF ANY PROBLEMS, HOW DIFFICULT HAVE THESE MADE IT FOR YOU TO DO YOUR WORK, TAKE CARE OF THINGS AT HOME, OR GET ALONG WITH OTHER PEOPLE?: NOT DIFFICULT AT ALL
3. WORRYING TOO MUCH ABOUT DIFFERENT THINGS: NOT AT ALL
2. NOT BEING ABLE TO STOP OR CONTROL WORRYING: NOT AT ALL

## 2024-05-28 ASSESSMENT — SOCIAL DETERMINANTS OF HEALTH (SDOH): HOW OFTEN DO YOU GET TOGETHER WITH FRIENDS OR RELATIVES?: ONCE A WEEK

## 2024-05-29 ENCOUNTER — OFFICE VISIT (OUTPATIENT)
Dept: FAMILY MEDICINE | Facility: OTHER | Age: 59
End: 2024-05-29
Attending: PHYSICIAN ASSISTANT
Payer: COMMERCIAL

## 2024-05-29 VITALS
WEIGHT: 255.2 LBS | SYSTOLIC BLOOD PRESSURE: 142 MMHG | BODY MASS INDEX: 46.96 KG/M2 | OXYGEN SATURATION: 99 % | HEIGHT: 62 IN | TEMPERATURE: 98.2 F | DIASTOLIC BLOOD PRESSURE: 78 MMHG | HEART RATE: 71 BPM

## 2024-05-29 DIAGNOSIS — Z11.59 ENCOUNTER FOR HEPATITIS C SCREENING TEST FOR LOW RISK PATIENT: ICD-10-CM

## 2024-05-29 DIAGNOSIS — Z80.49 FAMILY HISTORY OF CERVICAL CANCER: ICD-10-CM

## 2024-05-29 DIAGNOSIS — Z00.00 ROUTINE GENERAL MEDICAL EXAMINATION AT A HEALTH CARE FACILITY: Primary | ICD-10-CM

## 2024-05-29 DIAGNOSIS — Z13.220 SCREENING CHOLESTEROL LEVEL: ICD-10-CM

## 2024-05-29 DIAGNOSIS — E55.9 HYPOVITAMINOSIS D: ICD-10-CM

## 2024-05-29 DIAGNOSIS — Z11.4 SCREENING FOR HIV (HUMAN IMMUNODEFICIENCY VIRUS): ICD-10-CM

## 2024-05-29 DIAGNOSIS — Z12.11 COLON CANCER SCREENING: ICD-10-CM

## 2024-05-29 DIAGNOSIS — Z13.1 SCREENING FOR DIABETES MELLITUS: ICD-10-CM

## 2024-05-29 PROBLEM — M25.562 LEFT KNEE PAIN: Status: RESOLVED | Noted: 2021-02-04 | Resolved: 2024-05-29

## 2024-05-29 PROBLEM — M23.203 OLD TEAR OF MEDIAL MENISCUS OF RIGHT KNEE: Status: RESOLVED | Noted: 2017-04-11 | Resolved: 2024-05-29

## 2024-05-29 LAB
ANION GAP SERPL CALCULATED.3IONS-SCNC: 9 MMOL/L (ref 7–15)
BASOPHILS # BLD AUTO: 0 10E3/UL (ref 0–0.2)
BASOPHILS NFR BLD AUTO: 1 %
BUN SERPL-MCNC: 9.2 MG/DL (ref 6–20)
CALCIUM SERPL-MCNC: 9.6 MG/DL (ref 8.6–10)
CHLORIDE SERPL-SCNC: 105 MMOL/L (ref 98–107)
CHOLEST SERPL-MCNC: 246 MG/DL
CREAT SERPL-MCNC: 0.75 MG/DL (ref 0.51–0.95)
DEPRECATED HCO3 PLAS-SCNC: 28 MMOL/L (ref 22–29)
EGFRCR SERPLBLD CKD-EPI 2021: >90 ML/MIN/1.73M2
EOSINOPHIL # BLD AUTO: 0.2 10E3/UL (ref 0–0.7)
EOSINOPHIL NFR BLD AUTO: 4 %
ERYTHROCYTE [DISTWIDTH] IN BLOOD BY AUTOMATED COUNT: 12.9 % (ref 10–15)
FASTING STATUS PATIENT QL REPORTED: YES
FASTING STATUS PATIENT QL REPORTED: YES
GLUCOSE SERPL-MCNC: 92 MG/DL (ref 70–99)
HCT VFR BLD AUTO: 43.6 % (ref 35–47)
HDLC SERPL-MCNC: 53 MG/DL
HGB BLD-MCNC: 14 G/DL (ref 11.7–15.7)
IMM GRANULOCYTES # BLD: 0 10E3/UL
IMM GRANULOCYTES NFR BLD: 1 %
LDLC SERPL CALC-MCNC: 153 MG/DL
LYMPHOCYTES # BLD AUTO: 1.5 10E3/UL (ref 0.8–5.3)
LYMPHOCYTES NFR BLD AUTO: 27 %
MCH RBC QN AUTO: 29.6 PG (ref 26.5–33)
MCHC RBC AUTO-ENTMCNC: 32.1 G/DL (ref 31.5–36.5)
MCV RBC AUTO: 92 FL (ref 78–100)
MONOCYTES # BLD AUTO: 0.5 10E3/UL (ref 0–1.3)
MONOCYTES NFR BLD AUTO: 8 %
NEUTROPHILS # BLD AUTO: 3.3 10E3/UL (ref 1.6–8.3)
NEUTROPHILS NFR BLD AUTO: 60 %
NONHDLC SERPL-MCNC: 193 MG/DL
NRBC # BLD AUTO: 0 10E3/UL
NRBC BLD AUTO-RTO: 0 /100
PLATELET # BLD AUTO: 237 10E3/UL (ref 150–450)
POTASSIUM SERPL-SCNC: 5 MMOL/L (ref 3.4–5.3)
RBC # BLD AUTO: 4.73 10E6/UL (ref 3.8–5.2)
SODIUM SERPL-SCNC: 142 MMOL/L (ref 135–145)
TRIGL SERPL-MCNC: 200 MG/DL
WBC # BLD AUTO: 5.5 10E3/UL (ref 4–11)

## 2024-05-29 PROCEDURE — 85025 COMPLETE CBC W/AUTO DIFF WBC: CPT | Mod: ZL | Performed by: PHYSICIAN ASSISTANT

## 2024-05-29 PROCEDURE — 80061 LIPID PANEL: CPT | Mod: ZL | Performed by: PHYSICIAN ASSISTANT

## 2024-05-29 PROCEDURE — 80048 BASIC METABOLIC PNL TOTAL CA: CPT | Mod: ZL | Performed by: PHYSICIAN ASSISTANT

## 2024-05-29 PROCEDURE — 87389 HIV-1 AG W/HIV-1&-2 AB AG IA: CPT | Mod: ZL | Performed by: PHYSICIAN ASSISTANT

## 2024-05-29 PROCEDURE — 36415 COLL VENOUS BLD VENIPUNCTURE: CPT | Mod: ZL | Performed by: PHYSICIAN ASSISTANT

## 2024-05-29 PROCEDURE — 82306 VITAMIN D 25 HYDROXY: CPT | Mod: ZL | Performed by: PHYSICIAN ASSISTANT

## 2024-05-29 PROCEDURE — 86803 HEPATITIS C AB TEST: CPT | Mod: ZL | Performed by: PHYSICIAN ASSISTANT

## 2024-05-29 PROCEDURE — 99396 PREV VISIT EST AGE 40-64: CPT | Performed by: PHYSICIAN ASSISTANT

## 2024-05-29 NOTE — PROGRESS NOTES
"Preventive Care Visit  [facility]  Cristal Naranjo PA-C, Family Medicine  May 29, 2024      Assessment & Plan   Problem List Items Addressed This Visit          Digestive    Hypovitaminosis D    Relevant Orders    Vitamin D Total       Other    Family history of cervical cancer     Other Visit Diagnoses       Routine general medical examination at a health care facility    -  Primary    Screening cholesterol level        Relevant Orders    CBC and Differential (Completed)    Lipid Panel    Screening for diabetes mellitus        Relevant Orders    Basic Metabolic Panel    CBC and Differential (Completed)    Encounter for hepatitis C screening test for low risk patient        Relevant Orders    Hepatitis C Screen Reflex to HCV RNA Quant and Genotype    Screening for HIV (human immunodeficiency virus)        Relevant Orders    HIV Antigen Antibody Combo    Colon cancer screening        Relevant Orders    Adult GI  Referral - Procedure Only           Hypovitaminosis D: Completed vitamin D for monitoring.  Labs pending.    Family history of cervical cancer: Up-to-date on Pap test.  Currently asymptomatic.  Testing in 2021 was unremarkable.  Can refer to OB/GYN in the future if needed to discuss family history.    Completed hepatitis C and HIV for lower screening.    Ordered colonoscopy for colon cancer screening.    Completed cholesterol and diabetes screening.    Encourage good diet, exercise, weight loss, and decreasing salt in her diet.  Blood pressure is mildly elevated.  Gave DASH diet information.  Encouraged to closely monitor blood pressure over the next month.  Recheck as needed.    Repeat physical in 1 year.    Patient has been advised of split billing requirements and indicates understanding: Yes       BMI  Estimated body mass index is 46.68 kg/m  as calculated from the following:    Height as of this encounter: 1.575 m (5' 2\").    Weight as of this encounter: 115.8 kg (255 lb 3.2 " oz).   Weight management plan: Discussed healthy diet and exercise guidelines    Counseling  Appropriate preventive services were discussed with this patient, including applicable screening as appropriate for fall prevention, nutrition, physical activity, Tobacco-use cessation, weight loss and cognition.  Checklist reviewing preventive services available has been given to the patient.  Reviewed patient's diet, addressing concerns and/or questions.     See Patient Instructions    Return in about 53 weeks (around 2025) for Annual Wellness Visit.      Orquidea Martinez is a 58 year old, presenting for the following:  Physical        2024     9:07 AM   Additional Questions   Roomed by Jaleesa CARNEY CMA        Health Care Directive  Patient has a Health Care Directive on file  Advance care planning document is on file and is current.    HPI    Patient's last menstrual period was 2018 (exact date).   Contraception: tubal ligation  Risk for STI?: none  Last pap: 2017 - normal  2021 - normal pap and HPV, repeat in 5 years  Any hx of abnormal paps:  none  FH of early CA?: no  Cholesterol/DM concerns/screenin, repeated  Tobacco?: no  Lung cancer screening: na  Calcium intake: yes  DEXA: 2021 - normal, repeat in 5 years  Last mammo: 2023  Colonoscopy: 3/29/13,Colonoscopy F/U  tubular adenoma, declines scheduling repeat at this time   Hepatitis C screen: none, ordered  HIV screen: none, ordered  Immunizations: declines vaccines     Patient's 3 sisters with cervical cancer history.  Up-to-date on her Pap test.  In  she had pelvic ultrasound with a biopsy which was negative.  Currently asymptomatic.  No abnormal vaginal bleeding or spotting.    Trying to lose weight.  Has worked on weight watchers and the Noom diet in the past.  Had a plateau.  Wondering what she can do to help lose weight.  Has not checked her blood pressure recently.    The 10-year ASCVD risk score (Fernandez DK, et al.,  2019) is: 3.6%    Values used to calculate the score:      Age: 58 years      Sex: Female      Is Non- : No      Diabetic: No      Tobacco smoker: No      Systolic Blood Pressure: 142 mmHg      Is BP treated: No      HDL Cholesterol: 59 mg/dL      Total Cholesterol: 260 mg/dL          5/28/2024   General Health   How would you rate your overall physical health? Good   Feel stress (tense, anxious, or unable to sleep) Not at all         5/28/2024   Nutrition   Three or more servings of calcium each day? Yes   Diet: Regular (no restrictions)   How many servings of fruit and vegetables per day? (!) 0-1   How many sweetened beverages each day? 0-1         5/28/2024   Exercise   Days per week of moderate/strenous exercise 0 days   Average minutes spent exercising at this level 0 min   (!) EXERCISE CONCERN      5/28/2024   Social Factors   Frequency of gathering with friends or relatives Once a week   Worry food won't last until get money to buy more No   Food not last or not have enough money for food? No   Do you have housing?  Yes   Are you worried about losing your housing? No   Lack of transportation? No   Unable to get utilities (heat,electricity)? No         5/28/2024   Fall Risk   Fallen 2 or more times in the past year? No   Trouble with walking or balance? No          5/28/2024   Dental   Dentist two times every year? Yes         5/28/2024   TB Screening   Were you born outside of the [place]? No         Today's PHQ-2 Score:       5/28/2024    12:46 PM   PHQ-2 ( 1999 [employer])   Q1: Little interest or pleasure in doing things 0   Q2: Feeling down, depressed or hopeless 0   PHQ-2 Score 0   Q1: Little interest or pleasure in doing things Not at all   Q2: Feeling down, depressed or hopeless Not at all   PHQ-2 Score 0           5/28/2024   Substance Use   Alcohol more than 3/day or more than 7/wk No   Do you use any other substances recreationally? No     Social History     Tobacco Use    Smoking  status: Never     Passive exposure: Never    Smokeless tobacco: Never   Vaping Use    Vaping status: Never Used   Substance Use Topics    Alcohol use: No     Alcohol/week: 0.0 standard drinks of alcohol    Drug use: No           6/23/2023   LAST FHS-7 RESULTS   1st degree relative breast or ovarian cancer No   Any relative bilateral breast cancer No   Any male have breast cancer No   Any ONE woman have BOTH breast AND ovarian cancer No   Any woman with breast cancer before 50yrs No   2 or more relatives with breast AND/OR ovarian cancer No   2 or more relatives with breast AND/OR bowel cancer No        Mammogram Screening - Mammogram every 1-2 years updated in Health Maintenance based on mutual decision making          5/28/2024   One time HIV Screening   Previous HIV test? Decline         5/28/2024   STI Screening   New sexual partner(s) since last STI/HIV test? (!) DECLINE     History of abnormal Pap smear: No - age 30- 64 PAP with HPV every 5 years recommended        Latest Ref Rng & Units 1/7/2021     8:42 AM   PAP / HPV   PAP (Historical)  NIL    HPV 16 DNA NEG^Negative Negative    HPV 18 DNA NEG^Negative Negative    Other HR HPV NEG^Negative Negative      ASCVD Risk   The 10-year ASCVD risk score (Fernandez ORTEGA, et al., 2019) is: 3.6%    Values used to calculate the score:      Age: 58 years      Sex: Female      Is Non- : No      Diabetic: No      Tobacco smoker: No      Systolic Blood Pressure: 142 mmHg      Is BP treated: No      HDL Cholesterol: 59 mg/dL      Total Cholesterol: 260 mg/dL           Reviewed and updated as needed this visit by Provider   Tobacco  Allergies  Meds  Problems  Med Hx  Surg Hx  Fam Hx            Past Medical History:   Diagnosis Date    Anemia 03/28/2013    Benign neoplasm of colon     6/19/2014,March 2013-repeat colonoscopy in 2018    Diverticulosis of large intestine without perforation or abscess without bleeding     3/29/2013    Gastritis  without bleeding     3/29/2013    Gastro-esophageal reflux disease without esophagitis     No Comments Provided    History of colonic polyps     3/29/2013    Insomnia     No Comments Provided    Old tear of medial meniscus of right knee 2017    Postmenopausal bleeding 2021    Added automatically from request for surgery [id number]    Urge incontinence     2011     Past Surgical History:   Procedure Laterality Date    ARTHROPLASTY KNEE Right 2021    Procedure: ARTHROPLASTY, KNEE, TOTAL;  Surgeon: Mario Muhammad MD;  Location: [facility] OR    ARTHROPLASTY KNEE Left 2021    Procedure: ARTHROPLASTY, KNEE, TOTAL;  Surgeon: Mario Muhammad MD;  Location: [facility] OR    ARTHROSCOPY KNEE BILATERAL      carpel tunnel surgery - bilateral       SECTION      x 2    COLONOSCOPY  2013    3/29/13,Colonoscopy F/U     DILATION AND CURETTAGE, OPERATIVE HYSTEROSCOPY, COMBINED N/A 2021    Procedure: HYSTEROSCOPY, WITH DILATION AND CURETTAGE OF UTERUS;  Surgeon: Caridad Amaya MD;  Location:  OR    ESOPHAGOSCOPY, GASTROSCOPY, DUODENOSCOPY (EGD), COMBINED      3/29 13,EGD    LAPAROSCOPIC TUBAL LIGATION      No Comments Provided     OB History   No obstetric history on file.     Labs reviewed in EPIC  BP Readings from Last 3 Encounters:   24 (!) 142/78   22 (!) 144/72   22 138/82    Wt Readings from Last 3 Encounters:   24 115.8 kg (255 lb 3.2 oz)   22 111.6 kg (246 lb)   22 123.6 kg (272 lb 6.4 oz)                  Patient Active Problem List   Diagnosis    Hyperlipidemia    Hypovitaminosis D    Tubular adenoma of colon    Morbid obesity (H)    SVETLANA (obstructive sleep apnea)    Family history of cervical cancer     Past Surgical History:   Procedure Laterality Date    ARTHROPLASTY KNEE Right 2021    Procedure: ARTHROPLASTY, KNEE, TOTAL;  Surgeon: Mario Muhammad MD;  Location: [facility] OR    ARTHROPLASTY KNEE Left 2021    Procedure: ARTHROPLASTY, KNEE,  TOTAL;  Surgeon: Mario Muhammad MD;  Location: [facility] OR    ARTHROSCOPY KNEE BILATERAL      carpel tunnel surgery - bilateral       SECTION      x 2    COLONOSCOPY  2013    3/29/13,Colonoscopy F/U     DILATION AND CURETTAGE, OPERATIVE HYSTEROSCOPY, COMBINED N/A 2021    Procedure: HYSTEROSCOPY, WITH DILATION AND CURETTAGE OF UTERUS;  Surgeon: Caridad Amaya MD;  Location: [facility] OR    ESOPHAGOSCOPY, GASTROSCOPY, DUODENOSCOPY (EGD), COMBINED      3/29 13,EGD    LAPAROSCOPIC TUBAL LIGATION      No Comments Provided       Social History     Tobacco Use    Smoking status: Never     Passive exposure: Never    Smokeless tobacco: Never   Substance Use Topics    Alcohol use: No     Alcohol/week: 0.0 standard drinks of alcohol     Family History   Problem Relation Age of Onset    Heart Disease Mother         Heart Disease,acute MI    Diabetes Father         Diabetes    Hypertension Father         Hypertension    Other - See Comments Father         metastatic melanoma    Liver Cancer Father     Cervical Cancer Sister         uterine?    Diabetes Sister     Hypertension Sister     Hypertension Sister     Cervical Cancer Sister     Cervical Cancer Sister     Breast Cancer No family hx of         Cancer-breast         No current outpatient medications on file.     Allergies   Allergen Reactions    Penicillins Rash    Sulfa Antibiotics Rash     Recent Labs   Lab Test 21  1600 21  0909 18  1327 17  1440 17  0829   LDL  --  175*  --   --  172*   HDL  --  59  --   --  48   TRIG  --  129  --   --  148   CR 0.89 0.77 0.65   < > 0.76   GFRESTIMATED 74 78 >90   < >  --    GFRESTBLACK  --  >90 >90   < > >60   POTASSIUM 4.0 4.3 4.4   < > 4.3    < > = values in this interval not displayed.          Review of Systems  Constitutional, neuro, ENT, endocrine, pulmonary, cardiac, gastrointestinal, genitourinary, musculoskeletal, integument and psychiatric systems are negative, except as otherwise  "noted.     Objective    Exam  BP (!) 142/78   Pulse 71   Temp 98.2  F (36.8  C) (Tympanic)   Ht 1.575 m (5' 2\")   Wt 115.8 kg (255 lb 3.2 oz)   LMP 05/25/2018 (Exact Date)   SpO2 99%   BMI 46.68 kg/m     Estimated body mass index is 46.68 kg/m  as calculated from the following:    Height as of this encounter: 1.575 m (5' 2\").    Weight as of this encounter: 115.8 kg (255 lb 3.2 oz).    Physical Exam  GENERAL: alert and no distress  EYES: Eyes grossly normal to inspection, PERRL and conjunctivae and sclerae normal  HENT: ear canals and TM's normal, nose and mouth without ulcers or lesions  NECK: no adenopathy, no asymmetry, masses, or scars  RESP: lungs clear to auscultation - no rales, rhonchi or wheezes  CV: regular rate and rhythm, normal S1 S2, no S3 or S4, no murmur, click or rub, no peripheral edema  ABDOMEN: soft, nontender, no hepatosplenomegaly, no masses and bowel sounds normal  MS: no gross musculoskeletal defects noted, no edema  SKIN: no suspicious lesions or rashes  NEURO: Normal strength and tone, mentation intact and speech normal  PSYCH: mentation appears normal, affect normal/bright    Answers submitted by the patient for this visit:  JACKIE-7 (Submitted on 5/28/2024)  JACKIE 7 TOTAL SCORE: 0      Signed Electronically by: Cristal Naranjo PA-C    "

## 2024-05-29 NOTE — NURSING NOTE
"Chief Complaint   Patient presents with    Physical     Patient here for physical. Is concerned about cervical cancer. Three sisters have it.  Initial BP (!) 142/78   Pulse 71   Temp 98.2  F (36.8  C) (Tympanic)   Ht 1.575 m (5' 2\")   Wt 115.8 kg (255 lb 3.2 oz)   LMP 05/25/2018 (Exact Date)   SpO2 99%   BMI 46.68 kg/m   Estimated body mass index is 46.68 kg/m  as calculated from the following:    Height as of this encounter: 1.575 m (5' 2\").    Weight as of this encounter: 115.8 kg (255 lb 3.2 oz).  Medication Review: complete    The next two questions are to help us understand your food security.  If you are feeling you need any assistance in this area, we have resources available to support you today.          5/28/2024   SDOH- Food Insecurity   Within the past 12 months, did you worry that your food would run out before you got money to buy more? N   Within the past 12 months, did the food you bought just not last and you didn t have money to get more? N         Health Care Directive:  Patient has a Health Care Directive on file      Jaleesa Marino MA      "

## 2024-05-29 NOTE — PATIENT INSTRUCTIONS
"Preventive Care Advice   This is general advice we often give to help people stay healthy. Your care team may have specific advice just for you. Please talk to your care team about your own preventive care needs.  Lifestyle  Exercise at least 150 minutes each week (30 minutes a day, 5 days a week).  Do muscle strengthening activities 2 days a week. These help control your weight and prevent disease.  No smoking.  Wear sunscreen to prevent skin cancer.  Have your home tested for radon every 2 to 5 years. Radon is a colorless, odorless gas that can harm your lungs. To learn more, go to www.health.[facility].mn. and search for \"Radon in Homes.\"  Keep guns unloaded and locked up in a safe place like a safe or gun vault, or, use a gun lock and hide the keys. Always lock away bullets separately. To learn more, visit [employer].[place].gov and search for \"safe gun storage.\"  Nutrition  Eat 5 or more servings of fruits and vegetables each day.  Try wheat bread, brown rice and whole grain pasta (instead of white bread, rice, and pasta).  Get enough calcium and vitamin D. Check the label on foods and aim for 100% of the RDA (recommended daily allowance).  Regular exams  Have a dental exam and cleaning every 6 months.  See your health care team every year to talk about:  Any changes in your health.  Any medicines your care team has prescribed.  Preventive care, family planning, and ways to prevent chronic diseases.  Shots (vaccines)   HPV shots (up to age 26), if you've never had them before.  Hepatitis B shots (up to age 59), if you've never had them before.  COVID-19 shot: Get this shot when it's due.  Flu shot: Get a flu shot every year.  Tetanus shot: Get a tetanus shot every 10 years.  Pneumococcal, hepatitis A, and RSV shots: Ask your care team if you need these based on your risk.  Shingles shot (for age 50 and up).  General health tests  Diabetes screening:  Starting at age 35, Get screened for diabetes at least every 3 years.  If " you are younger than age 35, ask your care team if you should be screened for diabetes.  Cholesterol test: At age 39, start having a cholesterol test every 5 years, or more often if advised.  Bone density scan (DEXA): At age 50, ask your care team if you should have this scan for osteoporosis (brittle bones).  Hepatitis C: Get tested at least once in your life.  Abdominal aortic aneurysm screening: Talk to your doctor about having this screening if you:  Have ever smoked; and  Are biologically male; and  Are between the ages of 65 and 75.  STIs (sexually transmitted infections)  Before age 24: Ask your care team if you should be screened for STIs.  After age 24: Get screened for STIs if you're at risk. You are at risk for STIs (including HIV) if:  You are sexually active with more than one person.  You don't use condoms every time.  You or a partner was diagnosed with a sexually transmitted infection.  If you are at risk for HIV, ask about PrEP medicine to prevent HIV.  Get tested for HIV at least once in your life, whether you are at risk for HIV or not.  Cancer screening tests  Cervical cancer screening: If you have a cervix, begin getting regular cervical cancer screening tests at age 21. Most people who have regular screenings with normal results can stop after age 65. Talk about this with your provider.  Breast cancer scan (mammogram): If you've ever had breasts, begin having regular mammograms starting at age 40. This is a scan to check for breast cancer.  Colon cancer screening: It is important to start screening for colon cancer at age 45.  Have a colonoscopy test every 10 years (or more often if you're at risk) Or, ask your provider about stool tests like a FIT test every year or Cologuard test every 3 years.  To learn more about your testing options, visit: www.[employer]/386034.pdf.  For help making a decision, visit: mak/yx61322.  Prostate cancer screening test: If you have a prostate and are age 55  "to 69, ask your provider if you would benefit from a yearly prostate cancer screening test.  Lung cancer screening: If you are a current or former smoker age 50 to 80, ask your care team if ongoing lung cancer screenings are right for you.  For informational purposes only. Not to replace the advice of your health care provider. Copyright   2023 [facility]. All rights reserved. Clinically reviewed by the [facility]. No [employer] 187927 - REV 04/24.    Learning About Being Physically Active  What is physical activity?     Being physically active means doing any kind of activity that gets your body moving.  The types of physical activity that can help you get fit and stay healthy include:  Aerobic or \"cardio\" activities. These make your heart beat faster and make you breathe harder, such as brisk walking, riding a bike, or running. They strengthen your heart and lungs and build up your endurance.  Strength training activities. These make your muscles work against, or \"resist,\" something. Examples include lifting weights or doing push-ups. These activities help tone and strengthen your muscles and bones.  Stretches. These let you move your joints and muscles through their full range of motion. Stretching helps you be more flexible.  Reaching a balance between these three types of physical activity is important because each one contributes to your overall fitness.  What are the benefits of being active?  Being active is one of the best things you can do for your health. It helps you to:  Feel stronger and have more energy to do all the things you like to do.  Focus better at school or work.  Feel, think, and sleep better.  Reach and stay at a healthy weight.  Lose fat and build lean muscle.  Lower your risk for serious health problems, including diabetes, heart attack, high blood pressure, and some cancers.  Keep your heart, lungs, bones, muscles, and joints strong and healthy.  How " "can you make being active part of your life?  Start slowly. Make it your long-term goal to get at least 30 minutes of exercise on most days of the week. Walking is a good choice. You also may want to do other activities, such as running, swimming, cycling, or playing tennis or team sports.  Pick activities that you like--ones that make your heart beat faster, your muscles stronger, and your muscles and joints more flexible. If you find more than one thing you like doing, do them all. You don't have to do the same thing every day.  Get your heart pumping every day. Any activity that makes your heart beat faster and keeps it at that rate for a while counts.  Here are some great ways to get your heart beating faster:  Go for a brisk walk, run, or hike.  Go for a swim or bike ride.  Take an online exercise class or dance.  Play a game of touch football, basketball, or soccer.  Play tennis, pickleball, or racquetball.  Climb stairs.  Even some household chores can be aerobic. Just do them at a faster pace. Raking or mowing the lawn, sweeping the garage, and vacuuming and cleaning your home all can help get your heart rate up.  Strengthen your muscles during the week. You don't have to lift heavy weights or grow big, bulky muscles to get stronger. Doing a few simple activities that make your muscles work against, or \"resist,\" something can help you get stronger. Aim for at least twice a week.  For example, you can:  Do push-ups or sit-ups, which use your own body weight as resistance.  Lift weights or dumbbells or use stretch bands at home or in a gym or community center.  Stretch your muscles often. Stretching will help you as you become more active. It can help you stay flexible and loosen tight muscles. It can also help improve your balance and posture and can be a great way to relax.  Be sure to stretch the muscles you'll be using when you work out. It's best to warm your muscles slightly before you stretch them. Walk " "or do some other light aerobic activity for a few minutes. Then start stretching.  When you stretch your muscles:  Do it slowly. Stretching is not about going fast or making sudden movements.  Don't push or bounce during a stretch.  Hold each stretch for at least 15 to 30 seconds, if you can. You should feel a stretch in the muscle, but not pain.  Breathe out as you do the stretch. Then breathe in as you hold the stretch. Don't hold your breath.  If you're worried about how more activity might affect your health, have a checkup before you start. Follow any special advice your doctor gives you for getting a smart start.  Where can you learn more?  Go to https://www.[employer].net/patiented  Enter W332 in the search box to learn more about \"Learning About Being Physically Active.\"  Current as of: June 5, 2023               Content Version: 14.0    7010-5778 [employer].   Care instructions adapted under license by your healthcare professional. If you have questions about a medical condition or this instruction, always ask your healthcare professional. [employer] disclaims any warranty or liability for your use of this information.      Eating Healthy Foods: Care Instructions  With every meal, you can make healthy food choices. Try to eat a variety of fruits, vegetables, whole grains, lean proteins, and low-fat dairy products. This can help you get the right balance of nutrients, including vitamins and minerals. Small changes add up over time. You can start by adding one healthy food to your meals each day.    Try to make half your plate fruits and vegetables, one-fourth whole grains, and one-fourth lean proteins. Try including dairy with your meals.   Eat more fruits and vegetables. Try to have them with most meals and snacks.   Foods for healthy eating    Fruits    These can be fresh, frozen, canned, or dried.  Try to choose whole fruit rather than fruit juice.  Eat a variety of colors.    " "Vegetables    These can be fresh, frozen, canned, or dried.  Beans, peas, and lentils count too.    Whole grains    Choose whole-grain breads, cereals, and noodles.  Try brown rice.    Lean proteins    These can include lean meat, poultry, fish, and eggs.  You can also have tofu, beans, peas, lentils, nuts, and seeds.    Dairy    Try milk, yogurt, and cheese.  Choose low-fat or fat-free when you can.  If you need to, use lactose-free milk or fortified plant-based milk products, such as soy milk.    Water    Drink water when you're thirsty.  Limit sugar-sweetened drinks, including soda, fruit drinks, and sports drinks.  Where can you learn more?  Go to https://www.[employer].net/patiented  Enter T756 in the search box to learn more about \"Eating Healthy Foods: Care Instructions.\"  Current as of: September 20, 2023               Content Version: 14.0    2660-6151 [employer].   Care instructions adapted under license by your healthcare professional. If you have questions about a medical condition or this instruction, always ask your healthcare professional. [employer] disclaims any warranty or liability for your use of this information.        Blood pressure is elevated today. Encouraged to monitor blood pressure a couple times a week over the next few weeks. Return in 3-4 weeks if blood pressure is persistently elevated for a recheck appointment. Work on diet and exercise to decrease blood pressure. Weight loss is helpful.  Decrease salt intake.      -- Learn about DASH Diet (http://bit.[employer]/DASHDiet - redirects to the NIH) for dietary ways to reduce blood pressure      DASH Diet: Care Instructions  Your Care Instructions     The DASH diet is an eating plan that can help lower your blood pressure. DASH stands for Dietary Approaches to Stop Hypertension. Hypertension is high blood pressure.  The DASH diet focuses on eating foods that are high in calcium, potassium, and magnesium. These " nutrients can lower blood pressure. The foods that are highest in these nutrients are fruits, vegetables, low-fat dairy products, nuts, seeds, and legumes. But taking calcium, potassium, and magnesium supplements instead of eating foods that are high in those nutrients does not have the same effect. The DASH diet also includes whole grains, fish, and poultry.  The DASH diet is one of several lifestyle changes your doctor may recommend to lower your high blood pressure. Your doctor may also want you to decrease the amount of sodium in your diet. Lowering sodium while following the DASH diet can lower blood pressure even further than just the DASH diet alone.  Follow-up care is a key part of your treatment and safety. Be sure to make and go to all appointments, and call your doctor if you are having problems. It's also a good idea to know your test results and keep a list of the medicines you take.  How can you care for yourself at home?  Following the DASH diet  Eat 4 to 5 servings of fruit each day. A serving is 1 medium-sized piece of fruit,   cup chopped or canned fruit, 1/4 cup dried fruit, or 4 ounces (  cup) of fruit juice. Choose fruit more often than fruit juice.  Eat 4 to 5 servings of vegetables each day. A serving is 1 cup of lettuce or raw leafy vegetables,   cup of chopped or cooked vegetables, or 4 ounces (  cup) of vegetable juice. Choose vegetables more often than vegetable juice.  Get 2 to 3 servings of low-fat and fat-free dairy each day. A serving is 8 ounces of milk, 1 cup of yogurt, or 1   ounces of cheese.  Eat 6 to 8 servings of grains each day. A serving is 1 slice of bread, 1 ounce of dry cereal, or   cup of cooked rice, pasta, or cooked cereal. Try to choose whole-grain products as much as possible.  Limit lean meat, poultry, and fish to 2 servings each day. A serving is 3 ounces, about the size of a deck of cards.  Eat 4 to 5 servings of nuts, seeds, and legumes (cooked dried beans,  lentils, and split peas) each week. A serving is 1/3 cup of nuts, 2 tablespoons of seeds, or   cup of cooked beans or peas.  Limit fats and oils to 2 to 3 servings each day. A serving is 1 teaspoon of vegetable oil or 2 tablespoons of salad dressing.  Limit sweets and added sugars to 5 servings or less a week. A serving is 1 tablespoon jelly or jam,   cup sorbet, or 1 cup of lemonade.  Eat less than 2,300 milligrams (mg) of sodium a day. If you limit your sodium to 1,500 mg a day, you can lower your blood pressure even more.  Be aware that all of these are the suggested number of servings for people who eat 1,800 to 2,000 calories a day. Your recommended number of servings may be different if you need more or fewer calories.  Tips for success  Start small. Do not try to make dramatic changes to your diet all at once. You might feel that you are missing out on your favorite foods and then be more likely to not follow the plan. Make small changes, and stick with them. Once those changes become habit, add a few more changes.  Try some of the following:  Make it a goal to eat a fruit or vegetable at every meal and at snacks. This will make it easy to get the recommended amount of fruits and vegetables each day.  Try yogurt topped with fruit and nuts for a snack or healthy dessert.  Add lettuce, tomato, cucumber, and onion to sandwiches.  Combine a ready-made pizza crust with low-fat mozzarella cheese and lots of vegetable toppings. Try using tomatoes, squash, spinach, broccoli, carrots, cauliflower, and onions.  Have a variety of cut-up vegetables with a low-fat dip as an appetizer instead of chips and dip.  Sprinkle sunflower seeds or chopped almonds over salads. Or try adding chopped walnuts or almonds to cooked vegetables.  Try some vegetarian meals using beans and peas. Add garbanzo or kidney beans to salads. Make burritos and tacos with mashed edwards beans or black beans.  Where can you learn more?  Go to  "https://www.[employer].net/patiented  Enter H967 in the search box to learn more about \"DASH Diet: Care Instructions.\"  Current as of: March 1, 2023               Content Version: 13.7 2006-2023 [employer].   Care instructions adapted under license by your healthcare professional. If you have questions about a medical condition or this instruction, always ask your healthcare professional. [employer] disclaims any warranty or liability for your use of this information.         Lowering Your Blood Pressure with DASH  What is the DASH eating plan?  DASH (Dietary Approaches to Stop Hypertension) can help you prevent or lower high blood pressure. This eating plan provides the nutrients that help lower blood pressure: potassium, magnesium, calcium, protein and fiber.   If not controlled, high blood pressure may lead to heart disease, stroke or blindness.  This eating plan is rich in:   Fruits and vegetables  Whole grains  Fat-free or low-fat milk products  Fish and poultry (chicken, turkey, etc.)  Beans, seeds and nuts.  This eating plan is low in:   Salt and sodium  Sugar, sweets and drinks with sugar  Red meat, saturated fat and trans fat.  Lifestyle changes  Besides a healthy eating plan, other steps are needed to help control high blood pressure.   Stay at a healthy weight.  Be active for at least 30 minutes on most days.  If you drink alcohol, have no more than two drinks per day (for men) or one per day (for women).  If you take blood pressure medicine, take it as directed.  Losing weight with DASH  You can lose weight by eating fewer calories. It is best to take off pounds slowly over time. Talk to a dietitian about the best way to do this.  Staying active   To shed pounds, combine DASH with daily exercise. Start with a 15-minute walk each day. Slowly increase the time until you reach a total of 30 minutes on most days. To avoid weight gain, try for 60 minutes each day. Check with " your doctor before starting any exercise program.  Tips for less sodium  Avoid processed foods. These may include baked goods, cereals, soy sauce and even antacids.  Cook with less salt. Don't bring the saltshaker to the table.  Season food with herbs, spices, lemon, lime, vinegar, wine and salt-free seasoning blends.  Use low-sodium canned vegetables or fruits.  Tips to ease the change  Take a week or two to slowly make changes to your diet.  Add a serving of vegetables at lunch one day. The next day, add a serving at dinner as well.  Add fruit to one meal or eat it as a snack.  Work up to three servings of fat-free and low-fat milk products each day.  If you eat large portions of meat, cut back by a third at each meal. The goal is to eat 6 oz (ounces) of meat or less per day.  Serve brown rice and whole-wheat bread and pasta.  Try casseroles and stir-garza dishes that have less meat and more vegetables, grains or cooked dry beans.  Serve two or more meatless meals each week.  For snacks and desserts, eat foods low in fat, sodium and sugar, such as:  Unsalted rice cakes, nuts or seeds  Fresh fruits, raw vegetables and raisins  Fat-free, low-fat or frozen yogurt  Popcorn with no salt or butter.  If you have trouble digesting milk products, try lactose-free milk or take lactase pills.  If you have a nut allergy, eat seeds, beans, lentils or split peas.  Fruits, vegetables and whole grain foods are high in fiber. To avoid bloating and diarrhea (loose, watery stools), increase these foods over several weeks.  The DASH eating plan  Use this chart to plan your meals. Or take it with you when you shop for food.           Food Group Servings per Day  Serving Size Examples    1,600 Calories 2,000 Calories 2,600 Calories         Grains  (whole wheat) 6 6 to 8 10 to 11 1 slice bread  1 oz dry cereal    cup cooked rice, pasta or cereal Whole-wheat bread and rolls, whole-wheat pasta, English muffin, josey bread, bagel, cereals,  grits, oatmeal, brown rice, unsalted pretzels and popcorn   Vegetables  3 to 4 4 to 5 5 to 6 1 cup raw leafy vegetables    cup cut-up raw or cooked vegetable    cup vegetable juice Broccoli, carrots, collards, green beans, green peas, kale, lima beans, potatoes, spinach, squash, sweet potatoes, tomatoes   Fruits 4 4 to 5 5 to 6 1 medium fruit    cup dried fruit    cup fresh, frozen, or canned fruit    cup fruit juice Apples, apricots, bananas, dates, grapes, oranges, grapefruit, mangoes, melons, peaches, pineapples, raisins, strawberries, tangerines   Fat-free or   low-fat milk and milk products 2 to 3 2 to 3 3 1 cup milk or yogurt  1   oz cheese Fat-free or low-fat (1%) milk, buttermilk, cheese, regular or frozen yogurt   Lean meats, poultry and fish 3 to 6 6 or less 6 1 oz cooked meats, poultry (chicken, turkey) or fish  1 egg  2 egg whites Lean meats (trim away any fat, then broil, roast or poach); remove skin from poultry. Eggs are high in cholesterol, so limit egg yolks to four or less per week.   Nuts, seeds   and legumes 3 per week 4 to 5 per week 1 per day ? cup (1   oz) nuts  2 Tbsp peanut butter  2 Tbsp (   oz) seeds    cup cooked legumes (dry beans and peas) Almonds, hazelnuts, mixed nuts, peanuts, walnuts, sunflower seeds, peanut butter, kidney beans, lentils, split peas   Fats and oils 2 2 to 3 3 1 tsp soft margarine  1 tsp vegetable oil  1 Tbsp kee  2 Tbsp salad dressing Soft margarine, vegetable oil (such as canola, corn, olive or safflower), low-fat mayonnaise, light salad dressing   Sweets and   added sugars 0 5 or less per week 2 or less per day 1 Tbsp sugar, jelly or jam    cup sorbet or gelatin  1 cup lemonade Fruit-flavored gelatin, fruit punch, hard candy, jelly, maple syrup, sorbets and ices   A sample meal plan  This sample menu gives two sodium levels. Start with 2,300 mg of sodium (about 1 teaspoon of table salt per day). Then, try to lower your intake to 1,500 mg a day. Talk to your  "doctor or dietitian about how to do this.   These samples are for people who eat 2,000 calories per day. The less salt you eat, the more you may lower your blood pressure.   Menu for 2,300 mg sodium per day   Breakfast:   cup instant oatmeal, 1 mini whole-wheat bagel, 1 tablespoon peanut butter, 1 medium banana, 1 cup (8 ounces) low-fat milk.   Lunch: 1 cup cantaloupe chunks, 1 cup apple juice and one chicken breast sandwich that includes:  Two slices (3 ounces) chicken breast, no skin  Two slices whole-wheat bread  1 slice (   ounce) reduced-fat cheddar cheese  One leaf luis lettuce  Two slices tomato  1 tablespoon low-fat mayonnaise.  Dinner: 1 cup cooked spaghetti,   cup low-salt vegetarian spaghetti sauce, 3 tablespoons Parmesan cheese;   cup corn (from frozen);   cup canned pears in juice; one spinach salad that includes:  1 cup fresh spinach leaves    cup fresh carrots, grated    cup fresh mushrooms, sliced  1 tablespoon vinegar and oil dressing.  Snacks:? cup unsalted almonds;   cup dried apricots; 1 cup fat-free fruit yogurt, no sugar added.  Reducing to 1,500 mg sodium per day  Use the same menu plan, but:  For breakfast, replace instant oatmeal with regular oatmeal and 1 teaspoon cinnamon.  For lunch, replace cheddar cheese with low-sodium Swiss cheese.  Resources on diet and health  National Heart, [employer] [facility] [place]  [facility] Information [facility]  [address] [address]   [place] [address]   Phone: [phone]   TTY: [phone]   www.nhlbi.nih.gov   \"Aim for a Healthy Weight\"   www.nhlbi.nih.gov/health/public/heart/obesity/lose_wt/index.htm  \"Dietary Guidelines for Americans 2005\"   and \"A Healthier You\"   www.healthierus.gov/dietaryguidelines  For informational purposes only. Not to replace the advice of your health care provider. Adapted from \"Your Guide to Lowering Blood Pressure with DASH,\" by the National Heart, Lung and Blood [place],   NIH Publication No. , revised April 2006. " Available at www.nhlbi.nih.gov/health/public/heart/hbp/dash/index.htm. Published by [employer]. [employer] [id number] - REV 09/15.  For informational purposes only. Not to replace the advice of your health care provider.  Copyright   2018 [employer]. All rights reserved.

## 2024-05-30 ENCOUNTER — PATIENT OUTREACH (OUTPATIENT)
Dept: GASTROENTEROLOGY | Facility: CLINIC | Age: 59
End: 2024-05-30
Payer: COMMERCIAL

## 2024-05-30 LAB
HCV AB SERPL QL IA: NONREACTIVE
HIV 1+2 AB+HIV1 P24 AG SERPL QL IA: NONREACTIVE
VIT D+METAB SERPL-MCNC: 26 NG/ML (ref 20–50)

## 2024-06-05 DIAGNOSIS — Z12.11 ENCOUNTER FOR SCREENING COLONOSCOPY: Primary | ICD-10-CM

## 2024-06-05 RX ORDER — POLYETHYLENE GLYCOL 3350 17 G/17G
POWDER, FOR SOLUTION ORAL
Qty: 510 G | Refills: 0 | Status: ON HOLD | OUTPATIENT
Start: 2024-08-05 | End: 2024-08-15

## 2024-06-05 RX ORDER — BISACODYL 5 MG/1
TABLET, DELAYED RELEASE ORAL
Qty: 2 TABLET | Refills: 0 | Status: ON HOLD | OUTPATIENT
Start: 2024-08-05 | End: 2024-08-15

## 2024-06-05 NOTE — TELEPHONE ENCOUNTER
Screening Questions for the Scheduling of Screening Colonoscopies   (If Colonoscopy is diagnostic, Provider should review the chart before scheduling.)  Are you younger than 45 or older than 80?  NO  Do you take aspirin or fish oil?  NO (if yes, tell patient to stop 1 week prior to Colonoscopy)  Do you take warfarin (Coumadin), clopidogrel (Plavix), apixaban (Eliquis), dabigatram (Pradaxa), rivaroxaban (Xarelto) or any blood thinner? NO  Do you take Ozempic? NO  Do you use oxygen or a CPAP at home?  CPAP  Do you have kidney disease? NO  Are you on dialysis? NO  Have you had a stroke or heart attack in the last year? NO  Have you had a stent in your heart or any blood vessel in the last year? NO  Have you had a transplant of any organ? NO  Have you had a colonoscopy or upper endoscopy (EGD) before? YES         When?  2013  Date of scheduled Colonoscopy. 08/12/2024  Provider AIDE  Pharmacy [facility] - REQUESTING [facility] PREP

## 2024-06-12 ENCOUNTER — MYC MEDICAL ADVICE (OUTPATIENT)
Dept: FAMILY MEDICINE | Facility: OTHER | Age: 59
End: 2024-06-12
Payer: COMMERCIAL

## 2024-06-12 NOTE — TELEPHONE ENCOUNTER
Ewa,   I agree with your comments for response. If the pain happens again or persists, then she should be seen. If the pain does not come back and she is feeling well, she can watch and wait if she is comfortable with that.     Alissa Cooper NP on 6/12/2024 at 1:59 PM

## 2024-06-12 NOTE — TELEPHONE ENCOUNTER
5/29/24  LOV with Janet Naranjo for physical.     My thoughts:  If this pain continues to happen, we would encourage you to make an appointment to discuss this in clinic or head to [facility] or ER, depending on the severity of the pain and how long it lasts.      (Can give triage number).    (Patient scheduled for Colonoscopy in August).      Ewa Dan RN on 6/12/2024 at 11:44 AM

## 2024-08-05 RX ORDER — MULTIVITAMIN,THERAPEUTIC
1 TABLET ORAL DAILY
COMMUNITY

## 2024-08-15 ENCOUNTER — ANESTHESIA EVENT (OUTPATIENT)
Dept: SURGERY | Facility: OTHER | Age: 59
End: 2024-08-15
Payer: COMMERCIAL

## 2024-08-15 ENCOUNTER — HOSPITAL ENCOUNTER (OUTPATIENT)
Facility: OTHER | Age: 59
Discharge: HOME | End: 2024-08-15
Attending: SURGERY | Admitting: SURGERY
Payer: COMMERCIAL

## 2024-08-15 ENCOUNTER — ANESTHESIA (OUTPATIENT)
Dept: SURGERY | Facility: OTHER | Age: 59
End: 2024-08-15
Payer: COMMERCIAL

## 2024-08-15 VITALS
HEART RATE: 61 BPM | OXYGEN SATURATION: 96 % | SYSTOLIC BLOOD PRESSURE: 118 MMHG | RESPIRATION RATE: 16 BRPM | TEMPERATURE: 96.7 F | WEIGHT: 254.4 LBS | DIASTOLIC BLOOD PRESSURE: 77 MMHG | BODY MASS INDEX: 46.53 KG/M2

## 2024-08-15 PROCEDURE — 258N000003 HC RX IP 258 OP 636: Performed by: SURGERY

## 2024-08-15 PROCEDURE — 45378 DIAGNOSTIC COLONOSCOPY: CPT | Performed by: NURSE ANESTHETIST, CERTIFIED REGISTERED

## 2024-08-15 PROCEDURE — 250N000009 HC RX 250: Performed by: NURSE ANESTHETIST, CERTIFIED REGISTERED

## 2024-08-15 PROCEDURE — 45378 DIAGNOSTIC COLONOSCOPY: CPT | Performed by: SURGERY

## 2024-08-15 PROCEDURE — 250N000011 HC RX IP 250 OP 636: Performed by: NURSE ANESTHETIST, CERTIFIED REGISTERED

## 2024-08-15 PROCEDURE — 999N000010 HC STATISTIC ANES STAT CODE-CRNA PER MINUTE: Performed by: SURGERY

## 2024-08-15 PROCEDURE — G0105 COLORECTAL SCRN; HI RISK IND: HCPCS | Performed by: SURGERY

## 2024-08-15 RX ORDER — LIDOCAINE 40 MG/G
CREAM TOPICAL
Status: DISCONTINUED | OUTPATIENT
Start: 2024-08-15 | End: 2024-08-15 | Stop reason: HOSPADM

## 2024-08-15 RX ORDER — SODIUM CHLORIDE, SODIUM LACTATE, POTASSIUM CHLORIDE, CALCIUM CHLORIDE 600; 310; 30; 20 MG/100ML; MG/100ML; MG/100ML; MG/100ML
INJECTION, SOLUTION INTRAVENOUS CONTINUOUS
Status: DISCONTINUED | OUTPATIENT
Start: 2024-08-15 | End: 2024-08-15 | Stop reason: HOSPADM

## 2024-08-15 RX ORDER — NALOXONE HYDROCHLORIDE 0.4 MG/ML
0.2 INJECTION, SOLUTION INTRAMUSCULAR; INTRAVENOUS; SUBCUTANEOUS
Status: DISCONTINUED | OUTPATIENT
Start: 2024-08-15 | End: 2024-08-15 | Stop reason: HOSPADM

## 2024-08-15 RX ORDER — LIDOCAINE HYDROCHLORIDE 20 MG/ML
INJECTION, SOLUTION INFILTRATION; PERINEURAL PRN
Status: DISCONTINUED | OUTPATIENT
Start: 2024-08-15 | End: 2024-08-15

## 2024-08-15 RX ORDER — PROPOFOL 10 MG/ML
INJECTION, EMULSION INTRAVENOUS PRN
Status: DISCONTINUED | OUTPATIENT
Start: 2024-08-15 | End: 2024-08-15

## 2024-08-15 RX ORDER — PROPOFOL 10 MG/ML
INJECTION, EMULSION INTRAVENOUS CONTINUOUS PRN
Status: DISCONTINUED | OUTPATIENT
Start: 2024-08-15 | End: 2024-08-15

## 2024-08-15 RX ORDER — NALOXONE HYDROCHLORIDE 0.4 MG/ML
0.4 INJECTION, SOLUTION INTRAMUSCULAR; INTRAVENOUS; SUBCUTANEOUS
Status: DISCONTINUED | OUTPATIENT
Start: 2024-08-15 | End: 2024-08-15 | Stop reason: HOSPADM

## 2024-08-15 RX ORDER — ONDANSETRON 4 MG/1
4 TABLET, ORALLY DISINTEGRATING ORAL EVERY 6 HOURS PRN
Status: DISCONTINUED | OUTPATIENT
Start: 2024-08-15 | End: 2024-08-15 | Stop reason: HOSPADM

## 2024-08-15 RX ORDER — FLUMAZENIL 0.1 MG/ML
0.2 INJECTION, SOLUTION INTRAVENOUS
Status: DISCONTINUED | OUTPATIENT
Start: 2024-08-15 | End: 2024-08-15 | Stop reason: HOSPADM

## 2024-08-15 RX ORDER — ONDANSETRON 2 MG/ML
4 INJECTION INTRAMUSCULAR; INTRAVENOUS EVERY 6 HOURS PRN
Status: DISCONTINUED | OUTPATIENT
Start: 2024-08-15 | End: 2024-08-15 | Stop reason: HOSPADM

## 2024-08-15 RX ADMIN — PROPOFOL 160 MCG/KG/MIN: 10 INJECTION, EMULSION INTRAVENOUS at 08:48

## 2024-08-15 RX ADMIN — PROPOFOL 100 MG: 10 INJECTION, EMULSION INTRAVENOUS at 08:48

## 2024-08-15 RX ADMIN — SODIUM CHLORIDE, POTASSIUM CHLORIDE, SODIUM LACTATE AND CALCIUM CHLORIDE: 600; 310; 30; 20 INJECTION, SOLUTION INTRAVENOUS at 08:07

## 2024-08-15 RX ADMIN — LIDOCAINE HYDROCHLORIDE 40 MG: 20 INJECTION, SOLUTION INFILTRATION; PERINEURAL at 08:48

## 2024-08-15 ASSESSMENT — ACTIVITIES OF DAILY LIVING (ADL)
ADLS_ACUITY_SCORE: 34

## 2024-08-15 NOTE — H&P
PRE-PROCEDURE NOTE    CHIEFCOMPLAINT / REASON FOR PROCEDURE:  Screening for polyps and colorectal cancer.    PERTINENT HISTORY   Patient is due for colonoscopy. Previous colonoscopy . No family history of colon polyps or colon cancer.    Past Medical History:   Diagnosis Date    Anemia 2013    Benign neoplasm of colon     2014,2013-repeat colonoscopy in 2018    Diverticulosis of large intestine without perforation or abscess without bleeding     3/29/2013    Gastritis without bleeding     3/29/2013    Gastro-esophageal reflux disease without esophagitis     No Comments Provided    History of colonic polyps     3/29/2013    Insomnia     No Comments Provided    Old tear of medial meniscus of right knee 2017    Postmenopausal bleeding 2021    Added automatically from request for surgery [id number]    Urge incontinence     2011       Past Surgical History:   Procedure Laterality Date    ARTHROPLASTY KNEE Right 2021    Procedure: ARTHROPLASTY, KNEE, TOTAL;  Surgeon: Mario Muhammad MD;  Location: [facility] OR    ARTHROPLASTY KNEE Left 2021    Procedure: ARTHROPLASTY, KNEE, TOTAL;  Surgeon: Mario Muhammad MD;  Location:  OR    ARTHROSCOPY KNEE BILATERAL      carpel tunnel surgery - bilateral       SECTION      x 2    COLONOSCOPY  2013    3/29/13,Colonoscopy F/U     DILATION AND CURETTAGE, OPERATIVE HYSTEROSCOPY, COMBINED N/A 2021    Procedure: HYSTEROSCOPY, WITH DILATION AND CURETTAGE OF UTERUS;  Surgeon: Caridad Amaya MD;  Location:  OR    ESOPHAGOSCOPY, GASTROSCOPY, DUODENOSCOPY (EGD), COMBINED      3/29 13,EGD    LAPAROSCOPIC TUBAL LIGATION      No Comments Provided         Other:  None  Bleeding tendencies: No     Relevant Family History:  None     Relevant Social History:  None     10 point ROS of systems including Constitutional, Eyes, Respiratory, Cardiovascular, Gastroenterology, Genitourinary, Integumentary, Muscularskeletal, Psychiatric were  all negative except for pertinent positives noted in my HPI.      ALLERGIES/SENSITIVITIES:   Allergies   Allergen Reactions    Penicillins Rash    Sulfa Antibiotics Rash        CURRENT MEDICATIONS:    Current Facility-Administered Medications   Medication Dose Route Frequency Provider Last Rate Last Admin    lactated ringers infusion   Intravenous Continuous Johnie Hemphill MD 30 mL/hr at 08/15/24 0807 New Bag at 08/15/24 0807    lidocaine (LMX4) cream   Topical Q1H PRN Johnie Hemphill MD        lidocaine 1 % 0.1-1 mL  0.1-1 mL Other Q1H PRN Johnie Hemphill MD        sodium chloride (PF) 0.9% PF flush 3 mL  3 mL Intracatheter Q8H Johnie Hemphill MD        sodium chloride (PF) 0.9% PF flush 3 mL  3 mL Intracatheter q1 min prn Johnie Hemphill MD               PRE-SEDATION ASSESSMENT:    LUNGS:  CTA B/L, no wheezing or crackles.  Heart & CV:  RRR no murmur.  Intact distal pulses, good cap refill.    Comment(s):      IMPRESSION: 58 year old female in need of screening colonoscopy.    PLAN:  I discussed screening colonoscopy with the patient. Anesthesia coverage requested.    Johnie Hemphill MD    8/15/2024 8:34 AM

## 2024-08-15 NOTE — DISCHARGE INSTRUCTIONS
[facility]  Adult Discharge Orders & Instructions      For 24 hours after surgery:  Get plenty of rest.  A responsible adult must stay with you for at least 24 hours after you leave the hospital.   You may feel lightheaded.  IF so, sit for a few minutes before standing.  Have someone help you get up.   You may have a slight fever. Call the doctor if your fever is over 101 F (38.3 C) (taken under the tongue) or lasts longer than 24 hours.  You may have a dry mouth, a sore throat, muscle aches or trouble sleeping.  These should go away after 24 hours.  Do not make important or legal decisions.  6.   Do not drive or use heavy equipment.  If you have weakness or tingling, don't drive or use heavy equipment until this feeling goes away.  To contact a doctor, call    [phone]______________

## 2024-08-15 NOTE — ANESTHESIA POSTPROCEDURE EVALUATION
Patient: Michelle Du    Procedure: Procedure(s):  Colonoscopy       Anesthesia Type:  MAC    Note:  Disposition: Outpatient   Postop Pain Control: Uneventful            Sign Out: Well controlled pain   PONV: No   Neuro/Psych: Uneventful            Sign Out: Acceptable/Baseline neuro status   Airway/Respiratory: Uneventful            Sign Out: Acceptable/Baseline resp. status   CV/Hemodynamics: Uneventful            Sign Out: Acceptable CV status; No obvious hypovolemia; No obvious fluid overload   Other NRE: NONE   DID A NON-ROUTINE EVENT OCCUR? No       Last vitals:  Vitals Value Taken Time   /81 08/15/24 1000   Temp 96.7  F (35.9  C) 08/15/24 0925   Pulse 60 08/15/24 1000   Resp 16 08/15/24 0945   SpO2 99 % 08/15/24 1004   Vitals shown include unfiled device data.    Electronically Signed By: ROBERTA Cevallos CRNA  August 15, 2024  12:02 PM

## 2024-08-15 NOTE — OP NOTE
PROCEDURE NOTE    SURGEON:Johnie Hemphill MD    PRE-OP DIAGNOSIS:  Screening Colonoscopy      POST-OP DIAGNOSIS: Normal with diverticulosis     PROCEDURE:  Colonoscopy    SPECIMEN:      * No specimens in log *    ANESTHESIA:  MAC CRNA Independent: Louis Oquendo APRN CRNA   Coverage requested due to BMI over 35    ESTIMATED BLOOD LOSS: none    COMPLICATIONS:  None    INDICATION FOR THE PROCEDURE: The patient is a 58 year old female. The patient presents with hx of polyps. I explained to the patient the risks, benefits and alternatives to screening colonoscopy for evaluating for cancer or polyps. We discussed the risks including bleeding, perforation, potential inability to reach the cecum and the risks of sedation. The patient's questions were answered and the patient wished to proceed. Informed consent paperwork was completed.    PROCEDURE: The patient was taken to the endoscopy suite. Appropriate monitors were attached. The patient was placed in the left lateral decubitus position. Timeout was performed confirming the patient's identity and procedure to be performed.  After appropriate sedation was confirmed, digital rectal exam was performed.  There was normal tone and no gross abnormality was noted.  The lubricated colonoscope was introduced into the anus the colon was insufflated with air. The prep quality was adequate. Under direct visualization the scope was advanced to the cecum. The mucosa of the colon was inspected while withdrawing the scope.  Diverticula were noted. The scope was retroflexed in the rectum and the anorectal junction was inspected. No abnormalities were noted. The scope was returned to a neutral position and the colon was decompressed. The scope was removed. The patient tolerated the procedure with no immediately apparent complication. The patient was taken to recovery in stable condition.    FOLLOW UP: RECOMMEND high fiber diet, 10 yr follow up.     Johnie Hemphill MD on 8/15/2024 at  9:20 AM

## 2024-08-15 NOTE — OR NURSING
Michelle has been discharged to home at 1015 via ambulatory accompanied by Marli Field RN    Written discharge instructions were provided to patient.  Prescriptions were N/A.  Patient states their pain is zero, and denies any nausea or dizziness upon discharge.    Patient and adult caring for them verbalize understanding of discharge instructions including no driving until tomorrow and no longer taking narcotic pain medications - no operating mechanical equipment and no making any important decisions.They understand reason for discharge, and necessary follow-up appointments.      Jeanne Rivera RN on 8/15/2024 at 10:22 AM

## 2024-08-15 NOTE — ANESTHESIA CARE TRANSFER NOTE
Patient: Michelle Du    Procedure: Procedure(s):  Colonoscopy       Diagnosis: Screen for colon cancer [Z12.11]  Diagnosis Additional Information: No value filed.    Anesthesia Type:   MAC     Note:    Oropharynx: oropharynx clear of all foreign objects  Level of Consciousness: awake  Oxygen Supplementation: nasal cannula  Level of Supplemental Oxygen (L/min / FiO2): 3  Independent Airway: airway patency satisfactory and stable  Dentition: dentition unchanged  Vital Signs Stable: post-procedure vital signs reviewed and stable  Report to RN Given: handoff report given  Patient transferred to: Phase II    Handoff Report: Identifed the Patient, Identified the Reponsible Provider, Reviewed the pertinent medical history, Discussed the surgical course, Reviewed Intra-OP anesthesia mangement and issues during anesthesia, Set expectations for post-procedure period and Allowed opportunity for questions and acknowledgement of understanding  Vitals:  Vitals Value Taken Time   BP     Temp     Pulse     Resp     SpO2         Electronically Signed By: ROBERTA Cevallos CRNA  August 15, 2024  9:20 AM

## 2024-08-15 NOTE — ANESTHESIA PREPROCEDURE EVALUATION
Anesthesia Pre-Procedure Evaluation    Patient: Michelle Du   MRN: [id number] : 1965        Procedure : Procedure(s):  Colonoscopy          Past Medical History:   Diagnosis Date    Anemia 2013    Benign neoplasm of colon     2014,2013-repeat colonoscopy in 2018    Diverticulosis of large intestine without perforation or abscess without bleeding     3/29/2013    Gastritis without bleeding     3/29/2013    Gastro-esophageal reflux disease without esophagitis     No Comments Provided    History of colonic polyps     3/29/2013    Insomnia     No Comments Provided    Old tear of medial meniscus of right knee 2017    Postmenopausal bleeding 2021    Added automatically from request for surgery [id number]    Urge incontinence     2011      Past Surgical History:   Procedure Laterality Date    ARTHROPLASTY KNEE Right 2021    Procedure: ARTHROPLASTY, KNEE, TOTAL;  Surgeon: Mario Muhammad MD;  Location: [facility] OR    ARTHROPLASTY KNEE Left 2021    Procedure: ARTHROPLASTY, KNEE, TOTAL;  Surgeon: Mario Muhammad MD;  Location:  OR    ARTHROSCOPY KNEE BILATERAL      carpel tunnel surgery - bilateral       SECTION      x 2    COLONOSCOPY  2013    3/29/13,Colonoscopy F/U     DILATION AND CURETTAGE, OPERATIVE HYSTEROSCOPY, COMBINED N/A 2021    Procedure: HYSTEROSCOPY, WITH DILATION AND CURETTAGE OF UTERUS;  Surgeon: Caridad Amaya MD;  Location:  OR    ESOPHAGOSCOPY, GASTROSCOPY, DUODENOSCOPY (EGD), COMBINED      3/29 13,EGD    LAPAROSCOPIC TUBAL LIGATION      No Comments Provided      Allergies   Allergen Reactions    Penicillins Rash    Sulfa Antibiotics Rash      Social History     Tobacco Use    Smoking status: Never     Passive exposure: Never    Smokeless tobacco: Never   Substance Use Topics    Alcohol use: No     Alcohol/week: 0.0 standard drinks of alcohol      Wt Readings from Last 1 Encounters:   08/15/24 115.4 kg (254 lb 6.4 oz)     "    Anesthesia Evaluation   Pt has had prior anesthetic. Type: General, MAC and Regional.    No history of anesthetic complications       ROS/MED HX  ENT/Pulmonary:     (+) sleep apnea, uses CPAP,   SVETLANA risk factors, snores loudly,  obese,                                Neurologic:       Cardiovascular:     (+) Dyslipidemia - -   -  - -                                   (-) hypertension   METS/Exercise Tolerance: >4 METS    Hematologic:       Musculoskeletal:       GI/Hepatic:     (+) GERD,       bowel prep,            Renal/Genitourinary:       Endo:     (+)               Obesity,       Psychiatric/Substance Use:       Infectious Disease:       Malignancy:       Other:            Physical Exam    Airway        Mallampati: II   TM distance: > 3 FB   Neck ROM: full   Mouth opening: > 3 cm    Respiratory Devices and Support         Dental       (+) Minor Abnormalities - some fillings, tiny chips      Cardiovascular   cardiovascular exam normal          Pulmonary   pulmonary exam normal                OUTSIDE LABS:  CBC:   Lab Results   Component Value Date    WBC 5.5 05/29/2024    WBC 6.1 01/27/2021    HGB 14.0 05/29/2024    HGB 12.2 08/21/2021    HCT 43.6 05/29/2024    HCT 41.4 01/27/2021     05/29/2024     01/27/2021     BMP:   Lab Results   Component Value Date     05/29/2024     08/04/2021    POTASSIUM 5.0 05/29/2024    POTASSIUM 4.0 08/04/2021    CHLORIDE 105 05/29/2024    CHLORIDE 104 08/04/2021    CO2 28 05/29/2024    CO2 28 08/04/2021    BUN 9.2 05/29/2024    BUN 25 08/04/2021    CR 0.75 05/29/2024    CR 0.89 08/04/2021    GLC 92 05/29/2024    GLC 91 08/04/2021     COAGS:   Lab Results   Component Value Date    INR 1.2 03/25/2013     POC: No results found for: \"BGM\", \"HCG\", \"HCGS\"  HEPATIC:   Lab Results   Component Value Date    ALBUMIN 3.9 04/14/2017    PROTTOTAL 7.0 04/14/2017    ALKPHOS 77 04/14/2017    BILITOTAL 0.7 04/14/2017     OTHER:   Lab Results   Component Value Date    " ELAN 9.6 05/29/2024       Anesthesia Plan    ASA Status:  3    NPO Status:  NPO Appropriate    Anesthesia Type: MAC.     - Reason for MAC: straight local not clinically adequate              Consents    Anesthesia Plan(s) and associated risks, benefits, and realistic alternatives discussed. Questions answered and patient/representative(s) expressed understanding.     - Discussed: Risks, Benefits and Alternatives for BOTH SEDATION and the PROCEDURE were discussed     - Discussed with:  Patient      - Extended Intubation/Ventilatory Support Discussed: No.      - Patient is DNR/DNI Status: No     Use of blood products discussed: No .     Postoperative Care            Comments:               ROBERTA Liriano CRNA    I have reviewed the pertinent notes and labs in the chart from the past 30 days and (re)examined the patient.  Any updates or changes from those notes are reflected in this note.

## 2024-09-26 ENCOUNTER — HOSPITAL ENCOUNTER (OUTPATIENT)
Dept: MAMMOGRAPHY | Facility: OTHER | Age: 59
Discharge: HOME | End: 2024-09-26
Attending: PHYSICIAN ASSISTANT | Admitting: PHYSICIAN ASSISTANT
Payer: COMMERCIAL

## 2024-09-26 DIAGNOSIS — Z12.31 VISIT FOR SCREENING MAMMOGRAM: ICD-10-CM

## 2024-09-26 PROCEDURE — 77063 BREAST TOMOSYNTHESIS BI: CPT

## 2024-10-03 ENCOUNTER — HOSPITAL ENCOUNTER (OUTPATIENT)
Dept: ULTRASOUND IMAGING | Facility: OTHER | Age: 59
Discharge: HOME | End: 2024-10-03
Attending: PHYSICIAN ASSISTANT
Payer: COMMERCIAL

## 2024-10-03 ENCOUNTER — HOSPITAL ENCOUNTER (OUTPATIENT)
Dept: MAMMOGRAPHY | Facility: OTHER | Age: 59
Discharge: HOME | End: 2024-10-03
Attending: PHYSICIAN ASSISTANT
Payer: COMMERCIAL

## 2024-10-03 DIAGNOSIS — R92.8 ABNORMAL FINDING ON BREAST IMAGING: ICD-10-CM

## 2024-10-03 PROCEDURE — 77065 DX MAMMO INCL CAD UNI: CPT | Mod: LT

## 2024-10-03 PROCEDURE — 76642 ULTRASOUND BREAST LIMITED: CPT | Mod: LT

## 2025-04-29 ENCOUNTER — PATIENT OUTREACH (OUTPATIENT)
Dept: CARE COORDINATION | Facility: CLINIC | Age: 60
End: 2025-04-29
Payer: COMMERCIAL

## 2025-07-16 ENCOUNTER — OFFICE VISIT (OUTPATIENT)
Dept: FAMILY MEDICINE | Facility: OTHER | Age: 60
End: 2025-07-16
Attending: STUDENT IN AN ORGANIZED HEALTH CARE EDUCATION/TRAINING PROGRAM
Payer: COMMERCIAL

## 2025-07-16 ENCOUNTER — HOSPITAL ENCOUNTER (OUTPATIENT)
Dept: GENERAL RADIOLOGY | Facility: OTHER | Age: 60
Discharge: HOME | End: 2025-07-16
Attending: STUDENT IN AN ORGANIZED HEALTH CARE EDUCATION/TRAINING PROGRAM
Payer: COMMERCIAL

## 2025-07-16 VITALS
RESPIRATION RATE: 20 BRPM | WEIGHT: 269.25 LBS | DIASTOLIC BLOOD PRESSURE: 90 MMHG | SYSTOLIC BLOOD PRESSURE: 154 MMHG | HEART RATE: 76 BPM | TEMPERATURE: 98 F | OXYGEN SATURATION: 95 % | BODY MASS INDEX: 49.55 KG/M2 | HEIGHT: 62 IN

## 2025-07-16 DIAGNOSIS — M25.511 ACUTE PAIN OF RIGHT SHOULDER: Primary | ICD-10-CM

## 2025-07-16 DIAGNOSIS — M25.511 ACUTE PAIN OF RIGHT SHOULDER: ICD-10-CM

## 2025-07-16 PROCEDURE — 73030 X-RAY EXAM OF SHOULDER: CPT | Mod: RT

## 2025-07-16 PROCEDURE — 73030 X-RAY EXAM OF SHOULDER: CPT | Mod: 26 | Performed by: RADIOLOGY

## 2025-07-16 RX ORDER — HYDROCODONE BITARTRATE AND ACETAMINOPHEN 5; 325 MG/1; MG/1
1 TABLET ORAL EVERY 6 HOURS PRN
Qty: 10 TABLET | Refills: 0 | Status: SHIPPED | OUTPATIENT
Start: 2025-07-16 | End: 2025-07-19

## 2025-07-16 ASSESSMENT — PAIN SCALES - GENERAL: PAINLEVEL_OUTOF10: SEVERE PAIN (7)

## 2025-07-16 NOTE — PROGRESS NOTES
"  Assessment & Plan   Problem List Items Addressed This Visit    None  Visit Diagnoses         Acute pain of right shoulder    -  Primary    Relevant Medications    HYDROcodone-acetaminophen (NORCO) 5-325 MG tablet    Other Relevant Orders    XR Shoulder Right 2 Views (Completed)    MR Shoulder Right w/o Contrast           Patient is quite tender on exam.   BP is elevated  No known cause but suspect rotator cuff pathology, possible bicep strain.   Continue topicals, advil, tylenol and give a few tabs of prn norco for severe pain. Ordered MRI to further assess, she will consider PT after mri     BMI  Estimated body mass index is 49.25 kg/m  as calculated from the following:    Height as of this encounter: 1.575 m (5' 2\").    Weight as of this encounter: 122.1 kg (269 lb 4 oz).           Orquidea Martinez is a 59 year old, presenting for the following health issues:  Arm Pain (Right bicep pain x 3 weeks, pain rated 7 radiating into shoulder and down into elbow)    Arm Pain    History of Present Illness       Reason for visit:  Arm pain   She is taking medications regularly.      Right arm  - started about 3 weeks ago   - no known trauma  - is right handed  - noticed it worsening towards the end of her time off at home   - taking advil duo (with tylenol), topical patches, topical bio freeze etc with out relief  - hard to sleep due to the pain                 Review of Systems  Constitutional, HEENT, cardiovascular, pulmonary, gi and gu systems are negative, except as otherwise noted.      Objective    BP (!) 154/90 (BP Location: Right arm, Patient Position: Sitting, Cuff Size: Adult Large)   Pulse 76   Temp 98  F (36.7  C) (Tympanic)   Resp 20   Ht 1.575 m (5' 2\")   Wt 122.1 kg (269 lb 4 oz)   LMP 05/25/2018 (Exact Date)   SpO2 95%   BMI 49.25 kg/m    Body mass index is 49.25 kg/m .  Physical Exam   GENERAL: alert and no distress  MS: right arm tenderness to palpation along short head of biceps. No lumps or " masses. Right shoulder tender to palpation at AC joint. Smith and empty can test positive on the right arm.  strength slightly decreased right hand compared to left     XR Shoulder Right 2 Views  Result Date: 7/16/2025  PROCEDURE: XR SHOULDER RIGHT 2 VIEWS 7/16/2025 3:04 PM HISTORY: right posterior and anterior shoulder pain; Acute pain of right shoulder COMPARISONS: None. TECHNIQUE: 3 views. FINDINGS: There is moderate hypertrophic change in the acromioclavicular joint. Acromiohumeral distance appears fairly normal. No fracture or dislocation is seen.        IMPRESSION: Degenerative change in the AC joint. JESSE KUMAR MD   SYSTEM ID:  [id number]        Signed Electronically by: Rj Sosa MD

## 2025-07-16 NOTE — NURSING NOTE
"Chief Complaint   Patient presents with    Arm Pain     Right bicep pain x 3 weeks, pain rated 7 radiating into shoulder and down into elbow       Initial BP (!) 154/90 (BP Location: Right arm, Patient Position: Sitting, Cuff Size: Adult Large)   Pulse 76   Temp 98  F (36.7  C) (Tympanic)   Resp 20   Ht 1.575 m (5' 2\")   Wt 122.1 kg (269 lb 4 oz)   LMP 05/25/2018 (Exact Date)   SpO2 95%   BMI 49.25 kg/m   Estimated body mass index is 49.25 kg/m  as calculated from the following:    Height as of this encounter: 1.575 m (5' 2\").    Weight as of this encounter: 122.1 kg (269 lb 4 oz).  Medication Review: complete    The next two questions are to help us understand your food security.  If you are feeling you need any assistance in this area, we have resources available to support you today.          5/28/2024   SDOH- Food Insecurity   Within the past 12 months, did you worry that your food would run out before you got money to buy more? N   Within the past 12 months, did the food you bought just not last and you didn t have money to get more? N        Data saved with a previous flowsheet row definition         Health Care Directive:  Patient has a Health Care Directive on file      Christina Ramirez LPN      "

## 2025-07-31 ENCOUNTER — HOSPITAL ENCOUNTER (OUTPATIENT)
Dept: MRI IMAGING | Facility: OTHER | Age: 60
End: 2025-07-31
Attending: STUDENT IN AN ORGANIZED HEALTH CARE EDUCATION/TRAINING PROGRAM
Payer: COMMERCIAL

## 2025-07-31 DIAGNOSIS — M25.511 ACUTE PAIN OF RIGHT SHOULDER: ICD-10-CM

## 2025-07-31 PROCEDURE — 73221 MRI JOINT UPR EXTREM W/O DYE: CPT | Mod: RT

## 2025-08-15 ENCOUNTER — RESULTS FOLLOW-UP (OUTPATIENT)
Dept: FAMILY MEDICINE | Facility: OTHER | Age: 60
End: 2025-08-15

## 2025-08-15 DIAGNOSIS — E78.2 MIXED HYPERLIPIDEMIA: Primary | ICD-10-CM

## 2025-08-19 ENCOUNTER — OFFICE VISIT (OUTPATIENT)
Dept: FAMILY MEDICINE | Facility: OTHER | Age: 60
End: 2025-08-19
Attending: STUDENT IN AN ORGANIZED HEALTH CARE EDUCATION/TRAINING PROGRAM
Payer: COMMERCIAL

## 2025-08-19 VITALS
SYSTOLIC BLOOD PRESSURE: 130 MMHG | DIASTOLIC BLOOD PRESSURE: 80 MMHG | HEART RATE: 82 BPM | BODY MASS INDEX: 48.47 KG/M2 | RESPIRATION RATE: 16 BRPM | WEIGHT: 265 LBS | TEMPERATURE: 97.3 F | OXYGEN SATURATION: 95 %

## 2025-08-19 DIAGNOSIS — M19.011 ARTHRITIS OF RIGHT ACROMIOCLAVICULAR JOINT: ICD-10-CM

## 2025-08-19 DIAGNOSIS — M12.811 ROTATOR CUFF TEAR ARTHROPATHY OF RIGHT SHOULDER: ICD-10-CM

## 2025-08-19 DIAGNOSIS — M75.41 SUBACROMIAL IMPINGEMENT OF RIGHT SHOULDER: Primary | ICD-10-CM

## 2025-08-19 DIAGNOSIS — M75.101 ROTATOR CUFF TEAR ARTHROPATHY OF RIGHT SHOULDER: ICD-10-CM

## 2025-08-19 ASSESSMENT — PAIN SCALES - GENERAL: PAINLEVEL_OUTOF10: MILD PAIN (3)

## 2025-08-28 ENCOUNTER — HOSPITAL ENCOUNTER (OUTPATIENT)
Dept: CT IMAGING | Facility: OTHER | Age: 60
End: 2025-08-28
Attending: PHYSICIAN ASSISTANT
Payer: COMMERCIAL

## 2025-08-28 DIAGNOSIS — Z82.49 FAMILY HISTORY OF ISCHEMIC HEART DISEASE: ICD-10-CM

## 2025-08-28 PROCEDURE — 75571 CT HRT W/O DYE W/CA TEST: CPT

## 2025-09-03 RX ORDER — ATORVASTATIN CALCIUM 10 MG/1
10 TABLET, FILM COATED ORAL DAILY
Qty: 90 TABLET | Refills: 1 | Status: SHIPPED | OUTPATIENT
Start: 2025-09-03

## (undated) DEVICE — DRAPE EXTREMITY W/ARMBOARD 29405

## (undated) DEVICE — DRAPE STERI U 1015

## (undated) DEVICE — SOL WATER 1500ML

## (undated) DEVICE — PACK MAJOR ORTHO SOP15MOFCA

## (undated) DEVICE — PEN MARKING SKIN W/LABELS 31145918

## (undated) DEVICE — ESU GROUND PAD ADULT W/CORD E7507

## (undated) DEVICE — SUCTION MANIFOLD NEPTUNE 2 SYS 4 PORT 0702-020-000

## (undated) DEVICE — GLOVE PROTEXIS POWDER FREE SMT 7.0  2D72PT70X

## (undated) DEVICE — BNDG ELASTIC 6" DBL LENGTH UNSTERILE 6611-16

## (undated) DEVICE — Device

## (undated) DEVICE — ESU SUCTION COAG CAUTERY HAND CONTROL 10FR 6" 30-5200

## (undated) DEVICE — SU VICRYL 2-0 CT-1 36" UND J945H

## (undated) DEVICE — GLOVE BIOGEL PI INDICATOR 8.0 LF 41680

## (undated) DEVICE — PAD POLAR UNIV KNEE/SHOULDER LF 02320

## (undated) DEVICE — DRAPE OVERHEAD TABLE

## (undated) DEVICE — STRAP STIRRUP W/O SLIP 30187-020

## (undated) DEVICE — ESU PENCIL SMOKE EVAC W/ROCKER SWITCH 0703-047-000

## (undated) DEVICE — DECANTER VIAL 2006S

## (undated) DEVICE — SYSTEM COLD THERAPY POLAR CARE GLACIER 10901

## (undated) DEVICE — DILATOR CERVICAL OS FINDER TAPER 2-4MMX21.5CM 1176

## (undated) DEVICE — DRSG AQUACEL AG EXTRA 4X4.7" 420677

## (undated) DEVICE — COVER LIGHT HANDLE LT-F02

## (undated) DEVICE — BLADE SAW RECIP STRK 70X12.5X0.80MM 0277-096-277

## (undated) DEVICE — PATELLA REAMING BLADE

## (undated) DEVICE — BONE CEMENT MIXEVAC III HI VAC KIT  0206-015-000

## (undated) DEVICE — PACK LITHOTOMY SBA15LIFCA

## (undated) DEVICE — SLEEVE COMPRESSION SCD KNEE MED 74022

## (undated) DEVICE — CAST PADDING 6" WEBRIL II UNSTERILE 4519

## (undated) DEVICE — ENDO KIT COMPLIANCE DYKENDOCMPLY

## (undated) DEVICE — PAD WRAP FOAM HOLDER POSITIONER KNEE DISP LF 2629-00

## (undated) DEVICE — 2.5MM FEMALE HEX SCREW

## (undated) DEVICE — STAPLER SKIN 35 WIDE PMW35 6/BX

## (undated) DEVICE — SU VICRYL 1 CTX CR 8X18" J765D

## (undated) DEVICE — GLOVE BIOGEL 7 LATEX

## (undated) DEVICE — ADH SKIN CLOSURE PREMIERPRO EXOFIN 1.0ML 3470

## (undated) DEVICE — TUBING IRRIG TUR Y TYPE 96" LF 6543-01

## (undated) DEVICE — GLOVE PROTEXIS PI ORTHO PF 7.5 2D73HT75

## (undated) DEVICE — TUBING SUCTION 10'X3/16" N510

## (undated) DEVICE — GLOVE PROTEXIS POWDER FREE SMT 6.5  2D72PT65X

## (undated) DEVICE — BLADE SAW OSCIL/SAG STRK 25X90X1.27MM 4125-127-090

## (undated) DEVICE — HOOD T4 PROTECTIVE STERI FACE SHIELD 400-800

## (undated) DEVICE — PACK BASIC SET UP STERILE 88178

## (undated) DEVICE — DRILL PIN HEADLESS TROCAR 00-5901-020-00

## (undated) DEVICE — DRSG TELFA 3X8" 1238

## (undated) DEVICE — DRSG ABDOMINAL PAD UNSTERILE 5X9" 9190

## (undated) DEVICE — ENDO BRUSH CHANNEL MASTER CLEANING 2-4.2MM BW-412T

## (undated) DEVICE — PANTIES MESH LG/XLG 2PK 706M2

## (undated) DEVICE — DRAPE TOWEL 17X27" BLUE LF DISP 28700-004

## (undated) DEVICE — PREP CHLORAPREP 26ML TINTED ORANGE  260815

## (undated) DEVICE — SUCTION TIP YANKAUER W/O VENT BULBOUS TIP

## (undated) DEVICE — PAD PERI INDIV WRAP 11" 2022A

## (undated) DEVICE — PAD CHUX UNDERPAD 30X36" P3036C

## (undated) RX ORDER — KETOROLAC TROMETHAMINE 30 MG/ML
INJECTION, SOLUTION INTRAMUSCULAR; INTRAVENOUS
Status: DISPENSED
Start: 2021-01-27

## (undated) RX ORDER — ONDANSETRON 2 MG/ML
INJECTION INTRAMUSCULAR; INTRAVENOUS
Status: DISPENSED
Start: 2021-08-20

## (undated) RX ORDER — CLINDAMYCIN PHOSPHATE 900 MG/50ML
INJECTION, SOLUTION INTRAVENOUS
Status: DISPENSED
Start: 2021-04-06

## (undated) RX ORDER — DEXAMETHASONE SODIUM PHOSPHATE 4 MG/ML
INJECTION, SOLUTION INTRA-ARTICULAR; INTRALESIONAL; INTRAMUSCULAR; INTRAVENOUS; SOFT TISSUE
Status: DISPENSED
Start: 2021-01-27

## (undated) RX ORDER — BUPIVACAINE HYDROCHLORIDE 2.5 MG/ML
INJECTION, SOLUTION EPIDURAL; INFILTRATION; INTRACAUDAL
Status: DISPENSED
Start: 2021-01-27

## (undated) RX ORDER — LIDOCAINE HYDROCHLORIDE 20 MG/ML
INJECTION, SOLUTION EPIDURAL; INFILTRATION; INTRACAUDAL; PERINEURAL
Status: DISPENSED
Start: 2021-08-20

## (undated) RX ORDER — TRANEXAMIC ACID 650 MG/1
TABLET ORAL
Status: DISPENSED
Start: 2021-04-06

## (undated) RX ORDER — PROPOFOL 10 MG/ML
INJECTION, EMULSION INTRAVENOUS
Status: DISPENSED
Start: 2021-04-06

## (undated) RX ORDER — KETOROLAC TROMETHAMINE 30 MG/ML
INJECTION, SOLUTION INTRAMUSCULAR; INTRAVENOUS
Status: DISPENSED
Start: 2021-04-06

## (undated) RX ORDER — FENTANYL CITRATE 50 UG/ML
INJECTION, SOLUTION INTRAMUSCULAR; INTRAVENOUS
Status: DISPENSED
Start: 2021-01-27

## (undated) RX ORDER — CLINDAMYCIN PHOSPHATE 900 MG/50ML
INJECTION, SOLUTION INTRAVENOUS
Status: DISPENSED
Start: 2021-08-20

## (undated) RX ORDER — ACETAMINOPHEN 325 MG/1
TABLET ORAL
Status: DISPENSED
Start: 2021-08-20

## (undated) RX ORDER — TRANEXAMIC ACID 650 MG/1
TABLET ORAL
Status: DISPENSED
Start: 2021-08-20

## (undated) RX ORDER — GLYCOPYRROLATE 0.2 MG/ML
INJECTION, SOLUTION INTRAMUSCULAR; INTRAVENOUS
Status: DISPENSED
Start: 2021-08-20

## (undated) RX ORDER — DEXAMETHASONE SODIUM PHOSPHATE 10 MG/ML
INJECTION, SOLUTION INTRAMUSCULAR; INTRAVENOUS
Status: DISPENSED
Start: 2021-08-20

## (undated) RX ORDER — PROPOFOL 10 MG/ML
INJECTION, EMULSION INTRAVENOUS
Status: DISPENSED
Start: 2021-08-20

## (undated) RX ORDER — ACETAMINOPHEN 325 MG/1
TABLET ORAL
Status: DISPENSED
Start: 2021-04-06

## (undated) RX ORDER — ONDANSETRON 2 MG/ML
INJECTION INTRAMUSCULAR; INTRAVENOUS
Status: DISPENSED
Start: 2021-01-27

## (undated) RX ORDER — PROPOFOL 10 MG/ML
INJECTION, EMULSION INTRAVENOUS
Status: DISPENSED
Start: 2021-01-27

## (undated) RX ORDER — LIDOCAINE HYDROCHLORIDE 20 MG/ML
INJECTION, SOLUTION EPIDURAL; INFILTRATION; INTRACAUDAL; PERINEURAL
Status: DISPENSED
Start: 2021-04-06

## (undated) RX ORDER — SODIUM CHLORIDE, SODIUM LACTATE, POTASSIUM CHLORIDE, CALCIUM CHLORIDE 600; 310; 30; 20 MG/100ML; MG/100ML; MG/100ML; MG/100ML
INJECTION, SOLUTION INTRAVENOUS
Status: DISPENSED
Start: 2021-04-06

## (undated) RX ORDER — BUPIVACAINE HYDROCHLORIDE 5 MG/ML
INJECTION, SOLUTION EPIDURAL; INTRACAUDAL
Status: DISPENSED
Start: 2021-08-20

## (undated) RX ORDER — SODIUM CHLORIDE, SODIUM LACTATE, POTASSIUM CHLORIDE, CALCIUM CHLORIDE 600; 310; 30; 20 MG/100ML; MG/100ML; MG/100ML; MG/100ML
INJECTION, SOLUTION INTRAVENOUS
Status: DISPENSED
Start: 2021-08-20

## (undated) RX ORDER — FENTANYL CITRATE-0.9 % NACL/PF 10 MCG/ML
PLASTIC BAG, INJECTION (ML) INTRAVENOUS
Status: DISPENSED
Start: 2021-08-20